# Patient Record
Sex: FEMALE | Race: WHITE | NOT HISPANIC OR LATINO | Employment: FULL TIME | URBAN - METROPOLITAN AREA
[De-identification: names, ages, dates, MRNs, and addresses within clinical notes are randomized per-mention and may not be internally consistent; named-entity substitution may affect disease eponyms.]

---

## 2021-08-18 PROBLEM — I10 HYPERTENSION: Status: ACTIVE | Noted: 2021-08-18

## 2021-09-23 PROBLEM — G90.A POSTURAL ORTHOSTATIC TACHYCARDIA SYNDROME: Status: ACTIVE | Noted: 2021-09-23

## 2021-10-01 ENCOUNTER — TELEPHONE (OUTPATIENT)
Dept: FAMILY MEDICINE CLINIC | Facility: CLINIC | Age: 21
End: 2021-10-01

## 2021-10-01 DIAGNOSIS — Z34.90 PREGNANCY, UNSPECIFIED GESTATIONAL AGE: Primary | ICD-10-CM

## 2021-10-01 NOTE — TELEPHONE ENCOUNTER
Hcg quantitative order placed. Pt is currently taking Metorpolol, Prozac and Fludrocortisone. She has not taken Trazodone, Amitiza or Hydroxyzine. Please advise.

## 2021-10-01 NOTE — TELEPHONE ENCOUNTER
Called pt advised to continue prozac and will discuss at follow up  further. Also advised to contact cardiology for management of metoprolol since was previously prescribed by cardiology and positive pregnancy test.  Pt verbalized understanding.

## 2021-10-01 NOTE — TELEPHONE ENCOUNTER
Pt is ok to continue prozac and we will discuss further at follow up. Metoprolol will need to be changed to lobetalol, I think cardiology prescribes metoprolol and fludrocortisone which is class C. So if pregnancy test is positive pt will need to consult with cardiology for management recommendations.

## 2021-10-01 NOTE — TELEPHONE ENCOUNTER
Caller: Ana Paula Vicente    Relationship to patient: Self    Best call back number: [phone]    Patient is needing: PATIENT CALLED STATING SHE TOOK 2 PREGNANCY TEST AND THEY BOTH CAME BACK POSITIVE. THE PATIENT IS WANTING TO KNOW IF HER PCP CAN ORDER SOME LABS TO GET BLOOD WORK DONE TO KNOW FOR SURE IF SHE IS PREGNANT. THE PATIENT WOULD LIKE A CALL BACK TO LET HER KNOW IF SHE CAN GET THOSE LABS ORDERED. PLEASE ADVISE THANK YOU.         HUB STAFF UNABLE TO WARM TRANSFER

## 2022-04-13 PROBLEM — I95.1 ORTHOSTATIC HYPOTENSION: Status: RESOLVED | Noted: 2021-08-18 | Resolved: 2022-04-13

## 2022-05-28 PROBLEM — O21.9 NAUSEA AND VOMITING IN PREGNANCY: Status: RESOLVED | Noted: 2021-11-11 | Resolved: 2022-05-28

## 2022-05-28 PROBLEM — Z34.90 ENCOUNTER FOR ELECTIVE INDUCTION OF LABOR: Status: ACTIVE | Noted: 2022-05-28

## 2023-07-24 NOTE — PROGRESS NOTES
Chief Complaint    Annual Exam  Annual Exam    Subjective          Ana Paula Amanda is a 22 y.o. female who presents to [facility]    History of Present Illness    Annual Exam    Last dental exam: 1 month ago.   Last eye exam: 1 year ago wears glasses.     Anxiety - pt reports it has been better but she is having some trouble with coping in the last month. Anniversary of her brothers death is upcoming. Pt feels like she lacks motivation. 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 Pt is sleeping well.     POTS - palpitations have been increased. Pt denies increase in caffeine intake. Reports good water intake. Pt has midsternal chest pain intermittently, non radiating. Pt denies n/v. Pt feels like she can't take a good deep breath in.     IBS - pt took linzess and amitiza and had severe diarrhea. She typically will have diarrhea 6 times per day or constipation for 5 days. Pt failed miralax and tried it for 2 weeks. Pt tried fiber without improvement. Last BM was yesterday. Diarrhea worse with anxiety. Magnesium did not help. Pt will have cramping with diarrhea. Pt has bloating.    PHQ-2 Total Score: 13   PHQ-9 Total Score: 13        Review of Systems   Constitutional:  Negative for fatigue and fever.   Respiratory:  Positive for chest tightness and shortness of breath.    Gastrointestinal:  Negative for constipation and diarrhea.   Genitourinary:  Negative for difficulty urinating.   Psychiatric/Behavioral:  Positive for dysphoric mood. The patient is nervous/anxious.         Medical History: has a past medical history of Anxiety, Depression, Lactose intolerance, Nausea and vomiting in pregnancy (11/11/2021), Orthostatic hypotension, POTS (postural orthostatic tachycardia syndrome), and Tachycardia.     Surgical History: has a past surgical history that includes Mandible surgery (2020).     Family History: family history includes Heart disease in her maternal grandfather, maternal  grandmother, mother, and paternal grandmother; Hypertension in her paternal grandmother; Lung cancer in her maternal grandmother; Melanoma in her maternal grandfather; Stroke in her paternal grandfather.     Social History: reports that she has never smoked. She has never been exposed to tobacco smoke. She has never used smokeless tobacco. She reports current alcohol use. She reports that she does not use drugs.    Allergies: Lactose intolerance (gi)      Health Maintenance Due   Topic Date Due    HPV VACCINES (1 - 2-dose series) Never done    ANNUAL PHYSICAL  Never done    COVID-19 Vaccine (4 - Moderna series) 03/03/2022    CHLAMYDIA SCREENING  11/11/2022    PAP SMEAR  07/22/2023            Current Outpatient Medications:     B Complex Vitamins (vitamin b complex) capsule capsule, Take  by mouth Daily., Disp: , Rfl:     Etonogestrel (NEXPLANON) 68 MG implant subdermal implant, Inject 1 each into the appropriate area of the skin as directed by provider 1 (One) Time., Disp: , Rfl:     FLUoxetine (PROzac) 40 MG capsule, Take 1 capsule by mouth Daily., Disp: 90 capsule, Rfl: 1    hydrOXYzine (ATARAX) 25 MG tablet, Take 1 tablet by mouth every night at bedtime. (Patient taking differently: Take 1 tablet by mouth As Needed.), Disp: 90 tablet, Rfl: 1    meclizine (ANTIVERT) 25 MG tablet, Take 1 tablet by mouth 3 (Three) Times a Day As Needed for Dizziness., Disp: 30 tablet, Rfl: 0    metoprolol succinate XL (TOPROL-XL) 25 MG 24 hr tablet, Take 1 tablet by mouth Daily. (Patient taking differently: Take 0.5 tablets by mouth Daily.), Disp: 90 tablet, Rfl: 1    ondansetron (Zofran) 4 MG tablet, Take 1 tablet by mouth Every 8 (Eight) Hours As Needed for Nausea or Vomiting., Disp: 10 tablet, Rfl: 0    Hyoscyamine Sulfate SL (Levsin/SL) 0.125 MG sublingual tablet, Place 1 tablet under the tongue 3 (Three) Times a Day As Needed (diarrhea)., Disp: 120 each, Rfl: 1    LORazepam (Ativan) 0.5 MG tablet, Take 1 tablet by  mouth Every 8 (Eight) Hours As Needed for Anxiety., Disp: 30 tablet, Rfl: 0      Immunization History   Administered Date(s) Administered    COVID-19 (MODERNA) 1st,2nd,3rd Dose Monovalent 12/24/2020, 01/19/2021, 01/06/2022    Hepatitis B Adult/Adolescent IM 07/26/2021    Tdap 07/22/2020, 03/29/2022         Objective       Vitals:    08/17/23 0702   BP: 109/73   Pulse: 88   Temp: 98.7 øF (37.1 øC)   TempSrc: Temporal   SpO2: 100%   Weight: 54.2 kg (119 lb 6.4 oz)      Body mass index is 19.27 kg/mý.   Wt Readings from Last 3 Encounters:   08/17/23 54.2 kg (119 lb 6.4 oz)   07/06/23 54.4 kg (120 lb)   02/19/23 53.5 kg (117 lb 15.1 oz)      BP Readings from Last 3 Encounters:   08/17/23 109/73   07/06/23 101/69   02/19/23 93/60        Physical Exam  Vitals reviewed.   Constitutional:       Appearance: Normal appearance. She is well-developed.   HENT:      Head: Normocephalic and atraumatic.   Eyes:      Conjunctiva/sclera: Conjunctivae normal.      Pupils: Pupils are equal, round, and reactive to light.   Cardiovascular:      Rate and Rhythm: Normal rate and regular rhythm.      Heart sounds: Normal heart sounds. No murmur heard.  Pulmonary:      Effort: Pulmonary effort is normal.      Breath sounds: Normal breath sounds. No wheezing or rhonchi.   Abdominal:      General: Bowel sounds are normal. There is no distension.      Palpations: Abdomen is soft.      Tenderness: There is no abdominal tenderness.   Skin:     General: Skin is warm and dry.   Neurological:      Mental Status: She is alert and oriented to person, place, and time.   Psychiatric:         Mood and Affect: Mood and affect normal.         Behavior: Behavior normal.         Thought Content: Thought content normal.         Judgment: Judgment normal.           Result Review :       Common labs          11/9/2022    14:07 2/19/2023    12:21   Common Labs   Glucose 76  86    BUN 13  17    Creatinine 0.79  0.84    Sodium 140  141    Potassium 4.6  3.6     Chloride 104  105    Calcium 10.0  9.5    Albumin 5.00  4.7    Total Bilirubin 0.4  0.5    Alkaline Phosphatase 87  81    AST (SGOT) 23  17    ALT (SGPT) 14  12    WBC 7.18  6.01    Hemoglobin 13.5  13.2    Hematocrit 40.3  37.6    Platelets 241  209                       Assessment and Plan        Diagnoses and all orders for this visit:    1. Annual physical exam (Primary)  -     CBC (No Diff); Future    2. Screening for lipid disorders  -     Lipid Panel; Future  -     Comprehensive Metabolic Panel; Future    3. Screening for thyroid disorder  -     TSH; Future    4. Anxiety and depression  -     FLUoxetine (PROzac) 40 MG capsule; Take 1 capsule by mouth Daily.  Dispense: 90 capsule; Refill: 1  -     LORazepam (Ativan) 0.5 MG tablet; Take 1 tablet by mouth Every 8 (Eight) Hours As Needed for Anxiety.  Dispense: 30 tablet; Refill: 0    5. Long-term use of high-risk medication  -     POC Urine Drug Screen [facility]-C[facility]    6. Palpitations  -     Adult Transthoracic Echo Complete W/ Cont if Necessary Per Protocol; Future    7. Irritable bowel syndrome with both constipation and diarrhea  Assessment & Plan:  Restart miralax titrate to soft daily stools.     Orders:  -     Hyoscyamine Sulfate SL (Levsin/SL) 0.125 MG sublingual tablet; Place 1 tablet under the tongue 3 (Three) Times a Day As Needed (diarrhea).  Dispense: 120 each; Refill: 1          Follow Up     Return in about 6 months (around 2/17/2024) for Next scheduled follow up.    Obtained a written consent for LIVAN query. Discussed the risks and benefits of the use of controlled substances with the patient, including the risk of tolerance and drug dependence.  The patient has been counseled on the need to have an exit strategy, including potentially discontinuing the use of controlled substances.  LIVAN has or will be reviewed as soon as it becomes available.    Updated annual wellness visit checklist.  Immunizations discussed.  Screening discussed  and/or ordered.  Recommend yearly dental and eye exams. Also discussed monitoring of blood pressure and lipids.      Patient was given instructions and counseling regarding her condition or for health maintenance advice. Please see specific information pulled into the AVS if appropriate.     PARIS Guerin

## 2023-08-03 ENCOUNTER — DOCUMENTATION (OUTPATIENT)
Dept: FAMILY MEDICINE CLINIC | Facility: CLINIC | Age: 23
End: 2023-08-03
Payer: COMMERCIAL

## 2023-08-03 RX ORDER — SULFAMETHOXAZOLE AND TRIMETHOPRIM 800; 160 MG/1; MG/1
1 TABLET ORAL 2 TIMES DAILY
Qty: 20 TABLET | Refills: 0 | Status: SHIPPED | OUTPATIENT
Start: 2023-08-03 | End: 2023-08-13

## 2023-08-04 DIAGNOSIS — L02.91 ABSCESS: Primary | ICD-10-CM

## 2023-08-04 RX ORDER — CLINDAMYCIN PHOSPHATE 10 MG/G
1 GEL TOPICAL 2 TIMES DAILY
Qty: 30 G | Refills: 0 | Status: SHIPPED | OUTPATIENT
Start: 2023-08-04

## 2023-08-17 ENCOUNTER — OFFICE VISIT (OUTPATIENT)
Dept: FAMILY MEDICINE CLINIC | Facility: CLINIC | Age: 23
End: 2023-08-17
Payer: COMMERCIAL

## 2023-08-17 VITALS
SYSTOLIC BLOOD PRESSURE: 109 MMHG | BODY MASS INDEX: 19.27 KG/M2 | WEIGHT: 119.4 LBS | HEART RATE: 88 BPM | DIASTOLIC BLOOD PRESSURE: 73 MMHG | TEMPERATURE: 98.7 F | OXYGEN SATURATION: 100 %

## 2023-08-17 DIAGNOSIS — F32.A ANXIETY AND DEPRESSION: ICD-10-CM

## 2023-08-17 DIAGNOSIS — Z00.00 ANNUAL PHYSICAL EXAM: Primary | ICD-10-CM

## 2023-08-17 DIAGNOSIS — Z79.899 LONG-TERM USE OF HIGH-RISK MEDICATION: ICD-10-CM

## 2023-08-17 DIAGNOSIS — F41.9 ANXIETY AND DEPRESSION: ICD-10-CM

## 2023-08-17 DIAGNOSIS — Z13.220 SCREENING FOR LIPID DISORDERS: ICD-10-CM

## 2023-08-17 DIAGNOSIS — R00.2 PALPITATIONS: ICD-10-CM

## 2023-08-17 DIAGNOSIS — K58.2 IRRITABLE BOWEL SYNDROME WITH BOTH CONSTIPATION AND DIARRHEA: ICD-10-CM

## 2023-08-17 DIAGNOSIS — Z13.29 SCREENING FOR THYROID DISORDER: ICD-10-CM

## 2023-08-17 PROCEDURE — 99395 PREV VISIT EST AGE 18-39: CPT | Performed by: NURSE PRACTITIONER

## 2023-08-17 PROCEDURE — 80305 DRUG TEST PRSMV DIR OPT OBS: CPT | Performed by: NURSE PRACTITIONER

## 2023-08-17 RX ORDER — FLUOXETINE HYDROCHLORIDE 40 MG/1
40 CAPSULE ORAL DAILY
Qty: 90 CAPSULE | Refills: 1 | Status: SHIPPED | OUTPATIENT
Start: 2023-08-17

## 2023-08-17 RX ORDER — HYOSCYAMINE SULFATE 0.12 MG/1
1 TABLET SUBLINGUAL 3 TIMES DAILY PRN
Qty: 120 EACH | Refills: 1 | Status: SHIPPED | OUTPATIENT
Start: 2023-08-17

## 2023-08-17 RX ORDER — LORAZEPAM 0.5 MG/1
0.5 TABLET ORAL EVERY 8 HOURS PRN
Qty: 30 TABLET | Refills: 0 | Status: SHIPPED | OUTPATIENT
Start: 2023-08-17

## 2023-08-24 ENCOUNTER — PROCEDURE VISIT (OUTPATIENT)
Dept: OBSTETRICS AND GYNECOLOGY | Facility: CLINIC | Age: 23
End: 2023-08-24
Payer: COMMERCIAL

## 2023-08-24 VITALS
HEART RATE: 73 BPM | SYSTOLIC BLOOD PRESSURE: 101 MMHG | BODY MASS INDEX: 19.13 KG/M2 | DIASTOLIC BLOOD PRESSURE: 67 MMHG | WEIGHT: 119 LBS | HEIGHT: 66 IN

## 2023-08-24 DIAGNOSIS — Z30.46 ENCOUNTER FOR NEXPLANON REMOVAL: ICD-10-CM

## 2023-08-24 DIAGNOSIS — Z30.09 ENCOUNTER FOR COUNSELING REGARDING CONTRACEPTION: Primary | ICD-10-CM

## 2023-08-24 RX ORDER — ETONOGESTREL AND ETHINYL ESTRADIOL 11.7; 2.7 MG/1; MG/1
1 INSERT, EXTENDED RELEASE VAGINAL
Qty: 1 EACH | Refills: 12 | Status: SHIPPED | OUTPATIENT
Start: 2023-08-24 | End: 2024-08-23

## 2023-08-24 NOTE — PROGRESS NOTES
"Nexplanon Removal      Chief Complaint   Patient presents with    nexplanon removal        This procedure was explained in detail.  She understands removal complications occur rarely and include, but are not limited to, pain, scarring, bleeding, damage to nerves or blood vessels, and infection.  All her questions have been answered.     Subjective:  Ana Paula Amanda is a 22 y.o.  Preen for Nexplanon removal.  Patient with menses changes with Nexplanon in place.  Desires to be placed on NuvaRing.  Had success with NuvaRing in past.      Objective:  /67   Pulse 73   Ht 167.6 cm (66\")   Wt 54 kg (119 lb)   LMP 2023   BMI 19.21 kg/mý     The patient was placed in supine position with the Left arm flexed at the elbow and externally rotated.  The Nexplanon was easily palpable.  The site was cleaned with betadine.  Sterile technique was maintained.  The area was anesthetized with local anesthetic under the darlin.  A ~2 mm incision was made longitudinally at the tip of the implant closest to the elbow.  The darlin was grasped with forceps and gently removed.  The darlin was 4cm long and intact.  A steri-strip and sterile pressure bandage was placed.    Patient tolerated the procedure well.    Assessment and Plan:  Diagnoses and all orders for this visit:    1. Encounter for counseling regarding contraception (Primary)  -     etonogestrel-ethinyl estradiol (NuvaRing) 0.12-0.015 MG/24HR vaginal ring; Insert 1 each into the vagina Every 28 (Twenty-Eight) Days. Insert vaginally and leave in place for 3 consecutive weeks, then remove for 1 week.  Dispense: 1 each; Refill: 12    2. Encounter for Nexplanon removal        Counseling:  She understands that she can become pregnant within days of removal.   PRECAUTIONS--She may remove the bandage in 24 hours and the steri-strip in 3-5 days.  She is to call our office for redness, pain, or drainage from the site.     TRACK MENSES, RTO if <q21d, >7d long, heavy or " painful.    All BIRTH CONTROL options R/B/A/SE/E of each reviewed in detail.        Follow Up:  Return in about 11 months (around 7/15/2024), or if symptoms worsen or fail to improve, for Annual physical.      Zeb Elise MD  08/24/2023    [facility]  [address] [place] [address]  Dept: [phone]  Dept Fax: [phone]  Loc: [phone]  Loc Fax: [phone]

## 2023-09-12 ENCOUNTER — HOSPITAL ENCOUNTER (OUTPATIENT)
Dept: CARDIOLOGY | Facility: HOSPITAL | Age: 23
Discharge: HOME | End: 2023-09-12
Payer: COMMERCIAL

## 2023-09-12 ENCOUNTER — LAB (OUTPATIENT)
Dept: LAB | Facility: HOSPITAL | Age: 23
End: 2023-09-12
Payer: COMMERCIAL

## 2023-09-12 DIAGNOSIS — Z00.00 ANNUAL PHYSICAL EXAM: ICD-10-CM

## 2023-09-12 DIAGNOSIS — Z13.220 SCREENING FOR LIPID DISORDERS: ICD-10-CM

## 2023-09-12 DIAGNOSIS — R00.2 PALPITATIONS: ICD-10-CM

## 2023-09-12 DIAGNOSIS — Z13.29 SCREENING FOR THYROID DISORDER: ICD-10-CM

## 2023-09-12 LAB
ALBUMIN SERPL-MCNC: 5.2 G/DL (ref 3.5–5.2)
ALBUMIN/GLOB SERPL: 2.4 G/DL
ALP SERPL-CCNC: 60 U/L (ref 39–117)
ALT SERPL W P-5'-P-CCNC: 15 U/L (ref 1–33)
ANION GAP SERPL CALCULATED.3IONS-SCNC: 10 MMOL/L (ref 5–15)
AST SERPL-CCNC: 19 U/L (ref 1–32)
BH CV ECHO MEAS - AO MAX PG: 6 MMHG
BH CV ECHO MEAS - AO MEAN PG: 3 MMHG
BH CV ECHO MEAS - AO ROOT DIAM: 2.5 CM
BH CV ECHO MEAS - AO V2 MAX: 117 CM/SEC
BH CV ECHO MEAS - AO V2 VTI: 25.3 CM
BH CV ECHO MEAS - AVA(I,D): 1.49 CM2
BH CV ECHO MEAS - EDV(CUBED): 64 ML
BH CV ECHO MEAS - EDV(MOD-SP2): 71.1 ML
BH CV ECHO MEAS - EDV(MOD-SP4): 70.3 ML
BH CV ECHO MEAS - EF(MOD-BP): 70.1 %
BH CV ECHO MEAS - EF(MOD-SP2): 70.2 %
BH CV ECHO MEAS - EF(MOD-SP4): 72.5 %
BH CV ECHO MEAS - ESV(CUBED): 13.8 ML
BH CV ECHO MEAS - ESV(MOD-SP2): 21.2 ML
BH CV ECHO MEAS - ESV(MOD-SP4): 19.3 ML
BH CV ECHO MEAS - FS: 40 %
BH CV ECHO MEAS - IVS/LVPW: 0.89 CM
BH CV ECHO MEAS - IVSD: 0.8 CM
BH CV ECHO MEAS - LA DIMENSION: 2.4 CM
BH CV ECHO MEAS - LAT PEAK E' VEL: 29.2 CM/SEC
BH CV ECHO MEAS - LV MASS(C)D: 101.4 GRAMS
BH CV ECHO MEAS - LV MAX PG: 3.5 MMHG
BH CV ECHO MEAS - LV MEAN PG: 2 MMHG
BH CV ECHO MEAS - LV V1 MAX: 93.3 CM/SEC
BH CV ECHO MEAS - LV V1 VTI: 18.8 CM
BH CV ECHO MEAS - LVIDD: 4 CM
BH CV ECHO MEAS - LVIDS: 2.4 CM
BH CV ECHO MEAS - LVOT AREA: 2.01 CM2
BH CV ECHO MEAS - LVOT DIAM: 1.6 CM
BH CV ECHO MEAS - LVPWD: 0.9 CM
BH CV ECHO MEAS - MED PEAK E' VEL: 14.5 CM/SEC
BH CV ECHO MEAS - MV A MAX VEL: 51.4 CM/SEC
BH CV ECHO MEAS - MV DEC SLOPE: 625 CM/SEC2
BH CV ECHO MEAS - MV DEC TIME: 0.28 MSEC
BH CV ECHO MEAS - MV E MAX VEL: 89.1 CM/SEC
BH CV ECHO MEAS - MV E/A: 1.73
BH CV ECHO MEAS - MV MAX PG: 5 MMHG
BH CV ECHO MEAS - MV MEAN PG: 1 MMHG
BH CV ECHO MEAS - MV P1/2T: 52 MSEC
BH CV ECHO MEAS - MV V2 VTI: 30.5 CM
BH CV ECHO MEAS - MVA(P1/2T): 4.2 CM2
BH CV ECHO MEAS - MVA(VTI): 1.24 CM2
BH CV ECHO MEAS - RVDD: 2.3 CM
BH CV ECHO MEAS - SV(LVOT): 37.8 ML
BH CV ECHO MEAS - SV(MOD-SP2): 49.9 ML
BH CV ECHO MEAS - SV(MOD-SP4): 51 ML
BH CV ECHO MEASUREMENTS AVERAGE E/E' RATIO: 4.08
BILIRUB SERPL-MCNC: 0.7 MG/DL (ref 0–1.2)
BUN SERPL-MCNC: 11 MG/DL (ref 6–20)
BUN/CREAT SERPL: 12.4 (ref 7–25)
CALCIUM SPEC-SCNC: 9.7 MG/DL (ref 8.6–10.5)
CHLORIDE SERPL-SCNC: 105 MMOL/L (ref 98–107)
CHOLEST SERPL-MCNC: 181 MG/DL (ref 0–200)
CO2 SERPL-SCNC: 26 MMOL/L (ref 22–29)
CREAT SERPL-MCNC: 0.89 MG/DL (ref 0.57–1)
DEPRECATED RDW RBC AUTO: 39 FL (ref 37–54)
EGFRCR SERPLBLD CKD-EPI 2021: 93 ML/MIN/1.73
ERYTHROCYTE [DISTWIDTH] IN BLOOD BY AUTOMATED COUNT: 11.8 % (ref 12.3–15.4)
GLOBULIN UR ELPH-MCNC: 2.2 GM/DL
GLUCOSE SERPL-MCNC: 80 MG/DL (ref 65–99)
HCT VFR BLD AUTO: 39.4 % (ref 34–46.6)
HDLC SERPL-MCNC: 61 MG/DL (ref 40–60)
HGB BLD-MCNC: 13.6 G/DL (ref 12–15.9)
IVRT: 55 MSEC
LDLC SERPL CALC-MCNC: 106 MG/DL (ref 0–100)
LDLC/HDLC SERPL: 1.72 {RATIO}
MCH RBC QN AUTO: 31.3 PG (ref 26.6–33)
MCHC RBC AUTO-ENTMCNC: 34.5 G/DL (ref 31.5–35.7)
MCV RBC AUTO: 90.8 FL (ref 79–97)
PLATELET # BLD AUTO: 224 10*3/MM3 (ref 140–450)
PMV BLD AUTO: 13.1 FL (ref 6–12)
POTASSIUM SERPL-SCNC: 4.3 MMOL/L (ref 3.5–5.2)
PROT SERPL-MCNC: 7.4 G/DL (ref 6–8.5)
RBC # BLD AUTO: 4.34 10*6/MM3 (ref 3.77–5.28)
SODIUM SERPL-SCNC: 141 MMOL/L (ref 136–145)
TRIGL SERPL-MCNC: 76 MG/DL (ref 0–150)
TSH SERPL DL<=0.05 MIU/L-ACNC: 0.84 UIU/ML (ref 0.27–4.2)
VLDLC SERPL-MCNC: 14 MG/DL (ref 5–40)
WBC NRBC COR # BLD: 6.2 10*3/MM3 (ref 3.4–10.8)

## 2023-09-12 PROCEDURE — 80050 GENERAL HEALTH PANEL: CPT

## 2023-09-12 PROCEDURE — 36415 COLL VENOUS BLD VENIPUNCTURE: CPT

## 2023-09-12 PROCEDURE — 93306 TTE W/DOPPLER COMPLETE: CPT

## 2023-09-12 PROCEDURE — 80061 LIPID PANEL: CPT

## 2023-09-13 DIAGNOSIS — G90.A POTS (POSTURAL ORTHOSTATIC TACHYCARDIA SYNDROME): Primary | ICD-10-CM

## 2023-09-28 ENCOUNTER — TELEPHONE (OUTPATIENT)
Dept: OBSTETRICS AND GYNECOLOGY | Facility: CLINIC | Age: 23
End: 2023-09-28
Payer: COMMERCIAL

## 2023-09-28 DIAGNOSIS — O36.80X0 PREGNANCY WITH INCONCLUSIVE FETAL VIABILITY, SINGLE OR UNSPECIFIED FETUS: Primary | ICD-10-CM

## 2023-09-28 RX ORDER — LABETALOL 100 MG/1
50 TABLET, FILM COATED ORAL 2 TIMES DAILY
Qty: 30 TABLET | Refills: 2 | Status: SHIPPED | OUTPATIENT
Start: 2023-09-28

## 2023-09-28 NOTE — TELEPHONE ENCOUNTER
Patient called in with +UPT and states her LMP was 09/01. She is having no current problems/concerns and is scheduled for her first initial appointment with you 11/21. Please sign attached order per protocol for patient to have viability/dating ultrasound around 8weeks GA.

## 2023-10-16 ENCOUNTER — PATIENT MESSAGE (OUTPATIENT)
Dept: OBSTETRICS AND GYNECOLOGY | Facility: CLINIC | Age: 23
End: 2023-10-16
Payer: COMMERCIAL

## 2023-10-16 DIAGNOSIS — R11.2 NAUSEA AND VOMITING, UNSPECIFIED VOMITING TYPE: Primary | ICD-10-CM

## 2023-10-16 RX ORDER — DIPHENHYDRAMINE HYDROCHLORIDE 25 MG/1
25 CAPSULE ORAL EVERY 8 HOURS
Qty: 90 TABLET | Refills: 4 | Status: SHIPPED | OUTPATIENT
Start: 2023-10-16

## 2023-10-16 NOTE — TELEPHONE ENCOUNTER
From: Ana Paula Amanda  To: Zeb Elise  Sent: 10/16/2023 7:35 AM EDT  Subject: N/V     Good morning,   I am 6 weeks pregnant now with nausea and vomiting that is inhibiting me from doing daily tasks. I have tried unison with no luck. I am in school in [place] with clinical days M-F and this is making it very difficult to even make the drive up there. I am nauseous all day and nothing relieves that feeling. I was unsure if anyone had any suggestions to help.

## 2023-10-19 ENCOUNTER — CLINICAL SUPPORT (OUTPATIENT)
Dept: FAMILY MEDICINE CLINIC | Facility: CLINIC | Age: 23
End: 2023-10-19
Payer: COMMERCIAL

## 2023-10-19 DIAGNOSIS — Z23 NEED FOR INFLUENZA VACCINATION: Primary | ICD-10-CM

## 2023-10-19 PROCEDURE — 90686 IIV4 VACC NO PRSV 0.5 ML IM: CPT | Performed by: NURSE PRACTITIONER

## 2023-10-19 PROCEDURE — 90471 IMMUNIZATION ADMIN: CPT | Performed by: NURSE PRACTITIONER

## 2023-10-23 ENCOUNTER — OFFICE VISIT (OUTPATIENT)
Dept: CARDIOLOGY | Facility: CLINIC | Age: 23
End: 2023-10-23
Payer: COMMERCIAL

## 2023-10-23 VITALS
HEART RATE: 111 BPM | BODY MASS INDEX: 20.09 KG/M2 | DIASTOLIC BLOOD PRESSURE: 76 MMHG | SYSTOLIC BLOOD PRESSURE: 130 MMHG | HEIGHT: 66 IN | WEIGHT: 125 LBS

## 2023-10-23 DIAGNOSIS — R07.9 CHEST PAIN, UNSPECIFIED TYPE: ICD-10-CM

## 2023-10-23 DIAGNOSIS — R00.2 PALPITATIONS: ICD-10-CM

## 2023-10-23 DIAGNOSIS — G90.A POTS (POSTURAL ORTHOSTATIC TACHYCARDIA SYNDROME): Primary | ICD-10-CM

## 2023-10-23 PROCEDURE — 99204 OFFICE O/P NEW MOD 45 MIN: CPT | Performed by: INTERNAL MEDICINE

## 2023-10-23 NOTE — PROGRESS NOTES
Chief Complaint  Establish Care and POTS    Subjective        Ana Paula Amanda presents to [facility]  History of present illness:    Patient is a 22-year-old female with a past medical history significant for POTS syndrome.  This was diagnosed by a tilt table back when the patient was 14 years old.  She was on metoprolol and Florinef in the past but then during the pregnancy they stopped everything.  She is currently again 7 weeks pregnant.  She presents with palpitations and chest pain.  Palpitations occur 3-4 nights per week.  They can occur at rest.  They can last for 10 to 15 minutes at a time.  Her chest pain is very random and occur sitting or lying down.  It is in the left chest and she feels it could be consistent with her anxiety.  She states before she got pregnant at this time she was on metoprolol but had it switched to labetalol when she found out she was pregnant.  She states she has had very significant nausea and vomiting and has not been able to really take any pills at all recently.  When she was taking the labetalol even once a day she was noticing significant dizziness. she denies any tobacco use.  No significant alcohol.  Mother has a history of mitral valve prolapse.      Past Medical History:   Diagnosis Date    Anxiety     Depression     Lactose intolerance     Nausea and vomiting in pregnancy 11/11/2021    Zofran ODT 4 mg as needed Phenergan 12.5 mg rectal suppository as needed    Orthostatic hypotension     POTS (postural orthostatic tachycardia syndrome)     Tachycardia          Past Surgical History:   Procedure Laterality Date    MANDIBLE SURGERY  2020    Reconstructive Jaw Surgery          Social History     Socioeconomic History    Marital status:    Tobacco Use    Smoking status: Never     Passive exposure: Never    Smokeless tobacco: Never   Vaping Use    Vaping Use: Never used   Substance and Sexual Activity    Alcohol use: Yes     Comment:  "socially    Drug use: Never    Sexual activity: Yes     Partners: Male     Birth control/protection: None         Family History   Problem Relation Age of Onset    Heart disease Mother     Stroke Paternal Grandfather     Heart disease Paternal Grandmother     Hypertension Paternal Grandmother     Heart disease Maternal Grandmother     Lung cancer Maternal Grandmother     Heart disease Maternal Grandfather     Melanoma Maternal Grandfather     Breast cancer Neg Hx     Ovarian cancer Neg Hx     Uterine cancer Neg Hx     Colon cancer Neg Hx     Deep vein thrombosis Neg Hx     Pulmonary embolism Neg Hx           Allergies   Allergen Reactions    Lactose Intolerance (Gi) GI Intolerance            Current Outpatient Medications:     doxylamine (UNISOM) 25 MG tablet, Take 1 tablet by mouth At Night As Needed for Nausea (And one half every 8 hours for additional nausea)., Disp: 45 tablet, Rfl: 4    vitamin B-6 (PYRIDOXINE) 25 MG tablet, Take 1 tablet by mouth Every 8 (Eight) Hours., Disp: 90 tablet, Rfl: 4      ROS:  Cardiac review of systems positive for atypical chest pain and palpitations.    Objective     /76   Pulse 111   Ht 167.6 cm (66\")   Wt 56.7 kg (125 lb)   BMI 20.18 kg/m²       General Appearance:   well developed  well nourished  HENT:   oropharynx moist  lips not cyanotic  Respiratory:  no respiratory distress  normal breath sounds  no rales  Cardiovascular:  no jugular venous distention  regular rhythm  S1 normal, S2 normal  no S3, no S4   no murmur  no rub, no thrill  No carotid bruit  pedal pulses normal  lower extremity edema: none    Musculoskeletal:  no clubbing of fingers.   normocephalic, head atraumatic  Skin:   warm, dry  Psychiatric:  judgement and insight appropriate  normal mood and affect    ECHO:  Results for orders placed during the hospital encounter of 09/12/23    Adult Transthoracic Echo Complete W/ Cont if Necessary Per Protocol    Interpretation Summary    Left ventricular " systolic function is hyperdynamic (EF > 70%). Calculated left ventricular EF = 70.1%    Left ventricular diastolic function was normal.    STRESS:    CATH:  No results found for this or any previous visit.    BMP:     Glucose   Date Value Ref Range Status   09/12/2023 80 65 - 99 mg/dL Final     BUN   Date Value Ref Range Status   09/12/2023 11 6 - 20 mg/dL Final     Creatinine   Date Value Ref Range Status   09/12/2023 0.89 0.57 - 1.00 mg/dL Final     Sodium   Date Value Ref Range Status   09/12/2023 141 136 - 145 mmol/L Final     Potassium   Date Value Ref Range Status   09/12/2023 4.3 3.5 - 5.2 mmol/L Final     Chloride   Date Value Ref Range Status   09/12/2023 105 98 - 107 mmol/L Final     CO2   Date Value Ref Range Status   09/12/2023 26.0 22.0 - 29.0 mmol/L Final     Calcium   Date Value Ref Range Status   09/12/2023 9.7 8.6 - 10.5 mg/dL Final     BUN/Creatinine Ratio   Date Value Ref Range Status   09/12/2023 12.4 7.0 - 25.0 Final     Anion Gap   Date Value Ref Range Status   09/12/2023 10.0 5.0 - 15.0 mmol/L Final     eGFR   Date Value Ref Range Status   09/12/2023 93.0 >60.0 mL/min/1.73 Final     LIPIDS:  Total Cholesterol   Date Value Ref Range Status   09/12/2023 181 0 - 200 mg/dL Final     Triglycerides   Date Value Ref Range Status   09/12/2023 76 0 - 150 mg/dL Final     HDL Cholesterol   Date Value Ref Range Status   09/12/2023 61 (H) 40 - 60 mg/dL Final     LDL Cholesterol    Date Value Ref Range Status   09/12/2023 106 (H) 0 - 100 mg/dL Final     VLDL Cholesterol   Date Value Ref Range Status   09/12/2023 14 5 - 40 mg/dL Final     LDL/HDL Ratio   Date Value Ref Range Status   09/12/2023 1.72  Final         Procedures             ASSESSMENT:  Diagnoses and all orders for this visit:    1. POTS (postural orthostatic tachycardia syndrome) (Primary)    2. Chest pain, unspecified type    3. Palpitations  -     Cardiac Event Monitor; Future         PLAN:    1.  Patient's chest pain is very atypical and I  do not think it represents a cardiac source.  2.  For patient's palpitations we will place a 1 week event recorder to find out exactly what she is feeling.  3.  Encouraged the patient to drink lots of water and limit her caffeine.  Also talked certainly about liberal salt.  She did have an echocardiogram 9/12/2023 that showed hyperdynamic LV function with no significant valvular disease.  4.  Told patient I certainly do not think blood pressure is going to tolerate the labetalol.  5.  We will continue to follow this patient closely during her current pregnancy.  6.  Patient had cholesterol checked 9/12/2013 with , HDL 61, and triglycerides 76.  These are under good control.      Return in about 2 months (around 12/23/2023) for yasmin patterson.     Patient was given instructions and counseling regarding her condition or for health maintenance advice. Please see specific information pulled into the AVS if appropriate.         Breezy Grullon MD   10/23/2023  19:17 EDT

## 2023-11-21 ENCOUNTER — INITIAL PRENATAL (OUTPATIENT)
Dept: OBSTETRICS AND GYNECOLOGY | Facility: CLINIC | Age: 23
End: 2023-11-21
Payer: COMMERCIAL

## 2023-11-21 VITALS — WEIGHT: 130 LBS | SYSTOLIC BLOOD PRESSURE: 140 MMHG | DIASTOLIC BLOOD PRESSURE: 76 MMHG | BODY MASS INDEX: 20.98 KG/M2

## 2023-11-21 DIAGNOSIS — O10.919 CHRONIC HYPERTENSION AFFECTING PREGNANCY: ICD-10-CM

## 2023-11-21 DIAGNOSIS — F32.A ANXIETY AND DEPRESSION: ICD-10-CM

## 2023-11-21 DIAGNOSIS — Z34.80 SUPERVISION OF OTHER NORMAL PREGNANCY, ANTEPARTUM: Primary | ICD-10-CM

## 2023-11-21 DIAGNOSIS — F41.9 ANXIETY AND DEPRESSION: ICD-10-CM

## 2023-11-21 DIAGNOSIS — O21.9 NAUSEA AND VOMITING IN PREGNANCY: ICD-10-CM

## 2023-11-21 LAB
ABO GROUP BLD: NORMAL
ALBUMIN SERPL-MCNC: 4.7 G/DL (ref 3.5–5.2)
ALBUMIN/GLOB SERPL: 1.7 G/DL
ALP SERPL-CCNC: 59 U/L (ref 39–117)
ALT SERPL W P-5'-P-CCNC: 13 U/L (ref 1–33)
AMPHET+METHAMPHET UR QL: NEGATIVE
ANION GAP SERPL CALCULATED.3IONS-SCNC: 14 MMOL/L (ref 5–15)
AST SERPL-CCNC: 20 U/L (ref 1–32)
BARBITURATES UR QL SCN: NEGATIVE
BASOPHILS # BLD AUTO: 0.04 10*3/MM3 (ref 0–0.2)
BASOPHILS NFR BLD AUTO: 0.5 % (ref 0–1.5)
BENZODIAZ UR QL SCN: NEGATIVE
BILIRUB SERPL-MCNC: 0.3 MG/DL (ref 0–1.2)
BLD GP AB SCN SERPL QL: NEGATIVE
BUN SERPL-MCNC: 10 MG/DL (ref 6–20)
BUN/CREAT SERPL: 16.7 (ref 7–25)
C TRACH RRNA CVX QL NAA+PROBE: NOT DETECTED
CALCIUM SPEC-SCNC: 9.6 MG/DL (ref 8.6–10.5)
CANNABINOIDS SERPL QL: NEGATIVE
CHLORIDE SERPL-SCNC: 99 MMOL/L (ref 98–107)
CO2 SERPL-SCNC: 21 MMOL/L (ref 22–29)
COCAINE UR QL: NEGATIVE
CREAT SERPL-MCNC: 0.6 MG/DL (ref 0.57–1)
DEPRECATED RDW RBC AUTO: 40 FL (ref 37–54)
EGFRCR SERPLBLD CKD-EPI 2021: 128.7 ML/MIN/1.73
EOSINOPHIL # BLD AUTO: 0.07 10*3/MM3 (ref 0–0.4)
EOSINOPHIL NFR BLD AUTO: 0.8 % (ref 0.3–6.2)
ERYTHROCYTE [DISTWIDTH] IN BLOOD BY AUTOMATED COUNT: 11.8 % (ref 12.3–15.4)
FENTANYL UR-MCNC: NEGATIVE NG/ML
GLOBULIN UR ELPH-MCNC: 2.7 GM/DL
GLUCOSE SERPL-MCNC: 72 MG/DL (ref 65–99)
GLUCOSE UR STRIP-MCNC: NEGATIVE MG/DL
HBV SURFACE AG SERPL QL IA: NORMAL
HCT VFR BLD AUTO: 42.4 % (ref 34–46.6)
HCV AB SER DONR QL: NORMAL
HGB BLD-MCNC: 14.4 G/DL (ref 12–15.9)
HIV1+2 AB SER QL: NORMAL
IMM GRANULOCYTES # BLD AUTO: 0.03 10*3/MM3 (ref 0–0.05)
IMM GRANULOCYTES NFR BLD AUTO: 0.4 % (ref 0–0.5)
LYMPHOCYTES # BLD AUTO: 2.11 10*3/MM3 (ref 0.7–3.1)
LYMPHOCYTES NFR BLD AUTO: 24.9 % (ref 19.6–45.3)
MCH RBC QN AUTO: 31.4 PG (ref 26.6–33)
MCHC RBC AUTO-ENTMCNC: 34 G/DL (ref 31.5–35.7)
MCV RBC AUTO: 92.4 FL (ref 79–97)
METHADONE UR QL SCN: NEGATIVE
MONOCYTES # BLD AUTO: 0.57 10*3/MM3 (ref 0.1–0.9)
MONOCYTES NFR BLD AUTO: 6.7 % (ref 5–12)
N GONORRHOEA RRNA SPEC QL NAA+PROBE: NOT DETECTED
NEUTROPHILS NFR BLD AUTO: 5.66 10*3/MM3 (ref 1.7–7)
NEUTROPHILS NFR BLD AUTO: 66.7 % (ref 42.7–76)
NRBC BLD AUTO-RTO: 0 /100 WBC (ref 0–0.2)
OPIATES UR QL: NEGATIVE
OXYCODONE UR QL SCN: NEGATIVE
PLATELET # BLD AUTO: 212 10*3/MM3 (ref 140–450)
PMV BLD AUTO: 12.9 FL (ref 6–12)
POTASSIUM SERPL-SCNC: 3.7 MMOL/L (ref 3.5–5.2)
PROT SERPL-MCNC: 7.4 G/DL (ref 6–8.5)
PROT UR STRIP-MCNC: NEGATIVE MG/DL
RBC # BLD AUTO: 4.59 10*6/MM3 (ref 3.77–5.28)
RH BLD: POSITIVE
SODIUM SERPL-SCNC: 134 MMOL/L (ref 136–145)
T PALLIDUM IGG SER QL: NORMAL
WBC NRBC COR # BLD AUTO: 8.48 10*3/MM3 (ref 3.4–10.8)

## 2023-11-21 PROCEDURE — 80307 DRUG TEST PRSMV CHEM ANLYZR: CPT | Performed by: STUDENT IN AN ORGANIZED HEALTH CARE EDUCATION/TRAINING PROGRAM

## 2023-11-21 PROCEDURE — 86900 BLOOD TYPING SEROLOGIC ABO: CPT | Performed by: STUDENT IN AN ORGANIZED HEALTH CARE EDUCATION/TRAINING PROGRAM

## 2023-11-21 PROCEDURE — 87086 URINE CULTURE/COLONY COUNT: CPT | Performed by: STUDENT IN AN ORGANIZED HEALTH CARE EDUCATION/TRAINING PROGRAM

## 2023-11-21 PROCEDURE — 86780 TREPONEMA PALLIDUM: CPT | Performed by: STUDENT IN AN ORGANIZED HEALTH CARE EDUCATION/TRAINING PROGRAM

## 2023-11-21 PROCEDURE — 87591 N.GONORRHOEAE DNA AMP PROB: CPT | Performed by: STUDENT IN AN ORGANIZED HEALTH CARE EDUCATION/TRAINING PROGRAM

## 2023-11-21 PROCEDURE — G0432 EIA HIV-1/HIV-2 SCREEN: HCPCS | Performed by: STUDENT IN AN ORGANIZED HEALTH CARE EDUCATION/TRAINING PROGRAM

## 2023-11-21 PROCEDURE — 86901 BLOOD TYPING SEROLOGIC RH(D): CPT | Performed by: STUDENT IN AN ORGANIZED HEALTH CARE EDUCATION/TRAINING PROGRAM

## 2023-11-21 PROCEDURE — 86850 RBC ANTIBODY SCREEN: CPT | Performed by: STUDENT IN AN ORGANIZED HEALTH CARE EDUCATION/TRAINING PROGRAM

## 2023-11-21 PROCEDURE — 87491 CHLMYD TRACH DNA AMP PROBE: CPT | Performed by: STUDENT IN AN ORGANIZED HEALTH CARE EDUCATION/TRAINING PROGRAM

## 2023-11-21 PROCEDURE — 86803 HEPATITIS C AB TEST: CPT | Performed by: STUDENT IN AN ORGANIZED HEALTH CARE EDUCATION/TRAINING PROGRAM

## 2023-11-21 PROCEDURE — 87340 HEPATITIS B SURFACE AG IA: CPT | Performed by: STUDENT IN AN ORGANIZED HEALTH CARE EDUCATION/TRAINING PROGRAM

## 2023-11-21 PROCEDURE — 80053 COMPREHEN METABOLIC PANEL: CPT | Performed by: STUDENT IN AN ORGANIZED HEALTH CARE EDUCATION/TRAINING PROGRAM

## 2023-11-21 PROCEDURE — 85025 COMPLETE CBC W/AUTO DIFF WBC: CPT | Performed by: STUDENT IN AN ORGANIZED HEALTH CARE EDUCATION/TRAINING PROGRAM

## 2023-11-21 RX ORDER — PNV NO.95/FERROUS FUM/FOLIC AC 28MG-0.8MG
1 TABLET ORAL DAILY
Qty: 30 TABLET | Refills: 11 | Status: SHIPPED | OUTPATIENT
Start: 2023-11-21

## 2023-11-21 NOTE — PROGRESS NOTES
OB Initial Visit    CC- Here for care of current pregnancy     CAROL Finalized : Due date finalized: L = first trimester scan     Subjective:  22 y.o.  presenting for her first obstetrical visit.    LMP: Patient's last menstrual period was 2023. unsure, irregular    Pt complains of  nausea    Current feelings about pregnancy: she is excited about pregnancy.    Current medications:  taking phenergan to help with nausea and vomiting.   Hospitalization/ED since pregnant: no  Seen previous provider:  no  Previous pregnancy hx: G1; no complications during pregnancy.   Rx'd meds: Was taking metoprolol for POTS previously.  Does have a cardiologist and follow up in December. She has been on Prozac and Ativan in the past. Will have mood swings. Does not have thoughts of hurting self or others. She has taken Trazadone and Atarax in the past. She stopped two weeks after she found out she was pregnant. She does have a therapist and would like to contact them to start CBT.   Abdominal surgeries: none  Tobacco, alcohol, illlicit drugs: no, none, none  Hx of STIs including Herpes:  none   Hx of abnormal PAP smear: NO history of abnormal PAP smear; normal in 2021   Personal Family Hx of Br, uterine, ovarian or colon cancer: none personally, none in family that she is aware of  Congential: none for her ; Amadou - none that he is aware.        Reviewed and updated:  OBHx, GYNHx (STDs), PMHx, Medications, Allergies, PSHx, Social Hx, Preventative Hx (PAP), Hx of abuse/safe environment, Vaccine Hx including hx of chickenpox or vaccine, Genetic Hx (pt, FOB, both families).        Objective:  /76   Wt 59 kg (130 lb)   LMP 2023   BMI 20.98 kg/m²   General- NAD, alert and oriented, appropriate  Psych- Normal mood, good memory  Neck- No masses, no thyroid enlargement  CV- Regular rhythm, no murnurs  Resp- CTA to bases, no wheezes  Abdomen- Soft, non distended, non tender, no masses     Breast left- No mass, non  tender, no nipple discharge  Breast right- No mass, non tender, no nipple discharge    External genitalia- Normal, no lesions  Urethra- Normal, no masses, non tender  Vagina- Normal, no discharge  Bladder- Normal, no masses, non tender  Cvx- Normal, no lesions, no discharge, no CMT  Uterus- Normal shape and consistency, non tender, Size less than dates, Bedside US consistent with dates.  -160.  Adnexa- Normal, no mass, non tender    Lymphatic- No palpable neck, axillary, or groin nodes  Ext- No edema, no cyanosis    Skin- No lesions, no rashes, no acanthosis nigricans      Assessment and Plan:  Diagnoses and all orders for this visit:    1. Supervision of other normal pregnancy, antepartum (Primary)  Overview:  Initial visit:  PNL: 11/21/2023   PAP/GC/CT/Urine culture: NILM 11/2021; 11/21/2023   Blood type: O+  Hx of HSV?:  none    Genetic testing:    NIPS, 11/21/2023 ; CF previously performed, negative     Vaccinations:  COVID:  11/21/2023 s/p vaccination  Influenza: 11/21/2023 s/p vaccination   RSV    24-28 weeks:  1hr GCT:  Hct/Plt:  Tdap Rx      Third Trimester:  GBS  Breast pump:    Ultrasound:  Dating:  L = 7w5d   Anatomy:   Follow up:     PLAN:        Orders:  -     POC Urinalysis Dipstick  -     Urine Culture - Urine, Urine, Clean Catch  -     OB Panel With HIV; Future  -     Chlamydia trachomatis, Neisseria gonorrhoeae, PCR - Swab, Cervix  -     Urine Drug Screen - Urine, Clean Catch; Future  -     [id number] PLUS Core+SCA+ESS - Blood,  -     OB Panel With HIV    2. Nausea and vomiting in pregnancy  Overview:  11/21/2023 improving with phenergan.     Orders:  -     Prenatal Vit-Fe Fumarate-FA (Prenatal Vitamin) 27-0.8 MG tablet; Take 1 tablet by mouth Daily.  Dispense: 30 tablet; Refill: 11    3. Chronic hypertension affecting pregnancy  Overview:  11/21/2023 reports being seen by cardiologist. Previously on metoprolol and and labetalol. No meds currently. Baseline labs. Start baby ASA at 16 weeks  GA.     Orders:  -     Comprehensive Metabolic Panel; Future  -     Protein / Creatinine Ratio, Urine - Urine, Clean Catch  -     Comprehensive Metabolic Panel    4. Anxiety and depression  Overview:  Hx of Prozac 20 mg daily  11/21/2023 EPDS: 14 at NOB. No SI/HI; has counselor; wants to start with CBT over medications.       Other orders  -     Hepatitis C Antibody            11w2d    Genetic Screening: NIPS    Vaccines: s/p FLU vaccine this season  s/p COVID vaccine    Counseling: Nutrition discussed, calories, activity/exercise in pregnancy  Discussed dietary restrictions/safety food preparation in pregnancy  Reviewed what to expect prenatal visits, office providers (female and male) and covering [facility] Hospitalists/Dr. Hernandez  Appropriate trimester precautions provided, N/V, vag bleeding, cramping  VACCINE importance in pregnancy discussed.  Maternal and fetal risk of not being vaccinated reviewed NLT increased risk maternal/fetal severity of illness/death, PTD, CS, hemorrhage, HTN, possible IUFD.  Significant maternal and fetal/infant benefit w vaccination.  FDA approval and ACOG/SMFM/CDC strong recommendation in pregnancy.  Questions answered.   Questions answered  ANXIETY/DEPRESSION- We discussed treatment options R/B/A/SE/E expectant, THERAPY, SSRI, vs SSRI + Therapy.  Non medical options usually recommended first.  OVI reviewed.  Ideally weaning in third tri if possible. Some studies indicate child w increased risk Dep/Anx w SSRI use in preg.  Black box warning.  Recommend avoid abrupt discontinuation.    Labs: Prenatal labs, cultures, and PAP performed (prn), CMP, Upc ratio    Return in about 4 weeks (around 12/19/2023) for Next scheduled follow up.      Zeb Elise MD  11/21/2023

## 2023-11-21 NOTE — PATIENT INSTRUCTIONS
Venipuncture Blood Specimen Collection  Venipuncture performed in right arm by Brianna Schreiber with good hemostasis. Patient tolerated the procedure well without complications.   11/21/23   Brianna Schreiber

## 2023-11-22 LAB
BACTERIA SPEC AEROBE CULT: NO GROWTH
RUBV IGG SERPL IA-ACNC: 16.5 INDEX

## 2023-11-27 ENCOUNTER — PATIENT ROUNDING (BHMG ONLY) (OUTPATIENT)
Dept: OBSTETRICS AND GYNECOLOGY | Facility: CLINIC | Age: 23
End: 2023-11-27
Payer: COMMERCIAL

## 2023-11-27 NOTE — PROGRESS NOTES
A My-Chart message has been sent to the patient for PATIENT [place] with [facility]

## 2023-11-30 ENCOUNTER — TELEPHONE (OUTPATIENT)
Dept: OBSTETRICS AND GYNECOLOGY | Facility: CLINIC | Age: 23
End: 2023-11-30
Payer: COMMERCIAL

## 2023-11-30 NOTE — TELEPHONE ENCOUNTER
----- Message from Zeb Elise MD sent at 11/30/2023  8:45 AM EST -----  Low risk pregnancy.  Karyotype male with patient desires to know.

## 2023-12-20 ENCOUNTER — DOCUMENTATION (OUTPATIENT)
Dept: FAMILY MEDICINE CLINIC | Facility: CLINIC | Age: 23
End: 2023-12-20
Payer: COMMERCIAL

## 2023-12-20 DIAGNOSIS — F41.9 ANXIETY AND DEPRESSION: Primary | ICD-10-CM

## 2023-12-20 DIAGNOSIS — F32.A ANXIETY AND DEPRESSION: Primary | ICD-10-CM

## 2023-12-21 ENCOUNTER — ROUTINE PRENATAL (OUTPATIENT)
Dept: OBSTETRICS AND GYNECOLOGY | Facility: CLINIC | Age: 23
End: 2023-12-21
Payer: COMMERCIAL

## 2023-12-21 ENCOUNTER — TELEPHONE (OUTPATIENT)
Dept: OBSTETRICS AND GYNECOLOGY | Facility: CLINIC | Age: 23
End: 2023-12-21
Payer: COMMERCIAL

## 2023-12-21 VITALS — DIASTOLIC BLOOD PRESSURE: 77 MMHG | BODY MASS INDEX: 21.95 KG/M2 | SYSTOLIC BLOOD PRESSURE: 114 MMHG | WEIGHT: 136 LBS

## 2023-12-21 DIAGNOSIS — G90.A POTS (POSTURAL ORTHOSTATIC TACHYCARDIA SYNDROME): ICD-10-CM

## 2023-12-21 DIAGNOSIS — O10.919 CHRONIC HYPERTENSION AFFECTING PREGNANCY: ICD-10-CM

## 2023-12-21 DIAGNOSIS — Z34.80 SUPERVISION OF OTHER NORMAL PREGNANCY, ANTEPARTUM: Primary | ICD-10-CM

## 2023-12-21 LAB
CREAT UR-MCNC: 231 MG/DL
GLUCOSE UR STRIP-MCNC: NEGATIVE MG/DL
PROT ?TM UR-MCNC: 15.6 MG/DL
PROT UR STRIP-MCNC: NEGATIVE MG/DL
PROT/CREAT UR: 0.07 MG/G{CREAT}

## 2023-12-21 PROCEDURE — 84156 ASSAY OF PROTEIN URINE: CPT | Performed by: STUDENT IN AN ORGANIZED HEALTH CARE EDUCATION/TRAINING PROGRAM

## 2023-12-21 PROCEDURE — 82570 ASSAY OF URINE CREATININE: CPT | Performed by: STUDENT IN AN ORGANIZED HEALTH CARE EDUCATION/TRAINING PROGRAM

## 2023-12-21 RX ORDER — ASPIRIN 81 MG/1
81 TABLET ORAL NIGHTLY
Qty: 30 TABLET | Refills: 11 | Status: SHIPPED | OUTPATIENT
Start: 2023-12-21

## 2023-12-21 NOTE — PROGRESS NOTES
OB FOLLOW UP  Complaint   Chief Complaint   Patient presents with    Routine Prenatal Visit            Ana Paula Amanda is a 22 y.o.  15w6d patient being seen today for her obstetrical follow up visit. Patient denies  fetal movement, contractions, loss of fluid or vaginal bleeding.  Has been monitoring blood pressures at home.  Having couple episodes of lightheaded and dizziness.  Denies any loss of consciousness.  Did have a Holter monitor approximately 2 months ago.  Nausea and vomiting improving only needing to use half a tablet of Phenergan daily.  No other acute complaints.    Her prenatal care is complicated by (and status) :    Patient Active Problem List   Diagnosis    Tachycardia    POTS (postural orthostatic tachycardia syndrome)    Irritable bowel syndrome with both constipation and diarrhea    Anxiety and depression    Chronic hypertension affecting pregnancy    Nausea and vomiting in pregnancy    Supervision of other normal pregnancy, antepartum       All other systems reviewed and are negative.     The additional following portions of the patient's history were reviewed and updated as appropriate: allergies, current medications, past family history, past medical history, past social history, past surgical history, and problem list.      EXAM:     Vital signs: /77   Wt 61.7 kg (136 lb)   LMP 2023   BMI 21.95 kg/m²   Appearance/psychiatric: To be in no distress  Constitutional: The patient is well nourished.  Cardiovascular: She does not have edema.  Respiratory: Respiratory effort is normal.  Gastrointestinal: Abdomen is soft, gravid, nontender, no rashes, heart tones are present, fundal height is size equals dates    Pelvic Exam: No    Urine glucose/protein: See prenatal flowsheet       Assessment and Plan    Problem List Items Addressed This Visit          Gravid and     Supervision of other normal pregnancy, antepartum - Primary    Overview     Initial visit:  PNL:  11/21/2023   PAP/GC/CT/Urine culture: NILM 11/2021; 11/21/2023   Blood type: O+  Hx of HSV?:  none    Genetic testing:    NIPS, 11/21/2023 - low risk XY ; CF previously performed, negative     Vaccinations:  COVID:  11/21/2023 s/p vaccination  Influenza: 11/21/2023 s/p vaccination   RSV    24-28 weeks:  1hr GCT:  Hct/Plt:  Tdap Rx      Third Trimester:  GBS  Breast pump:    Ultrasound:  Dating:  L = 7w5d   Anatomy:   Follow up:     PLAN:             Relevant Orders    POC Urinalysis Dipstick (Completed)    US Ob Detail Fetal Anatomy Single or First Gestation       Impression  Pregnancy at 15w6d  Fetal status reassuring.   Activity and Exercise discussed.    Plan  Anatomy ultrasound ordered for 4 weeks  Begin 81 mg baby aspirin, nightly.  Chronic hypertension in pregnancy -normotensive blood pressure, no protein in urine.  Review of blood pressure logs with normal values.  Begin baby aspirin nightly.  Continue with spot checking blood pressures.  Baseline labs reviewed.  Urine protein creatinine ratio still needed.  This will be performed today.  Lightheaded and dizziness -increase water intake.  Recommendation for electrolyte rich drink once a day.  Holter monitor ordered.  Return to office in 4 weeks      Patient was counseled to the following pregnancy precautions:  Dehydration precautions  Vaginal bleeding can be normal in pregnancy, this usually takes a form of spotting.  If having heavier bleeding like a menstrual period please present for evaluation; especially in light of severe abdominal pain this could represent a placental abruption.  Keep all scheduled appointments as recommended.        Zeb Elise MD  12/21/2023

## 2024-01-17 ENCOUNTER — TELEPHONE (OUTPATIENT)
Dept: OBSTETRICS AND GYNECOLOGY | Facility: CLINIC | Age: 24
End: 2024-01-17
Payer: COMMERCIAL

## 2024-01-17 NOTE — TELEPHONE ENCOUNTER
Caller: clive drummond    Relationship: Emergency Contact    Best call back number: [phone]    What was the call regarding: WOULD LIKE TO KNOW IF THEY CAN BRING THEIR 19MONTH OLD TO HER U/S AND OB FOLLOW UP APPT TOMORROW

## 2024-01-18 ENCOUNTER — ROUTINE PRENATAL (OUTPATIENT)
Dept: OBSTETRICS AND GYNECOLOGY | Facility: CLINIC | Age: 24
End: 2024-01-18
Payer: COMMERCIAL

## 2024-01-18 VITALS — WEIGHT: 143 LBS | BODY MASS INDEX: 23.08 KG/M2 | SYSTOLIC BLOOD PRESSURE: 119 MMHG | DIASTOLIC BLOOD PRESSURE: 80 MMHG

## 2024-01-18 DIAGNOSIS — Z34.80 SUPERVISION OF OTHER NORMAL PREGNANCY, ANTEPARTUM: Primary | ICD-10-CM

## 2024-01-18 LAB
GLUCOSE UR STRIP-MCNC: NEGATIVE MG/DL
PROT UR STRIP-MCNC: NEGATIVE MG/DL

## 2024-01-18 NOTE — PROGRESS NOTES
OB FOLLOW UP  Complaint   Chief Complaint   Patient presents with    Routine Prenatal Visit            Ana Paula Amanda is a 23 y.o.  19w6d patient being seen today for her obstetrical follow up visit. Patient reports increased fetal movement, no contractions, loss of fluid or vaginal bleeding.  Does have an appoint with cardiology tomorrow.  Has been having occasional spells of feeling lightheaded and heart racing this will resolve spontaneously after about 10 to 15 minutes of rest.  Compliant with prenatal vitamin and baby aspirin.  Still having some nausea and vomiting but is getting improvement with Phenergan.  No other acute complaints.    Her prenatal care is complicated by (and status) :    Patient Active Problem List   Diagnosis    Tachycardia    POTS (postural orthostatic tachycardia syndrome)    Irritable bowel syndrome with both constipation and diarrhea    Anxiety and depression    Chronic hypertension affecting pregnancy    Nausea and vomiting in pregnancy    Supervision of other normal pregnancy, antepartum       All other systems reviewed and are negative.     The additional following portions of the patient's history were reviewed and updated as appropriate: allergies, current medications, past family history, past medical history, past social history, past surgical history, and problem list.      EXAM:     Vital signs: /80   Wt 64.9 kg (143 lb)   LMP 2023   BMI 23.08 kg/m²   Appearance/psychiatric: To be in no distress  Constitutional: The patient is well nourished.  Cardiovascular: She does not have edema.  Respiratory: Respiratory effort is normal.  Gastrointestinal: Abdomen is soft, gravid, nontender, no rashes, heart tones are present, fundal height is size equals dates    Pelvic Exam: No    Urine glucose/protein: See prenatal flowsheet       Assessment and Plan    Problem List Items Addressed This Visit          Gravid and     Supervision of other normal  pregnancy, antepartum - Primary    Overview     Initial visit:  PNL: 11/21/2023   PAP/GC/CT/Urine culture: NILM 11/2021; 11/21/2023   Blood type: O+  Hx of HSV?:  none    Genetic testing:    NIPS, 11/21/2023 - low risk XY ; CF previously performed, negative     Vaccinations:  COVID:  11/21/2023 s/p vaccination  Influenza: 11/21/2023 s/p vaccination   RSV    24-28 weeks:  1hr GCT:  Hct/Plt:  Tdap Rx      Third Trimester:  GBS  Breast pump:    Ultrasound:  Dating:  L = 7w5d   Anatomy:  1/18/2024 EFW (21) AC (31) normal anatomical study.  Vertex presentation anterior placenta grade 1.  Genitalia male.  Follow-up as clinically indicated  Follow up:     PLAN:             Relevant Orders    POC Urinalysis Dipstick (Completed)       Impression  Pregnancy at 19w6d  Fetal status reassuring.   Activity and Exercise discussed.    Plan  Anatomy ultrasound reviewed with patient and spouse.  Limitations of ultrasound discussed with patient.  Appropriate fetal growth observed on ultrasound.  No anatomical abnormalities observed.  Follow-up as clinically indicated  History of POTS and tachycardia.  Does have follow-up appointment with cardiology tomorrow.  It appears that a Holter monitor has been ordered for patient previously.  Appreciate their input  Return to office in 5 weeks for glucose tolerance testing and CBC for second trimester labs      Patient was counseled to the following pregnancy precautions:  Decreased fetal movement, if concern for decreased fetal movement please perform fetal kick counts you are looking for 10 movements in 2 hours.  If concern for fetal movement and not meeting that criteria, please present to triage for evaluation.  Contractions occurring every 5 minutes for over an hour, lasting 30 to 60 seconds and progressively causing more discomfort, please seek medical attention to rule out labor  If you believe that your water is broken, place a sanitary pad.  If pad fills in short period of time i.e.  less than 5 minutes, take off pad placed another pad.  If this is saturated please present for rule out rupture of membranes  Vaginal bleeding can be normal in pregnancy, this usually takes a form of spotting.  If having heavier bleeding like a menstrual period please present for evaluation; especially in light of severe abdominal pain this could represent a placental abruption.  Keep all scheduled appointments as recommended.        Zeb Elise MD  01/18/2024

## 2024-02-06 ENCOUNTER — OFFICE VISIT (OUTPATIENT)
Dept: CARDIOLOGY | Facility: CLINIC | Age: 24
End: 2024-02-06
Payer: COMMERCIAL

## 2024-02-06 VITALS
HEART RATE: 100 BPM | WEIGHT: 150 LBS | OXYGEN SATURATION: 98 % | HEIGHT: 66 IN | SYSTOLIC BLOOD PRESSURE: 110 MMHG | DIASTOLIC BLOOD PRESSURE: 73 MMHG | BODY MASS INDEX: 24.11 KG/M2

## 2024-02-06 DIAGNOSIS — R00.2 PALPITATIONS: Primary | ICD-10-CM

## 2024-02-06 DIAGNOSIS — G90.A POTS (POSTURAL ORTHOSTATIC TACHYCARDIA SYNDROME): ICD-10-CM

## 2024-02-06 PROCEDURE — 99213 OFFICE O/P EST LOW 20 MIN: CPT

## 2024-02-06 NOTE — PROGRESS NOTES
Chief Complaint  Follow-up (Been getting light headed and dizzy feels like her heart beating out of her chest more)    Subjective        History of Present Illness  Ana Paula Amanda presents to [facility] for follow up.  Patient is a 23-year-old female with past medical history outlined below, significant for POTS who is currently 22 weeks pregnant and presents for follow-up.  She has occasional episodes of chest discomfort which generally occur during her palpitations.  Her palpitations are sporadic and feel like a pounding in her chest.  She sometimes gets dizzy just sitting still and feels like the room is spinning.  She denies any syncopal episodes.  She has shortness of breath with heavy exertion.      Past Medical History:   Diagnosis Date    Anxiety     Chronic hypertension affecting pregnancy 11/21/2023    Depression     Heart valve disease     Noted on previous echo    Lactose intolerance     Nausea and vomiting in pregnancy 11/11/2021    Zofran ODT 4 mg as needed Phenergan 12.5 mg rectal suppository as needed    Orthostatic hypotension     POTS (postural orthostatic tachycardia syndrome)     Tachycardia        ALLERGY  Allergies   Allergen Reactions    Lactose Intolerance (Gi) GI Intolerance        Past Surgical History:   Procedure Laterality Date    MANDIBLE SURGERY  2020    Reconstructive Jaw Surgery        Social History     Socioeconomic History    Marital status:    Tobacco Use    Smoking status: Never     Passive exposure: Never    Smokeless tobacco: Never   Vaping Use    Vaping Use: Never used   Substance and Sexual Activity    Alcohol use: Not Currently     Comment: socially    Drug use: Never    Sexual activity: Yes     Partners: Male     Birth control/protection: None       Family History   Problem Relation Age of Onset    Heart disease Mother     Hyperlipidemia Mother     Stroke Paternal Grandfather     Heart disease Paternal Grandmother     Hypertension  "Paternal Grandmother     Clotting disorder Paternal Grandmother     Heart attack Paternal Grandmother     Heart disease Maternal Grandmother     Lung cancer Maternal Grandmother     Heart disease Maternal Grandfather     Melanoma Maternal Grandfather     Hyperlipidemia Father     Hypertension Father     Clotting disorder Paternal Aunt     Breast cancer Neg Hx     Ovarian cancer Neg Hx     Uterine cancer Neg Hx     Colon cancer Neg Hx     Deep vein thrombosis Neg Hx     Pulmonary embolism Neg Hx         Current Outpatient Medications on File Prior to Visit   Medication Sig    aspirin 81 MG EC tablet Take 1 tablet by mouth Every Night.    Prenatal Vit-Fe Fumarate-FA (Prenatal Vitamin) 27-0.8 MG tablet Take 1 tablet by mouth Daily.    promethazine (PHENERGAN) 25 MG tablet Take 1 tablet by mouth Every 6 (Six) Hours As Needed for Nausea or Vomiting.     No current facility-administered medications on file prior to visit.       Objective   Vitals:    02/06/24 1350   BP: 110/73   BP Location: Left arm   Patient Position: Sitting   Cuff Size: Adult   Pulse: 100   SpO2: 98%   Weight: 68 kg (150 lb)   Height: 167.6 cm (66\")       Physical Exam  Constitutional:       General: She is awake. She is not in acute distress.     Appearance: Normal appearance.      Comments: Pregnant abdomen   HENT:      Head: Normocephalic.      Nose: Nose normal. No congestion.   Eyes:      Extraocular Movements: Extraocular movements intact.      Conjunctiva/sclera: Conjunctivae normal.      Pupils: Pupils are equal, round, and reactive to light.   Neck:      Thyroid: No thyromegaly.      Vascular: No JVD.   Cardiovascular:      Rate and Rhythm: Normal rate and regular rhythm.      Chest Wall: PMI is not displaced.      Pulses: Normal pulses.      Heart sounds: Normal heart sounds, S1 normal and S2 normal. No murmur heard.     No friction rub. No gallop. No S3 or S4 sounds.   Pulmonary:      Effort: Pulmonary effort is normal.      Breath sounds: " Normal breath sounds. No wheezing, rhonchi or rales.   Abdominal:      General: Bowel sounds are normal.      Palpations: Abdomen is soft.      Tenderness: There is no abdominal tenderness.   Musculoskeletal:      Cervical back: No tenderness.      Right lower leg: No edema.      Left lower leg: No edema.   Lymphadenopathy:      Cervical: No cervical adenopathy.   Skin:     General: Skin is warm and dry.      Capillary Refill: Capillary refill takes less than 2 seconds.      Coloration: Skin is not cyanotic.      Findings: No petechiae or rash.      Nails: There is no clubbing.   Neurological:      Mental Status: She is alert.   Psychiatric:         Mood and Affect: Mood normal.         Behavior: Behavior is cooperative.           Result Review     The following data was reviewed by PARIS Gunn on 02/06/24.      CMP          2/19/2023    12:21 9/12/2023    15:03 11/21/2023    11:09   CMP   Glucose 86  80  72    BUN 17  11  10    Creatinine 0.84  0.89  0.60    EGFR 99.7  93.0  128.7    Sodium 141  141  134    Potassium 3.6  4.3  3.7    Chloride 105  105  99    Calcium 9.5  9.7  9.6    Total Protein 7.1  7.4  7.4    Albumin 4.7  5.2  4.7    Globulin 2.4  2.2  2.7    Total Bilirubin 0.5  0.7  0.3    Alkaline Phosphatase 81  60  59    AST (SGOT) 17  19  20    ALT (SGPT) 12  15  13    Albumin/Globulin Ratio 2.0  2.4  1.7    BUN/Creatinine Ratio 20.2  12.4  16.7    Anion Gap 14.5  10.0  14.0      CBC w/diff          2/19/2023    12:21 9/12/2023    15:03 11/21/2023    11:09   CBC w/Diff   WBC 6.01  6.20  8.48    RBC 4.29  4.34  4.59    Hemoglobin 13.2  13.6  14.4    Hematocrit 37.6  39.4  42.4    MCV 87.6  90.8  92.4    MCH 30.8  31.3  31.4    MCHC 35.1  34.5  34.0    RDW 12.3  11.8  11.8    Platelets 209  224  212    Neutrophil Rel % 48.7   66.7    Immature Granulocyte Rel % 0.2   0.4    Lymphocyte Rel % 40.6   24.9    Monocyte Rel % 8.5   6.7    Eosinophil Rel % 1.2   0.8    Basophil Rel % 0.8   0.5        Lipid Panel          9/12/2023    15:03   Lipid Panel   Total Cholesterol 181    Triglycerides 76    HDL Cholesterol 61    VLDL Cholesterol 14    LDL Cholesterol  106    LDL/HDL Ratio 1.72        Results for orders placed during the hospital encounter of 09/12/23    Adult Transthoracic Echo Complete W/ Cont if Necessary Per Protocol    Interpretation Summary    Left ventricular systolic function is hyperdynamic (EF > 70%). Calculated left ventricular EF = 70.1%    Left ventricular diastolic function was normal.                  Procedures    Assessment & Plan  Diagnoses and all orders for this visit:    1. Palpitations (Primary)    2. POTS (postural orthostatic tachycardia syndrome)      1.  Patient continues to have episodes of palpitations.  Recent Holter monitor was benign.  Patient was reassured.    2.  She was advised to continue to increase her water intake, increase her salt intake, wear compression stockings if needed.  Unfortunately most of the medications we would use to treat her POTS are contraindicated in pregnancy and nursing.  Can consider these in the future when she is no longer pregnant and/or nursing.  In the meantime lifestyle modifications are strongly advised.                Follow Up   Return in about 6 months (around 8/6/2024) for With Dr. Grullon.    Patient was given instructions and counseling regarding her condition or for health maintenance advice. Please see specific information pulled into the AVS if appropriate.     Elizabeth Hernandez, APRN  02/06/24  14:11 EST    Dictated Utilizing Dragon Dictation

## 2024-02-08 ENCOUNTER — TELEPHONE (OUTPATIENT)
Dept: OBSTETRICS AND GYNECOLOGY | Facility: CLINIC | Age: 24
End: 2024-02-08
Payer: COMMERCIAL

## 2024-02-13 NOTE — PROGRESS NOTES
OB FOLLOW UP      Chief Complaint   Patient presents with    Routine Prenatal Visit     Subjective:   LBP    Objective:  /81   Wt 68 kg (150 lb)   LMP 09/01/2023   BMI 24.21 kg/m²  9.072 kg (20 lb) Facility age limit for growth %rosetta is 20 years.  See OB flow sheet for fundal height (not performed if US day of OV), FHT, edema, cvx exam if performed, and Upro/Uglu  Chaperone present during pelvic exam if performed.      Assessment and Plan:  24w3d     Diagnoses and all orders for this visit:    1. Supervision of other normal pregnancy, antepartum (Primary)  Overview:  Initial visit:  PNL: 11/21/2023   PAP/GC/CT/Urine culture: NILM 11/2021; 11/21/2023   Blood type: O+  Hx of HSV?:  none    Genetic testing:    NIPS low risk XY;  CF previously performed, negative     Vaccinations:  COVID: Recommended  Influenza: Vaccinated    24-28 weeks:  1hr GCT: 2/19/2024   Hct/Plt: 2/19/2024   Tdap Rx: 2/19/2024  ? Desires sterilization:  Declines 2/19/2024     Third Trimester:  GBS  Breast pump:    Ultrasound:  Dating:  L = 7w5d   Anatomy:  1/18/2024 EFW (21) AC (31) normal anatomical study.  Vertex presentation anterior placenta grade 1.  Genitalia male.      Problem list/PLAN:  CHTN  Anxiety Depression    Orders:  -     POC Urinalysis Dipstick  -     CBC (No Diff)  -     Gestational Diabetes Screen 1 Hour    2. Chronic hypertension affecting pregnancy  Overview:  11/21/2023 reports being seen by cardiologist. Previously on metoprolol and and labetalol.   No meds currently.   Baseline labs wnl, UPC 0.07    Check BP at home, goal 130/80  Continue ASA  APT  Growth US      3. Anxiety and depression  Overview:  Hx of Prozac 20 mg daily  No meds currently  11/21/2023 EPDS: 14 at NOB. No SI/HI; has counselor; wants to start with CBT over medications.   2/19/2024 Has selene Esquivel 12March.  Anxiety severe. 2/19/2024 discussed SSRI or doxylamine.  Rx doxylamine    Orders:  -     doxylamine (UNISOM) 25 MG tablet; Take 1 tablet  by mouth At Night As Needed for Sleep or Nausea (or anxiety). May take and extra 1/2 tablet PRN persistent nausea or anxiety  Dispense: 30 tablet; Refill: 1      Counseling:    OB precautions, leaking, VB, isidro wilkins vs PTL/Labor  HTN precautions reviewed: HA, vision change, RUQ/epigastric pain, edema  ANXIETY/DEPRESSION- We discussed treatment options R/B/A/SE/E expectant, THERAPY, SSRI, vs SSRI + Therapy.  Non medical options usually recommended first.  OVI-  adaptation syndrome reviewed.  Weaning in third trimester if possible, newer evidence doesn't suggest the need. Some studies indicate child w increased risk Dep/Anx w SSRI use in preg.  Black box warning.  Recommend avoid abrupt discontinuation.  BACK PAIN discussed.  Recommend conservative measures heat, warm bath, pregnancy support belt, tylenol prn.  VACCINE importance in pregnancy discussed.  Maternal and fetal risk of not being vaccinated reviewed NLT increased risk maternal/fetal severity of illness/death, PTD, CS, hemorrhage, HTN, possible IUFD.  Significant maternal and fetal/infant benefit w vaccination.  FDA approval and ACOG/SMFM/CDC strong recommendation in pregnancy.  Questions answered.       Reassuring pregnancy progress. Questions answered  Continue PNV.  Importance of healthy eating, obtaining sufficient sleep, and staying active unless hypertensive- activity modified as directed.    Return in about 4 weeks (around 3/18/2024) for FU OB then q 2weeks x2.            Brittany Rossi, DO  2024    [facility]  [facility] [place] [address]  Dept: [phone]  Dept Fax: [phone]  Loc: [phone]  Loc Fax: [phone]

## 2024-02-18 PROBLEM — R00.0 TACHYCARDIA: Status: RESOLVED | Noted: 2021-08-18 | Resolved: 2024-02-18

## 2024-02-18 PROBLEM — G90.A POTS (POSTURAL ORTHOSTATIC TACHYCARDIA SYNDROME): Status: RESOLVED | Noted: 2021-09-23 | Resolved: 2024-02-18

## 2024-02-19 ENCOUNTER — ROUTINE PRENATAL (OUTPATIENT)
Dept: OBSTETRICS AND GYNECOLOGY | Facility: CLINIC | Age: 24
End: 2024-02-19
Payer: COMMERCIAL

## 2024-02-19 VITALS — DIASTOLIC BLOOD PRESSURE: 81 MMHG | WEIGHT: 150 LBS | BODY MASS INDEX: 24.21 KG/M2 | SYSTOLIC BLOOD PRESSURE: 113 MMHG

## 2024-02-19 DIAGNOSIS — Z34.80 SUPERVISION OF OTHER NORMAL PREGNANCY, ANTEPARTUM: Primary | ICD-10-CM

## 2024-02-19 DIAGNOSIS — F32.A ANXIETY AND DEPRESSION: ICD-10-CM

## 2024-02-19 DIAGNOSIS — F41.9 ANXIETY AND DEPRESSION: ICD-10-CM

## 2024-02-19 DIAGNOSIS — O10.919 CHRONIC HYPERTENSION AFFECTING PREGNANCY: ICD-10-CM

## 2024-02-19 LAB
DEPRECATED RDW RBC AUTO: 39.1 FL (ref 37–54)
ERYTHROCYTE [DISTWIDTH] IN BLOOD BY AUTOMATED COUNT: 11.8 % (ref 12.3–15.4)
GLUCOSE 1H P GLC SERPL-MCNC: 117 MG/DL (ref 65–139)
GLUCOSE UR STRIP-MCNC: NEGATIVE MG/DL
HCT VFR BLD AUTO: 35 % (ref 34–46.6)
HGB BLD-MCNC: 11.8 G/DL (ref 12–15.9)
MCH RBC QN AUTO: 30.6 PG (ref 26.6–33)
MCHC RBC AUTO-ENTMCNC: 33.7 G/DL (ref 31.5–35.7)
MCV RBC AUTO: 90.7 FL (ref 79–97)
PLATELET # BLD AUTO: 202 10*3/MM3 (ref 140–450)
PMV BLD AUTO: 12.4 FL (ref 6–12)
PROT UR STRIP-MCNC: NEGATIVE MG/DL
RBC # BLD AUTO: 3.86 10*6/MM3 (ref 3.77–5.28)
WBC NRBC COR # BLD AUTO: 8.94 10*3/MM3 (ref 3.4–10.8)

## 2024-02-19 PROCEDURE — 36415 COLL VENOUS BLD VENIPUNCTURE: CPT | Performed by: OBSTETRICS & GYNECOLOGY

## 2024-02-19 PROCEDURE — 0502F SUBSEQUENT PRENATAL CARE: CPT | Performed by: OBSTETRICS & GYNECOLOGY

## 2024-02-19 PROCEDURE — 85027 COMPLETE CBC AUTOMATED: CPT | Performed by: OBSTETRICS & GYNECOLOGY

## 2024-02-19 PROCEDURE — 82950 GLUCOSE TEST: CPT | Performed by: OBSTETRICS & GYNECOLOGY

## 2024-03-11 NOTE — PROGRESS NOTES
OB FOLLOW UP      Chief Complaint   Patient presents with    Routine Prenatal Visit     Subjective:   Urinary freq, small amts, no dysuria.  No hematuria.     Objective:  /77   Wt 71.6 kg (157 lb 12.8 oz)   LMP 09/01/2023   BMI 25.47 kg/m²  12.6 kg (27 lb 12.8 oz) Facility age limit for growth %rosetta is 20 years.  See OB flow sheet for fundal height (not performed if US day of OV), FHT, edema, cvx exam if performed, and Upro/Uglu  Chaperone present during pelvic exam if performed.      Assessment and Plan:  28w3d     Diagnoses and all orders for this visit:    1. Supervision of other normal pregnancy, antepartum (Primary)  Overview:  Initial visit:  PNL: 11/21/2023   PAP/GC/CT/Urine culture: NILM 11/2021; 11/21/2023   Blood type: O+  Hx of HSV?:  none    Genetic testing:    NIPS low risk XY;  CF previously performed, negative     Vaccinations:  COVID: Recommended  Influenza: Vaccinated    24-28 weeks:  1hr GCT: 2/19/2024 wnl  Hct/Plt: 2/19/2024 35/202  Tdap Rx: Recommended s/p Rx  ? Desires sterilization:  Declined    Third Trimester:  GBS  Breast pump:    Ultrasound:  Dating:  L = 7w5d   Anatomy:  1/18/2024 EFW (21) AC (31) normal anatomical study.  Vertex presentation anterior placenta grade 1.  Genitalia male.      Problem list/PLAN:  CHTN  Anxiety Depression    Orders:  -     POC Urinalysis Dipstick  -     POC Urinalysis Dipstick    2. Chronic hypertension affecting pregnancy  Overview:  11/21/2023 reports being seen by cardiologist. Previously on metoprolol and and labetalol.   No meds currently.   Baseline labs wnl, UPC 0.07    Check BP at home, goal 130/80   3/18/2024 Bps at home wnl  Continue ASA  APT  Growth US      3. Anxiety and depression  Overview:  Hx of Prozac 20 mg daily  No meds currently  11/21/2023 EPDS: 14 at NOB. No SI/HI; has counselor; wants to start with CBT over medications.   2/19/2024 Has selene Esquivel 12March.  Anxiety severe. Rx doxylamine  3/18/2024 Doing ok, doing better w  doxylamine.  Has FU marquise Esquivel soon.       4. Urinary frequency  -     Urine Culture - Urine, Urine, Clean Catch      Counseling:    OB precautions, leaking, VB, isidro wilkins vs PTL/Labor  [facility]  Discussed exercise in pregnancy    Reassuring pregnancy progress. Questions answered  Continue PNV.  Importance of healthy eating, obtaining sufficient sleep, and staying active unless hypertensive- activity modified as directed.    Return in about 2 weeks (around 4/1/2024) for FU OB q2wks to 36weeks.            Brittany Rossi,   03/18/2024    [facility]  [address] [place] [address]  Dept: [phone]  Dept Fax: [phone]  Loc: [phone]  Loc Fax: [phone]

## 2024-03-18 ENCOUNTER — ROUTINE PRENATAL (OUTPATIENT)
Dept: OBSTETRICS AND GYNECOLOGY | Facility: CLINIC | Age: 24
End: 2024-03-18

## 2024-03-18 VITALS — BODY MASS INDEX: 25.47 KG/M2 | DIASTOLIC BLOOD PRESSURE: 77 MMHG | SYSTOLIC BLOOD PRESSURE: 122 MMHG | WEIGHT: 157.8 LBS

## 2024-03-18 DIAGNOSIS — R35.0 URINARY FREQUENCY: ICD-10-CM

## 2024-03-18 DIAGNOSIS — F41.9 ANXIETY AND DEPRESSION: ICD-10-CM

## 2024-03-18 DIAGNOSIS — F32.A ANXIETY AND DEPRESSION: ICD-10-CM

## 2024-03-18 DIAGNOSIS — O10.919 CHRONIC HYPERTENSION AFFECTING PREGNANCY: ICD-10-CM

## 2024-03-18 DIAGNOSIS — Z34.80 SUPERVISION OF OTHER NORMAL PREGNANCY, ANTEPARTUM: Primary | ICD-10-CM

## 2024-03-18 LAB
BILIRUB BLD-MCNC: NEGATIVE MG/DL
GLUCOSE UR STRIP-MCNC: NEGATIVE MG/DL
GLUCOSE UR STRIP-MCNC: NEGATIVE MG/DL
KETONES UR QL: NEGATIVE
LEUKOCYTE EST, POC: ABNORMAL
NITRITE UR-MCNC: NEGATIVE MG/ML
PH UR: 5 [PH] (ref 5–8)
PROT UR STRIP-MCNC: NEGATIVE MG/DL
PROT UR STRIP-MCNC: NEGATIVE MG/DL
RBC # UR STRIP: NEGATIVE /UL
SP GR UR: 1.02 (ref 1–1.03)
UROBILINOGEN UR QL: NORMAL

## 2024-03-18 PROCEDURE — 87086 URINE CULTURE/COLONY COUNT: CPT | Performed by: OBSTETRICS & GYNECOLOGY

## 2024-03-20 LAB — BACTERIA SPEC AEROBE CULT: NORMAL

## 2024-03-28 NOTE — PROGRESS NOTES
Routine Prenatal Visit     Subjective  Ana Paula Amanda is a 23 y.o.  at 30w5d here for her routine OB visit.   She is taking her prenatal vitamins.Reports no loss of fluid or vaginal bleeding. Patient doing well. Admits to contractions and back pain. Pregnancy complicated by:     Patient Active Problem List   Diagnosis    Irritable bowel syndrome with both constipation and diarrhea    Anxiety and depression    Chronic hypertension affecting pregnancy    Supervision of other normal pregnancy, antepartum         OB History    Para Term  AB Living   2 1 1 0 0 1   SAB IAB Ectopic Molar Multiple Live Births   0 0 0 0 0 1      # Outcome Date GA Lbr King/2nd Weight Sex Type Anes PTL Lv   2 Current            1 Term 20 39w6d 09:59 / 01:21 3825 g (8 lb 6.9 oz) F Vag-Spont EPI N ALIX           ROS:   General ROS: negative for - chills or fatigue  Genito-Urinary ROS: negative for  change in urinary stream, vaginal discharge     Objective  Physical Exam:   Vitals:    24 1006   BP: 121/75       Uterine Size: size equals dates  FHT: 110-160 BPM    General appearance - alert, well appearing, and in no distress  Abdomen- Soft, Gravid uterus, non-tender to palpation  Extremeties: negative swelling   SVE with chaperone present: closed    Assessment/Plan:   Diagnoses and all orders for this visit:    1. Supervision of other normal pregnancy, antepartum (Primary)  Assessment & Plan:  Initial visit:  PNL: 2023   PAP/GC/CT/Urine culture: NILM 2021; 2023   Blood type: O+  Hx of HSV?:  none     Genetic testing:    NIPS low risk XY;  CF previously performed, negative      Vaccinations:  COVID: Recommended  Influenza: Vaccinated     24-28 weeks:  1hr GCT: 2024 wnl  Hct/Plt: 2024  Tdap Rx: Recommended s/p Rx  ? Desires sterilization:  Declined     Third Trimester:  GBS  Breast pump:     Ultrasound:  Dating:  L = 7w5d   Anatomy:  2024 EFW (21) AC (31) normal anatomical  study.  Vertex presentation anterior placenta grade 1.  Genitalia male.       Problem list/PLAN:  CHTN  Anxiety Depression       Orders:  -     POC Urinalysis Dipstick  -     US Ob 14 + Weeks Single or First Gestation; Future    2. Chronic hypertension affecting pregnancy            Counseling:   OB precautions, leaking, VB, isidro wilkins vs PTL/Labor  [facility]  Round Ligament Pain:  The uterus has several ligaments which provide support and keep the uterus in place. As the  uterus grows these ligaments are pulled and stretched which often causes sharp stabbing like pain in the inguinal area.   You may find a pregnancy support band helpful. Changing positions may also help. Yoga is a great way to cope with round ligament and low back pain in pregnancy.    Massage may also help with low back pain   Things to Consider at this Point in your Pregnancy:  Some women experience swelling in their feet during pregnancy. Compression stockings may help  Drink plenty of water and stay active   Make sure you are eating frequent small meals, nuts are a wonderful snack to keep with you            Return in about 2 weeks (around 4/17/2024) for Routine OB visit.      We have gone over prenatal care to include the timing and content of visits. I informed her how to contact the office and/or on call person in the event of any problems and encouraged her to do so when she feels it is necessary.  We then spent time answering her questions which she indicated were answered to her satisfaction.    Anais Verdugo DO  4/3/2024 10:44 EDT

## 2024-04-03 ENCOUNTER — ROUTINE PRENATAL (OUTPATIENT)
Dept: OBSTETRICS AND GYNECOLOGY | Facility: CLINIC | Age: 24
End: 2024-04-03
Payer: COMMERCIAL

## 2024-04-03 VITALS — SYSTOLIC BLOOD PRESSURE: 121 MMHG | WEIGHT: 161.2 LBS | DIASTOLIC BLOOD PRESSURE: 75 MMHG | BODY MASS INDEX: 26.02 KG/M2

## 2024-04-03 DIAGNOSIS — Z34.80 SUPERVISION OF OTHER NORMAL PREGNANCY, ANTEPARTUM: Primary | ICD-10-CM

## 2024-04-03 DIAGNOSIS — O10.919 CHRONIC HYPERTENSION AFFECTING PREGNANCY: ICD-10-CM

## 2024-04-03 LAB
GLUCOSE UR STRIP-MCNC: NEGATIVE MG/DL
PROT UR STRIP-MCNC: NEGATIVE MG/DL

## 2024-04-03 NOTE — ASSESSMENT & PLAN NOTE
Initial visit:  PNL: 11/21/2023   PAP/GC/CT/Urine culture: NILM 11/2021; 11/21/2023   Blood type: O+  Hx of HSV?:  none     Genetic testing:    NIPS low risk XY;  CF previously performed, negative      Vaccinations:  COVID: Recommended  Influenza: Vaccinated     24-28 weeks:  1hr GCT: 2/19/2024 wnl  Hct/Plt: 2/19/2024 35/202  Tdap Rx: Recommended s/p Rx  ? Desires sterilization:  Declined     Third Trimester:  GBS  Breast pump:     Ultrasound:  Dating:  L = 7w5d   Anatomy:  1/18/2024 EFW (21) AC (31) normal anatomical study.  Vertex presentation anterior placenta grade 1.  Genitalia male.       Problem list/PLAN:  CHTN  Anxiety Depression

## 2024-04-17 ENCOUNTER — ROUTINE PRENATAL (OUTPATIENT)
Dept: OBSTETRICS AND GYNECOLOGY | Facility: CLINIC | Age: 24
End: 2024-04-17
Payer: COMMERCIAL

## 2024-04-17 VITALS — SYSTOLIC BLOOD PRESSURE: 117 MMHG | WEIGHT: 161 LBS | DIASTOLIC BLOOD PRESSURE: 83 MMHG | BODY MASS INDEX: 25.99 KG/M2

## 2024-04-17 DIAGNOSIS — O10.919 CHRONIC HYPERTENSION AFFECTING PREGNANCY: ICD-10-CM

## 2024-04-17 DIAGNOSIS — F41.9 ANXIETY AND DEPRESSION: ICD-10-CM

## 2024-04-17 DIAGNOSIS — F32.A ANXIETY AND DEPRESSION: ICD-10-CM

## 2024-04-17 DIAGNOSIS — Z34.80 SUPERVISION OF OTHER NORMAL PREGNANCY, ANTEPARTUM: Primary | ICD-10-CM

## 2024-04-17 DIAGNOSIS — N94.9 ROUND LIGAMENT PAIN: ICD-10-CM

## 2024-04-17 PROBLEM — O26.03 EXCESSIVE WEIGHT GAIN DURING PREGNANCY IN THIRD TRIMESTER: Status: ACTIVE | Noted: 2024-04-17

## 2024-04-17 LAB
GLUCOSE UR STRIP-MCNC: NEGATIVE MG/DL
PROT UR STRIP-MCNC: NEGATIVE MG/DL

## 2024-04-17 NOTE — PROGRESS NOTES
OB FOLLOW UP  Complaint   Chief Complaint   Patient presents with    Routine Prenatal Visit            Ana Paula Amanda is a 23 y.o.  32w5d patient being seen today for her obstetrical follow up visit. Patient denies decreased fetal movement, contractions, loss of fluid or vaginal bleeding. Having lower pelvic discomfort radiates to back.  No pain with urination, no fevers or chills.  Compliant with medications.    Her prenatal care is complicated by (and status) :    Patient Active Problem List   Diagnosis    Irritable bowel syndrome with both constipation and diarrhea    Anxiety and depression    Chronic hypertension affecting pregnancy    Supervision of other normal pregnancy, antepartum       All other systems reviewed and are negative.     The additional following portions of the patient's history were reviewed and updated as appropriate: allergies, current medications, past family history, past medical history, past social history, past surgical history, and problem list.      EXAM:     Vital signs: /83   Wt 73 kg (161 lb)   LMP 2023   BMI 25.99 kg/m²   Appearance/psychiatric: To be in no distress  Constitutional: The patient is well nourished.  Cardiovascular: She does not have edema.  Respiratory: Respiratory effort is normal.  Gastrointestinal: Abdomen is soft, gravid, nontender, no rashes, heart tones are present, fundal height is size equals dates    Pelvic Exam: No    Urine glucose/protein: See prenatal flowsheet       Assessment and Plan    Problem List Items Addressed This Visit          Gravid and     Supervision of other normal pregnancy, antepartum - Primary    Overview     Initial visit:  PNL: 2023   PAP/GC/CT/Urine culture: NILM 2021; 2023   Blood type: O+  Hx of HSV?:  none    Genetic testing:    NIPS low risk XY;  CF previously performed, negative     Vaccinations:  COVID: Recommended  Influenza: Vaccinated    24-28 weeks:  1hr GCT: 2024  wnl  Hct/Plt: 2/19/2024 35/202  Tdap Rx: Recommended s/p Rx  ? Desires sterilization:  Declined    Third Trimester:  GBS  Breast pump:    Ultrasound:  Dating:  L = 7w5d   Anatomy:  1/18/2024 EFW (21) AC (31) normal anatomical study.  Vertex presentation anterior placenta grade 1.  Genitalia male.      Problem list/PLAN:  CHTN  Anxiety Depression         Relevant Orders    POC Urinalysis Dipstick (Completed)       Impression  Pregnancy at 32w5d  Fetal status reassuring.   Activity and Exercise discussed.    Plan  Round ligament pain -discussion of conservative interventions, Tylenol as needed  Excessive maternal weight gain in pregnancy -discussion regards to diet and exercise.  Chronic hypertension in pregnancy -continue with baby aspirin daily. Preeclampsia precautions.   Return to office in 2 weeks      Patient was counseled to the following pregnancy precautions:  Decreased fetal movement, if concern for decreased fetal movement please perform fetal kick counts you are looking for 10 movements in 2 hours.  If concern for fetal movement and not meeting that criteria, please present to triage for evaluation.  Contractions occurring every 5 minutes for over an hour, lasting 30 to 60 seconds and progressively causing more discomfort, please seek medical attention to rule out labor  If you believe that your water is broken, place a sanitary pad.  If pad fills in short period of time i.e. less than 5 minutes, take off pad placed another pad.  If this is saturated please present for rule out rupture of membranes  Vaginal bleeding can be normal in pregnancy, this usually takes a form of spotting.  If having heavier bleeding like a menstrual period please present for evaluation; especially in light of severe abdominal pain this could represent a placental abruption.  Keep all scheduled appointments as recommended.        Zeb Elise MD  04/17/2024

## 2024-05-01 ENCOUNTER — ROUTINE PRENATAL (OUTPATIENT)
Dept: OBSTETRICS AND GYNECOLOGY | Facility: CLINIC | Age: 24
End: 2024-05-01
Payer: COMMERCIAL

## 2024-05-01 VITALS — BODY MASS INDEX: 26.15 KG/M2 | SYSTOLIC BLOOD PRESSURE: 121 MMHG | DIASTOLIC BLOOD PRESSURE: 87 MMHG | WEIGHT: 162 LBS

## 2024-05-01 DIAGNOSIS — G25.81 RESTLESS LEG SYNDROME: ICD-10-CM

## 2024-05-01 DIAGNOSIS — Z34.80 SUPERVISION OF OTHER NORMAL PREGNANCY, ANTEPARTUM: Primary | ICD-10-CM

## 2024-05-01 LAB
GLUCOSE UR STRIP-MCNC: NEGATIVE MG/DL
PROT UR STRIP-MCNC: NEGATIVE MG/DL

## 2024-05-01 RX ORDER — MAGNESIUM OXIDE 400 MG/1
400 TABLET ORAL DAILY
Qty: 30 TABLET | Refills: 2 | Status: SHIPPED | OUTPATIENT
Start: 2024-05-01

## 2024-05-01 NOTE — PROGRESS NOTES
OB FOLLOW UP  Complaint   Chief Complaint   Patient presents with    Routine Prenatal Visit            Ana Paula Amanda is a 23 y.o.  34w5d patient being seen today for her obstetrical follow up visit. Patient denies decreased fetal movement, contractions, loss of fluid or vaginal bleeding. Restless legs at night, improve with elevation. Pregnancy support belt helping with round ligament pain.  Tdap completed.     Her prenatal care is complicated by (and status) :    Patient Active Problem List   Diagnosis    Irritable bowel syndrome with both constipation and diarrhea    Anxiety and depression    Chronic hypertension affecting pregnancy    Supervision of other normal pregnancy, antepartum    Round ligament pain    Excessive weight gain during pregnancy in third trimester       All other systems reviewed and are negative.     The additional following portions of the patient's history were reviewed and updated as appropriate: allergies, current medications, past family history, past medical history, past social history, past surgical history, and problem list.      EXAM:     Vital signs: /87   Wt 73.5 kg (162 lb)   LMP 2023   BMI 26.15 kg/m²   Appearance/psychiatric: To be in no distress  Constitutional: The patient is well nourished.  Cardiovascular: She does not have edema.  Respiratory: Respiratory effort is normal.  Gastrointestinal: Abdomen is soft, gravid, nontender, no rashes, heart tones are present, fundal height is size equals dates    Pelvic Exam: No    Urine glucose/protein: See prenatal flowsheet       Assessment and Plan    Problem List Items Addressed This Visit          Gravid and     Supervision of other normal pregnancy, antepartum - Primary    Overview     Initial visit:  PNL: 2023   PAP/GC/CT/Urine culture: NILM 2021; 2023   Blood type: O+  Hx of HSV?:  none    Genetic testing:    NIPS low risk XY;  CF previously performed, negative      Vaccinations:  COVID: Recommended  Influenza: Vaccinated    24-28 weeks:  1hr GCT: 2/19/2024 wnl  Hct/Plt: 2/19/2024 35/202  Tdap Rx: Recommended s/p Rx  ? Desires sterilization:  Declined    Third Trimester:  GBS  Breast pump:    Ultrasound:  Dating:  L = 7w5d   Anatomy:  1/18/2024 EFW (21) AC (31) normal anatomical study.  Vertex presentation anterior placenta grade 1.  Genitalia male.      Problem list/PLAN:  CHTN  Anxiety Depression         Relevant Orders    POC Urinalysis Dipstick (Completed)       Impression  Pregnancy at 34w5d  Fetal status reassuring.   Activity and Exercise discussed.    Plan  Growth U/S with appropriate growth  Mg Ox to pharmacy for restless legs  RTO in 2 weeks       Patient was counseled to the following pregnancy precautions:  Decreased fetal movement, if concern for decreased fetal movement please perform fetal kick counts you are looking for 10 movements in 2 hours.  If concern for fetal movement and not meeting that criteria, please present to triage for evaluation.  Contractions occurring every 5 minutes for over an hour, lasting 30 to 60 seconds and progressively causing more discomfort, please seek medical attention to rule out labor  If you believe that your water is broken, place a sanitary pad.  If pad fills in short period of time i.e. less than 5 minutes, take off pad placed another pad.  If this is saturated please present for rule out rupture of membranes  Vaginal bleeding can be normal in pregnancy, this usually takes a form of spotting.  If having heavier bleeding like a menstrual period please present for evaluation; especially in light of severe abdominal pain this could represent a placental abruption.  Keep all scheduled appointments as recommended.        Zeb Elise MD  05/01/2024

## 2024-05-07 ENCOUNTER — TELEPHONE (OUTPATIENT)
Dept: URGENT CARE | Facility: CLINIC | Age: 24
End: 2024-05-07

## 2024-05-07 PROCEDURE — 87635 SARS-COV-2 COVID-19 AMP PRB: CPT | Performed by: NURSE PRACTITIONER

## 2024-05-07 PROCEDURE — 87081 CULTURE SCREEN ONLY: CPT | Performed by: NURSE PRACTITIONER

## 2024-05-15 ENCOUNTER — ROUTINE PRENATAL (OUTPATIENT)
Dept: OBSTETRICS AND GYNECOLOGY | Facility: CLINIC | Age: 24
End: 2024-05-15
Payer: COMMERCIAL

## 2024-05-15 VITALS — SYSTOLIC BLOOD PRESSURE: 114 MMHG | WEIGHT: 165 LBS | BODY MASS INDEX: 26.63 KG/M2 | DIASTOLIC BLOOD PRESSURE: 78 MMHG

## 2024-05-15 DIAGNOSIS — Z34.80 SUPERVISION OF OTHER NORMAL PREGNANCY, ANTEPARTUM: Primary | ICD-10-CM

## 2024-05-15 DIAGNOSIS — U07.1 COVID-19 VIRUS DETECTED: ICD-10-CM

## 2024-05-15 DIAGNOSIS — K59.01 SLOW TRANSIT CONSTIPATION: ICD-10-CM

## 2024-05-15 LAB
GLUCOSE UR STRIP-MCNC: NEGATIVE MG/DL
PROT UR STRIP-MCNC: NEGATIVE MG/DL

## 2024-05-15 PROCEDURE — 87653 STREP B DNA AMP PROBE: CPT | Performed by: STUDENT IN AN ORGANIZED HEALTH CARE EDUCATION/TRAINING PROGRAM

## 2024-05-15 PROCEDURE — 87081 CULTURE SCREEN ONLY: CPT | Performed by: STUDENT IN AN ORGANIZED HEALTH CARE EDUCATION/TRAINING PROGRAM

## 2024-05-15 RX ORDER — POLYETHYLENE GLYCOL 3350 17 G/17G
17 POWDER, FOR SOLUTION ORAL DAILY
Qty: 7 PACKET | Refills: 0 | Status: SHIPPED | OUTPATIENT
Start: 2024-05-15 | End: 2024-05-22

## 2024-05-15 NOTE — PROGRESS NOTES
OB FOLLOW UP  Complaint   Chief Complaint   Patient presents with    Routine Prenatal Visit            Ana Paula Amanda is a 23 y.o.  36w5d patient being seen today for her obstetrical follow up visit. Patient denies decreased fetal movement, contractions, loss of fluid or vaginal bleeding.  Was diagnosed with COVID last week.  Has been doing supportive care.  Was not treated with Paxlovid.  Has been noting irregular contractions nothing consistent.  Having difficulties with bowel movements.    Her prenatal care is complicated by (and status) :    Patient Active Problem List   Diagnosis    Irritable bowel syndrome with both constipation and diarrhea    Anxiety and depression    Chronic hypertension affecting pregnancy    Supervision of other normal pregnancy, antepartum    Round ligament pain    Excessive weight gain during pregnancy in third trimester       All other systems reviewed and are negative.     The additional following portions of the patient's history were reviewed and updated as appropriate: allergies, current medications, past family history, past medical history, past social history, past surgical history, and problem list.      EXAM:     Vital signs: /78   Wt 74.8 kg (165 lb)   LMP 2023   BMI 26.63 kg/m²   Appearance/psychiatric: To be in no distress  Constitutional: The patient is well nourished.  Cardiovascular: She does not have edema.  Respiratory: Respiratory effort is normal.  Gastrointestinal: Abdomen is soft, gravid, nontender, no rashes, heart tones are present, fundal height is size equals dates    Pelvic Exam: Yes.  Presentation: cephalic. Dilation: 1cm. Effacement: 50%. Station: -3.    Urine glucose/protein: See prenatal flowsheet       Assessment and Plan    Problem List Items Addressed This Visit          Gravid and     Supervision of other normal pregnancy, antepartum - Primary    Overview     Initial visit:  PNL: 2023   PAP/GC/CT/Urine culture:  NILM 11/2021; 11/21/2023   Blood type: O+  Hx of HSV?:  none    Genetic testing:    NIPS low risk XY;  CF previously performed, negative     Vaccinations:  COVID: Recommended  Influenza: Vaccinated    24-28 weeks:  1hr GCT: 2/19/2024 wnl  Hct/Plt: 2/19/2024 35/202  Tdap Rx: Recommended s/p Rx; done   ? Desires sterilization:  Declined    Third Trimester:  GBS  Breast pump:    Ultrasound:  Dating:  L = 7w5d   Anatomy:  1/18/2024 EFW (21) AC (31) normal anatomical study.  Vertex presentation anterior placenta grade 1.  Genitalia male.      Problem list/PLAN:  CHTN  Anxiety Depression          Other Visit Diagnoses       COVID-19 virus detected                Impression  Pregnancy at 36w5d  Fetal status reassuring.   Activity and Exercise discussed.    Plan  GBS swab today  Continue supportive care for history of COVID.  Begin weekly nonstress test on Fridays.  Preeclampsia precautions discussed  Patient desires elective induction of labor.  This will be scheduled for June 5 at 39 weeks and 5 days.  Return to office in 1 week      Patient was counseled to the following pregnancy precautions:  Decreased fetal movement, if concern for decreased fetal movement please perform fetal kick counts you are looking for 10 movements in 2 hours.  If concern for fetal movement and not meeting that criteria, please present to triage for evaluation.  Contractions occurring every 5 minutes for over an hour, lasting 30 to 60 seconds and progressively causing more discomfort, please seek medical attention to rule out labor  If you believe that your water is broken, place a sanitary pad.  If pad fills in short period of time i.e. less than 5 minutes, take off pad placed another pad.  If this is saturated please present for rule out rupture of membranes  Vaginal bleeding can be normal in pregnancy, this usually takes a form of spotting.  If having heavier bleeding like a menstrual period please present for evaluation; especially in light  of severe abdominal pain this could represent a placental abruption.  Keep all scheduled appointments as recommended.        Zeb Elise MD  05/15/2024

## 2024-05-16 ENCOUNTER — TELEPHONE (OUTPATIENT)
Dept: OBSTETRICS AND GYNECOLOGY | Facility: CLINIC | Age: 24
End: 2024-05-16
Payer: COMMERCIAL

## 2024-05-17 ENCOUNTER — HOSPITAL ENCOUNTER (OUTPATIENT)
Facility: HOSPITAL | Age: 24
Discharge: HOME | End: 2024-05-17
Attending: OBSTETRICS & GYNECOLOGY | Admitting: OBSTETRICS & GYNECOLOGY
Payer: COMMERCIAL

## 2024-05-17 ENCOUNTER — HOSPITAL ENCOUNTER (OUTPATIENT)
Dept: LABOR AND DELIVERY | Facility: HOSPITAL | Age: 24
Discharge: HOME | End: 2024-05-17
Payer: COMMERCIAL

## 2024-05-17 VITALS — DIASTOLIC BLOOD PRESSURE: 58 MMHG | OXYGEN SATURATION: 100 % | HEART RATE: 102 BPM | SYSTOLIC BLOOD PRESSURE: 90 MMHG

## 2024-05-17 PROCEDURE — G0463 HOSPITAL OUTPT CLINIC VISIT: HCPCS

## 2024-05-17 PROCEDURE — 59025 FETAL NON-STRESS TEST: CPT

## 2024-05-17 NOTE — NON STRESS TEST
Obstetrical Non-stress Test Interpretation     Name:  Ana Paula Amanda  MRN: [id number]    23 y.o. female  at 37w0d    Indication: Hx Covid      Fetal Assessment  Fetal Movement: active  Fetal HR Assessment Method: external  Fetal HR (beats/min): 140  Fetal HR Baseline: normal range  Fetal HR Variability: moderate (amplitude range 6 to 25 bpm)  Fetal HR Accelerations: greater than/equal to 15 bpm, lasting at least 15 seconds  Fetal HR Decelerations: absent  Sinusoidal Pattern Present: absent  Fetal HR Tracing Category: Category I    BP 90/58   Pulse 102   LMP 2023   SpO2 100%     Reason for test: Other (Comment) (Hx Covid)  Date of Test: 2024  Time frame of test: 4159-3416  RN NST Interpretation:  Cat 1      Jillian Crisostomo RN  2024  12:09 EDT

## 2024-05-18 LAB — BACTERIA SPEC AEROBE CULT: NORMAL

## 2024-05-18 NOTE — NON STRESS TEST
Obstetrical Non-stress Test Interpretation     Name:  Ana Paula Amanda  MRN: [id number]    23 y.o. female  at 37w0d    Indication: EGA 37W; HX OF Covid       Baseline: Normal 110-160 bpm  Variability:   Moderate/Normal (amplitude 6-25 bpm)  Accelerations: Present (32 weeks+) 15 x 15 bpm  Decelerations: Absent   Contractions:  rare      Impression:  Category I       Danica Hernandez MD  2024  21:21 EDT

## 2024-05-23 ENCOUNTER — ROUTINE PRENATAL (OUTPATIENT)
Dept: OBSTETRICS AND GYNECOLOGY | Facility: CLINIC | Age: 24
End: 2024-05-23
Payer: COMMERCIAL

## 2024-05-23 VITALS — WEIGHT: 168.4 LBS | SYSTOLIC BLOOD PRESSURE: 112 MMHG | BODY MASS INDEX: 27.18 KG/M2 | DIASTOLIC BLOOD PRESSURE: 73 MMHG

## 2024-05-23 DIAGNOSIS — Z34.80 SUPERVISION OF OTHER NORMAL PREGNANCY, ANTEPARTUM: Primary | ICD-10-CM

## 2024-05-23 LAB
GLUCOSE UR STRIP-MCNC: NEGATIVE MG/DL
PROT UR STRIP-MCNC: NEGATIVE MG/DL

## 2024-05-23 NOTE — PROGRESS NOTES
OB FOLLOW UP  Complaint   Chief Complaint   Patient presents with    Routine Prenatal Visit            Ana Paula Amanda is a 23 y.o.  37w6d patient being seen today for her obstetrical follow up visit. Patient denies decreased fetal movement, contractions, loss of fluid or vaginal bleeding.  No acute complaints    Her prenatal care is complicated by (and status) :    Patient Active Problem List   Diagnosis    Irritable bowel syndrome with both constipation and diarrhea    Anxiety and depression    Chronic hypertension affecting pregnancy    Supervision of other normal pregnancy, antepartum    Round ligament pain    Excessive weight gain during pregnancy in third trimester    Slow transit constipation       All other systems reviewed and are negative.     The additional following portions of the patient's history were reviewed and updated as appropriate: allergies, current medications, past family history, past medical history, past social history, past surgical history, and problem list.      EXAM:     Vital signs: /73   Wt 76.4 kg (168 lb 6.4 oz)   LMP 2023   BMI 27.18 kg/m²   Appearance/psychiatric: To be in no distress  Constitutional: The patient is well nourished.  Cardiovascular: She does not have edema.  Respiratory: Respiratory effort is normal.  Gastrointestinal: Abdomen is soft, gravid, nontender, no rashes, heart tones are present, fundal height is size equals dates    Pelvic Exam: Yes.  Presentation: cephalic. Dilation: 2cm. Effacement: 60. Station: -3.    Urine glucose/protein: See prenatal flowsheet       Assessment and Plan    Problem List Items Addressed This Visit          Gravid and     Supervision of other normal pregnancy, antepartum - Primary    Overview     Initial visit:  PNL: 2023   PAP/GC/CT/Urine culture: NILM 2021; 2023   Blood type: O+  Hx of HSV?:  none    Genetic testing:    NIPS low risk XY;  CF previously performed, negative      Vaccinations:  COVID: Recommended  Influenza: Vaccinated    24-28 weeks:  1hr GCT: 2/19/2024 wnl  Hct/Plt: 2/19/2024 35/202  Tdap Rx: Recommended s/p Rx; done   ? Desires sterilization:  Declined    Third Trimester:  GBS  Breast pump:    Ultrasound:  Dating:  L = 7w5d   Anatomy:  1/18/2024 EFW (21) AC (31) normal anatomical study.  Vertex presentation anterior placenta grade 1.  Genitalia male.      Problem list/PLAN:  CHTN  Anxiety Depression         Relevant Orders    POC Urinalysis Dipstick (Completed)       Impression  Pregnancy at 37w6d  Fetal status reassuring.   Activity and Exercise discussed.    Plan  GBS negative  Induction previously scheduled for 6/5/2024  Return to office in 1 week  Continue with weekly nonstress test      Patient was counseled to the following pregnancy precautions:  Decreased fetal movement, if concern for decreased fetal movement please perform fetal kick counts you are looking for 10 movements in 2 hours.  If concern for fetal movement and not meeting that criteria, please present to triage for evaluation.  Contractions occurring every 5 minutes for over an hour, lasting 30 to 60 seconds and progressively causing more discomfort, please seek medical attention to rule out labor  If you believe that your water is broken, place a sanitary pad.  If pad fills in short period of time i.e. less than 5 minutes, take off pad placed another pad.  If this is saturated please present for rule out rupture of membranes  Vaginal bleeding can be normal in pregnancy, this usually takes a form of spotting.  If having heavier bleeding like a menstrual period please present for evaluation; especially in light of severe abdominal pain this could represent a placental abruption.  Keep all scheduled appointments as recommended.        Zeb Elise MD  05/23/2024

## 2024-05-24 ENCOUNTER — HOSPITAL ENCOUNTER (OUTPATIENT)
Facility: HOSPITAL | Age: 24
Discharge: HOME | End: 2024-05-24
Attending: STUDENT IN AN ORGANIZED HEALTH CARE EDUCATION/TRAINING PROGRAM | Admitting: STUDENT IN AN ORGANIZED HEALTH CARE EDUCATION/TRAINING PROGRAM
Payer: COMMERCIAL

## 2024-05-24 ENCOUNTER — HOSPITAL ENCOUNTER (OUTPATIENT)
Dept: LABOR AND DELIVERY | Facility: HOSPITAL | Age: 24
Discharge: HOME | End: 2024-05-24
Payer: COMMERCIAL

## 2024-05-24 VITALS
SYSTOLIC BLOOD PRESSURE: 112 MMHG | RESPIRATION RATE: 18 BRPM | DIASTOLIC BLOOD PRESSURE: 69 MMHG | HEART RATE: 101 BPM | OXYGEN SATURATION: 98 %

## 2024-05-24 PROCEDURE — 59025 FETAL NON-STRESS TEST: CPT | Performed by: STUDENT IN AN ORGANIZED HEALTH CARE EDUCATION/TRAINING PROGRAM

## 2024-05-24 PROCEDURE — 59025 FETAL NON-STRESS TEST: CPT

## 2024-05-24 NOTE — NON STRESS TEST
Obstetrical Non-stress Test Interpretation     Name:  Ana Paula Amanda  MRN: [id number]    23 y.o. female  at 38w0d    Indication: hx covid       Fetal Assessment  Fetal Movement: active  Fetal HR Assessment Method: external  Fetal HR (beats/min): 135  Fetal HR Baseline: normal range  Fetal HR Variability: moderate (amplitude range 6 to 25 bpm)  Fetal HR Accelerations: greater than/equal to 15 bpm, lasting at least 15 seconds  Fetal HR Decelerations: absent  Sinusoidal Pattern Present: absent    /69 (BP Location: Right arm)   Pulse 101   Resp 18   LMP 2023   SpO2 98%     Reason for test:  (hx covid)  Date of Test: 2024  Time frame of test: 5341-4134  RN NST Interpretation: Reactive      Meggan Geiger RN  2024  16:44 EDT

## 2024-05-28 NOTE — PROGRESS NOTES
"[facility]  Initial Evaluation    Referring Provider:  Mallory Willis, APRN  [address] [facility] [place] [address]    Chief Complaint: \"I've struggled with anxiety and depression for basically as long as I can remember... 2 years ago my brother  of an overdose... basically since then everything has gotten worse.\"    History of Present Illness: Ana Paula Amanda is a 23 y.o. female who presents today for initial evaluation regarding depressive and anxious symptoms. She presents unaccompanied in no acute distress and engages with me appropriately.     History is positive for signs/symptoms suggestive of MDD, GEOVANNI, panic disorder: low mood, low energy, anhedonia, changes in sleep, changes in appetite, guilt, poor concentration, psychomotor changes, consistent and excessive worry across several domains of life that contributes to tension and irritability throughout the day, recurrent episodes of intense, unprovoked anxiety with chest pain, feelings of doom, dizziness, paresthesias, SOB, and associated avoidance 2/2 fear of recurrence of panic. No hx SI. Brother was 19 YO when he passed from OD (); since this happened she's felt a bit of irrational fear regarding contamination of everyday objects with harmful chemicals (brother had unknowingly used fentanyl); she has good insight about this, but still feels very emotionally compelled by these thoughts/feelings. 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     I have discussed with patient that there are risks taking any medication during pregnancy and that these risks are poorly elaborated due to ethical concerns with studying medications in this cohort; I've advised risk may be beyond what " I'm able to advise today and that medication use should only be considered if potential benefits outweigh the possibility of risks by the patient's measure - discussed worst case scenarios of fetal termination and congenital defects that can lead to lifelong debility. Patient demonstrates capacity to consider risks versus benefits.    I have counseled the patient with regard to diagnoses and the recommended treatment regimen as documented below: I will assume prescriptive responsibility for I will be prescribing sertraline for MDD, GEOVANNI. Patient has been advised that SRIs may take up to 6-8 weeks for full effect and have a possibility of exacerbating mood/anxiety issues for which they are prescribed to the degree that, in some cases, their use may lead to acute suicidality. Patient contracts for safety appropriately. More common SE - sexual dysfunction, GI upset, tremors - were also reviewed. Patient was advised of potential for development of serotonin syndrome (as well as warning signs for onset) with concomitant use of multiple serotonergic agents and to be sure their health providers are aware this medication is being taken to mitigate this risk. Patient was advised that there is evidence to suggest use of SRIs in pregnancy have been associated with persistent pulmonary hypertension in newborns. Patient acknowledges the diagnoses per my rendered interpretation. Patient demonstrates awareness/understanding of viable alternatives for treatment as well as potential risks, benefits, and side effects associated with this regimen and is amenable to proceed in this fashion.     Recommended lifestyle changes: 30 minutes of activity to increase HR 2-3 days weekly.    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  Education: RN + NP  Pertinent developmental history: denies; denies abuse hx  Pertinent legal history: denies  Hobbies/interests: chayo, reading  Mandaen: \"in a gray space\"; raised [employer] (non-Congregation)  Exercise: denies  Dietary habits: defer  Sleep hygiene: defer  Social habits: no pertinent issues  Sunlight: no concern for under-exposure  Caffeine intake: no pertinent issues; 1 cup of coffee in the morning, no tea, occasional soda, rare energy drink  Hydration habits: no pertinent issues   history: denies    Social History     Socioeconomic History    Marital status:    Tobacco Use    Smoking status: Never     Passive exposure: Never    Smokeless tobacco: Never   Vaping Use    Vaping status: Never Used   Substance and Sexual Activity    Alcohol use: Not Currently     Comment: socially    Drug use: Never    Sexual activity: Yes     Partners: Male     Birth control/protection: None     Access to Firearms: yes; these are locked away.    Tobacco use counseling/intervention: N/A, patient does not use tobacco; patient was counseled with regard to risks of tobacco use.    PHQ-9 Depression Screening  PHQ-9 Total Score: 13    Little interest or pleasure in doing things? 2-->more than half the days   Feeling down, depressed, or hopeless? 2-->more than half the days   Trouble falling or staying asleep, or sleeping too much? 3-->nearly every day   Feeling tired or having little energy? 3-->nearly every day   Poor appetite or overeating? 0-->not at all   Feeling bad about yourself - or that you are a failure or have let yourself or your family down? 2-->more than half the days   Trouble " concentrating on things, such as reading the newspaper or watching television? 1-->several days   Moving or speaking so slowly that other people could have noticed? Or the opposite - being so fidgety or restless that you have been moving around a lot more than usual? 0-->not at all   Thoughts that you would be better off dead, or of hurting yourself in some way? 0-->not at all   PHQ-9 Total Score 13     GEOVANNI-7  Feeling nervous, anxious or on edge: Nearly every day  Not being able to stop or control worrying: Nearly every day  Worrying too much about different things: Nearly every day  Trouble Relaxing: Nearly every day  Being so restless that it is hard to sit still: Several days  Feeling afraid as if something awful might happen: Nearly every day  Becoming easily annoyed or irritable: Nearly every day  GEOVANNI 7 Total Score: 19  If you checked any problems, how difficult have these problems made it for you to do your work, take care of things at home, or get along with other people: Very difficult    Problem List:  Patient Active Problem List   Diagnosis    Irritable bowel syndrome with both constipation and diarrhea    Anxiety and depression    Chronic hypertension affecting pregnancy    Supervision of other normal pregnancy, antepartum    Round ligament pain    Excessive weight gain during pregnancy in third trimester    Slow transit constipation    GEOVANNI (generalized anxiety disorder)    Major depressive disorder, recurrent episode, moderate    Panic disorder     Allergy:   Allergies   Allergen Reactions    Lactose Intolerance (Gi) GI Intolerance      Discontinued Medications:  There are no discontinued medications.    Current Medications:   Current Outpatient Medications   Medication Sig Dispense Refill    aspirin 81 MG EC tablet Take 1 tablet by mouth Every Night. 30 tablet 11    doxylamine (UNISOM) 25 MG tablet Take 1 tablet by mouth At Night As Needed for Sleep or Nausea (or anxiety). May take and extra 1/2 tablet  PRN persistent nausea or anxiety 30 tablet 1    magnesium oxide (MAG-OX) 400 MG tablet Take 1 tablet by mouth Daily. 30 tablet 2    Prenatal Vit-Fe Fumarate-FA (Prenatal Vitamin) 27-0.8 MG tablet Take 1 tablet by mouth Daily. 30 tablet 11    sertraline (Zoloft) 50 MG tablet Take 0.5 tablets by mouth Every Night for 7 days, THEN 1 tablet Every Night for 23 days. 27 tablet 0     No current facility-administered medications for this visit.     Past Medical History:  Past Medical History:   Diagnosis Date    Anxiety     Chronic hypertension affecting pregnancy 11/21/2023    Depression     Heart valve disease     Noted on previous echo    Lactose intolerance     Orthostatic hypotension     POTS (postural orthostatic tachycardia syndrome)     Tachycardia      Past Surgical History:  Past Surgical History:   Procedure Laterality Date    MANDIBLE SURGERY  2020    Reconstructive Jaw Surgery     Family History:   Family History   Problem Relation Age of Onset    OCD Mother     Depression Mother     Anxiety disorder Mother     Heart disease Mother     Hyperlipidemia Mother     Depression Father     Hyperlipidemia Father     Hypertension Father     No Known Problems Sister     Drug abuse Brother     ADD / ADHD Brother     No Known Problems Maternal Aunt     Clotting disorder Paternal Aunt     No Known Problems Maternal Uncle     No Known Problems Paternal Uncle     Heart disease Maternal Grandfather     Melanoma Maternal Grandfather     Heart disease Maternal Grandmother     Lung cancer Maternal Grandmother     Stroke Paternal Grandfather     Heart disease Paternal Grandmother     Hypertension Paternal Grandmother     Clotting disorder Paternal Grandmother     Heart attack Paternal Grandmother     No Known Problems Cousin     No Known Problems Other     Breast cancer Neg Hx     Ovarian cancer Neg Hx     Uterine cancer Neg Hx     Colon cancer Neg Hx     Deep vein thrombosis Neg Hx     Pulmonary embolism Neg Hx     Alcohol abuse  Neg Hx     Bipolar disorder Neg Hx     Dementia Neg Hx     Paranoid behavior Neg Hx     Schizophrenia Neg Hx     Seizures Neg Hx     Self-Injurious Behavior  Neg Hx     Suicide Attempts Neg Hx      Mental Status Exam:   Appearance: well-groomed, sits upright, age-appropriate, gestational habitus  Behavior: calm, cooperative, appropriate in demeanor, appropriate eye-contact  Mood/affect: dysphoric / mood-congruent, but appropriate in both range and amplitude  Speech: within expected variance; appropriate rate, appropriate rhythm, appropriate tone; non-pressured  Thought Process: linear, goal-directed; no FOI or JOAN; abstraction intact  Thought Content: coherent, devoid of overt delusions/perceptual disturbances  SI/HI: denies both SI and HI; exhibits future-orientation, self-advocates appropriately, no regular self-harm, no appreciable intent  Memory: no overt deficits  Orientation: oriented to person/place/time/situation  Concentration: appropriate during interview  Intellectual capacity: presumptively average  Insight: fair by given history/exam  Judgment: appropriate by given history/exam  Psychomotor: no appreciable latency/retardation/agitation/tremor  Gait: WNL    Review of Systems:  Review of Systems   Constitutional:  Negative for activity change, appetite change and unexpected weight change.   HENT:  Negative for drooling.    Eyes:  Negative for visual disturbance.   Respiratory:  Negative for chest tightness and shortness of breath.    Cardiovascular:  Negative for chest pain and palpitations.   Gastrointestinal:  Negative for abdominal pain, diarrhea and nausea.   Endocrine: Negative for cold intolerance and heat intolerance.   Genitourinary:  Negative for difficulty urinating and frequency.   Musculoskeletal:  Negative for myalgias and neck stiffness.   Skin:  Negative for rash.   Neurological:  Negative for dizziness, tremors, seizures and light-headedness.      Vital Signs:   /81   Pulse 95   Ht  "167.6 cm (66\")   Wt 77.9 kg (171 lb 12.8 oz)   BMI 27.73 kg/m²      Lab Results:   Routine Prenatal on 05/23/2024   Component Date Value Ref Range Status    Glucose, UA 05/23/2024 Negative  Negative mg/dL Final    Protein, POC 05/23/2024 Negative  Negative mg/dL Final   Routine Prenatal on 05/15/2024   Component Date Value Ref Range Status    Glucose, UA 05/15/2024 Negative  Negative mg/dL Final    Protein, POC 05/15/2024 Negative  Negative mg/dL Final    Group B Strep Culture 05/15/2024 No Group B Streptococcus isolated   Final   Admission on 05/07/2024, Discharged on 05/07/2024   Component Date Value Ref Range Status    SARS Antigen 05/07/2024 Not Detected  Not Detected, Presumptive Negative Final    Influenza A Antigen DANNY 05/07/2024 Not Detected  Not Detected Final    Influenza B Antigen DANNY 05/07/2024 Not Detected  Not Detected Final    Internal Control 05/07/2024 Passed  Passed Final    Lot Number 05/07/2024 [id number]   Final    Expiration Date 05/07/2024 1/16/25   Final    Rapid Strep A Screen 05/07/2024 Negative   Final    Internal Control 05/07/2024 Passed   Final    Lot Number 05/07/2024 [id number]   Final    Expiration Date 05/07/2024 2/27/25   Final    COVID19 05/07/2024 Detected (C)  Not Detected - Ref. Range Final    Throat Culture, Beta Strep 05/07/2024 No Beta Hemolytic Streptococcus Isolated   Final   Routine Prenatal on 05/01/2024   Component Date Value Ref Range Status    Glucose, UA 05/01/2024 Negative  Negative mg/dL Final    Protein, POC 05/01/2024 Negative  Negative mg/dL Final   Routine Prenatal on 04/17/2024   Component Date Value Ref Range Status    Glucose, UA 04/17/2024 Negative  Negative mg/dL Final    Protein, POC 04/17/2024 Negative  Negative mg/dL Final   Routine Prenatal on 04/03/2024   Component Date Value Ref Range Status    Glucose, UA 04/03/2024 Negative  Negative mg/dL Final    Protein, POC 04/03/2024 Negative  Negative mg/dL Final   Routine Prenatal on 03/18/2024   Component " Date Value Ref Range Status    Glucose, UA 03/18/2024 Negative  Negative mg/dL Final    Protein, POC 03/18/2024 Negative  Negative mg/dL Final    Glucose, UA 03/18/2024 Negative  Negative mg/dL Final    Bilirubin 03/18/2024 Negative  Negative Final    Ketones, UA 03/18/2024 Negative  Negative Final    Specific Gravity  03/18/2024 1.020  1.005 - 1.030 Final    Blood, UA 03/18/2024 Negative  Negative Final    pH, Urine 03/18/2024 5.0  5.0 - 8.0 Final    Protein, POC 03/18/2024 Negative  Negative mg/dL Final    Urobilinogen, UA 03/18/2024 Normal  Normal, 0.2 E.U./dL Final    Leukocytes 03/18/2024 75 Ana/ul (A)  Negative Final    Nitrite, UA 03/18/2024 Negative  Negative Final    Urine Culture 03/18/2024 25,000 CFU/mL Normal Urogenital Velvet   Final   Lab Requisition on 03/15/2024   Component Date Value Ref Range Status    Hep B S Ab 03/15/2024 Reactive   Final    Mumps IgG 03/15/2024 28.2  Immune >10.9 AU/mL Final                                    Negative         <9.0                                  Equivocal  9.0 - 10.9                                  Positive        >10.9  A positive result generally indicates past exposure to  Mumps virus or previous vaccination.    Rubella Antibodies, IgG 03/15/2024 10.10  Immune >0.99 index Final                                    Non-immune       <0.90                                  Equivocal  0.90 - 0.99                                  Immune           >0.99    Rubeola IgG 03/15/2024 255.0  Immune >16.4 AU/mL Final                                     Negative        <13.5                                   Equivocal 13.5 - 16.4                                   Positive        >16.4  Presence of antibodies to Rubeola is presumptive evidence  of immunity except when acute infection is suspected.    Varicella IgG 03/15/2024 1118  Immune >165 index Final                                   Negative          <135                                 Equivocal    135 - 165                                  Positive          >165  A positive result generally indicates exposure to the  pathogen or administration of specific immunoglobulins,  but it is not indication of active infection or stage  of disease.    QuantiFERON Incubation 03/15/2024 Incubation performed.   Final    QUANTIFERON-TB GOLD PLUS 03/15/2024 Negative  Negative Final    No response to M tuberculosis antigens detected.  Infection with M tuberculosis is unlikely, but high risk  individuals should be considered for additional testing  (ATS/IDSA/CDC Clinical Practice Guidelines, 2017). The  reference range is an Antigen minus Nil result of <0.35 IU/mL.  Chemiluminescence immunoassay methodology    QuantiFERON Criteria 03/15/2024 Comment   Final    QuantiFERON-TB Gold Plus is a qualitative indirect test for  M tuberculosis infection (including disease) and is intended for use  in conjunction with risk assessment, radiography, and other medical  and diagnostic evaluations. The QuantiFERON-TB Gold Plus result is  determined by subtracting the Nil value from either TB antigen (Ag)  value. The Mitogen tube serves as a control for the test.    QUANTIFERON TB1 AG VALUE 03/15/2024 0.00  IU/mL Final    QUANTIFERON TB2 AG VALUE 03/15/2024 0.00  IU/mL Final    QuantiFERON Nil Value 03/15/2024 0.01  IU/mL Final    QuantiFERON Mitogen Value 03/15/2024 >10.00  IU/mL Final   Routine Prenatal on 02/19/2024   Component Date Value Ref Range Status    Glucose, UA 02/19/2024 Negative  Negative mg/dL Final    Protein, POC 02/19/2024 Negative  Negative mg/dL Final    WBC 02/19/2024 8.94  3.40 - 10.80 10*3/mm3 Final    RBC 02/19/2024 3.86  3.77 - 5.28 10*6/mm3 Final    Hemoglobin 02/19/2024 11.8 (L)  12.0 - 15.9 g/dL Final    Hematocrit 02/19/2024 35.0  34.0 - 46.6 % Final    MCV 02/19/2024 90.7  79.0 - 97.0 fL Final    MCH 02/19/2024 30.6  26.6 - 33.0 pg Final    MCHC 02/19/2024 33.7  31.5 - 35.7 g/dL Final    RDW 02/19/2024 11.8 (L)  12.3 - 15.4 % Final     RDW-SD 02/19/2024 39.1  37.0 - 54.0 fl Final    MPV 02/19/2024 12.4 (H)  6.0 - 12.0 fL Final    Platelets 02/19/2024 202  140 - 450 10*3/mm3 Final    Glucose Gestational Screen 02/19/2024 117  65 - 139 mg/dL Final   Routine Prenatal on 01/18/2024   Component Date Value Ref Range Status    Glucose, UA 01/18/2024 Negative  Negative mg/dL Final    Protein, POC 01/18/2024 Negative  Negative mg/dL Final   There may be more visits with results that are not included.     EKG Results:  No orders to display     Imaging Results:  No Images in the past 120 days found.    ASSESSMENT AND PLAN:    ICD-10-CM ICD-9-CM   1. GEOVANNI (generalized anxiety disorder)  F41.1 300.02   2. Major depressive disorder, recurrent episode, moderate  F33.1 296.32   3. Panic disorder  F41.0 300.01       23 y.o. female who presents today for initial evaluation regarding depressive and anxious symptoms. We have discussed the history and interpreted diagnoses as above as well as the treatment plan below, including potential R/B/SE of the recommended regimen of which the patient demonstrates understanding. Patient is agreeable to call 911 or go to the nearest ER should she become concerned for her own safety and/or the safety of those around her. There are no overt indices of acute jaye/psychosis on evaluation today.     Medication regimen: begin sertraline 25 mg HS with plan to titrate to 50 mg HS; patient is advised not to misuse prescribed medications or to use any exogenous substances that aren't disclosed to this provider as they may interact with the regimen to her detriment.   Risk Assessment: protracted risk is low, imminent risk is low.  Risk factors include: anxiety disorder, mood disorder, and recent/ongoing psychosocial stressors. Protective factors include: no known family history of suicidality, intact reality testing, no substance use disorder, no present SI, no stated history of suicide attempts or self-harm, patient's exhibited  future-orientation, strong social support, and patient's cooperation with care. Do note that this is subject to change with the Protestant of new stressors, treatment non-adherence, use of substances, and/or new medical ails.  Monitoring: reviewed labs as populated above; PHQ-9 today is 13/27, GEOVANNI-7 today is 19/21  Therapy: refer to Gianna Soler  Follow-up: 6 weeks  Communications: N/A    TREATMENT PLAN/GOALS: challenge patterns of living conducive to symptom burden, implement recommended regimen as above with augmentative, intermittent supportive psychotherapy to reduce symptom burden. Patient acknowledged and verbally consented to begin treatment as above. The importance of adherence to the recommended treatment and interval follow-up appointments was emphasized today. Patient was today advised to limit daily caffeine intake, hydrate appropriately, eat healthy and nutritious foods, engage sleep hygiene measures, engage appropriate exposure to sunlight, engage with hobbies in balance with life necessities, and exercise appropriate to their capacity to do so.     Billing: I have seen the patient today and considered her psychiatric complaints, rendered a diagnosis, and discussed treatment with the patient as above with which she consents.    Parts of this note are electronic transcriptions/translations of spoken language to printed text using the Dragon Dictation system.    Electronically signed by Silvano Odom MD, 05/28/24, [id number]

## 2024-05-28 NOTE — PROGRESS NOTES
Routine Prenatal Visit     Subjective  Ana Paula Amanda is a 23 y.o.  at 38w5d here for her routine OB visit.   She is taking her prenatal vitamins.Reports no loss of fluid or vaginal bleeding. Patient doing well without any complaints. Pregnancy complicated by:     Patient Active Problem List   Diagnosis    Irritable bowel syndrome with both constipation and diarrhea    Anxiety and depression    Chronic hypertension affecting pregnancy    Supervision of other normal pregnancy, antepartum    Round ligament pain    Excessive weight gain during pregnancy in third trimester    Slow transit constipation    GEOVANNI (generalized anxiety disorder)    Major depressive disorder, recurrent episode, moderate    Panic disorder         OB History    Para Term  AB Living   2 1 1 0 0 1   SAB IAB Ectopic Molar Multiple Live Births   0 0 0 0 0 1      # Outcome Date GA Lbr King/2nd Weight Sex Type Anes PTL Lv   2 Current            1 Term 22 39w6d 09:59 / 01:21 3825 g (8 lb 6.9 oz) F Vag-Spont EPI N ALIX           ROS:   General ROS: negative for - chills or fatigue  Genito-Urinary ROS: negative for  change in urinary stream, vaginal discharge     Objective  Physical Exam:   Vitals:    24 1000   BP: 110/75       Uterine Size: size equals dates  FHT: 110-160 BPM    General appearance - alert, well appearing, and in no distress  Abdomen- Soft, Gravid uterus, non-tender to palpation  Extremeties: negative swelling   SVE declined    Assessment/Plan:   Diagnoses and all orders for this visit:    1. Supervision of other normal pregnancy, antepartum (Primary)  Assessment & Plan:  Initial visit:  PNL: 2023   PAP/GC/CT/Urine culture: NILM 2021; 2023   Blood type: O+  Hx of HSV?:  none     Genetic testing:    NIPS low risk XY;  CF previously performed, negative      Vaccinations:  COVID: Recommended  Influenza: Vaccinated     24-28 weeks:  1hr GCT: 2024 wnl  Hct/Plt: 2024  Tdap Rx:  Recommended s/p Rx; done   ? Desires sterilization:  Declined     Third Trimester:  GBS  Breast pump:     Ultrasound:  Dating:  L = 7w5d   Anatomy:  1/18/2024 EFW (21) AC (31) normal anatomical study.  Vertex presentation anterior placenta grade 1.  Genitalia male.       Problem list/PLAN:  CHTN  Anxiety Depression- psych started on zoloft 05/29/24    Orders:  -     POC Urinalysis Dipstick    2. Anxiety and depression    3. Chronic hypertension affecting pregnancy            Counseling:   OB precautions, leaking, VB, isidro wilkins vs PTL/Labor  FKC  IOL scheduled  Round Ligament Pain:  The uterus has several ligaments which provide support and keep the uterus in place. As the  uterus grows these ligaments are pulled and stretched which often causes sharp stabbing like pain in the inguinal area.   You may find a pregnancy support band helpful. Changing positions may also help. Yoga is a great way to cope with round ligament and low back pain in pregnancy.    Massage may also help with low back pain   Things to Consider at this Point in your Pregnancy:  Some women experience swelling in their feet during pregnancy. Compression stockings may help  Drink plenty of water and stay active   Make sure you are eating frequent small meals, nuts are a wonderful snack to keep with you            Return in about 6 weeks (around 7/10/2024) for Postpartum.      We have gone over prenatal care to include the timing and content of visits. I informed her how to contact the office and/or on call person in the event of any problems and encouraged her to do so when she feels it is necessary.  We then spent time answering her questions which she indicated were answered to her satisfaction.    Anais Verdugo DO  5/29/2024 10:10 EDT

## 2024-05-29 ENCOUNTER — ROUTINE PRENATAL (OUTPATIENT)
Dept: OBSTETRICS AND GYNECOLOGY | Facility: CLINIC | Age: 24
End: 2024-05-29
Payer: COMMERCIAL

## 2024-05-29 ENCOUNTER — OFFICE VISIT (OUTPATIENT)
Dept: PSYCHIATRY | Facility: CLINIC | Age: 24
End: 2024-05-29
Payer: COMMERCIAL

## 2024-05-29 VITALS — BODY MASS INDEX: 27.6 KG/M2 | DIASTOLIC BLOOD PRESSURE: 75 MMHG | SYSTOLIC BLOOD PRESSURE: 110 MMHG | WEIGHT: 171 LBS

## 2024-05-29 VITALS
HEART RATE: 95 BPM | HEIGHT: 66 IN | BODY MASS INDEX: 27.61 KG/M2 | WEIGHT: 171.8 LBS | SYSTOLIC BLOOD PRESSURE: 120 MMHG | DIASTOLIC BLOOD PRESSURE: 81 MMHG

## 2024-05-29 DIAGNOSIS — F41.0 PANIC DISORDER: ICD-10-CM

## 2024-05-29 DIAGNOSIS — F32.A ANXIETY AND DEPRESSION: ICD-10-CM

## 2024-05-29 DIAGNOSIS — F41.9 ANXIETY AND DEPRESSION: ICD-10-CM

## 2024-05-29 DIAGNOSIS — F33.1 MAJOR DEPRESSIVE DISORDER, RECURRENT EPISODE, MODERATE: ICD-10-CM

## 2024-05-29 DIAGNOSIS — F41.1 GAD (GENERALIZED ANXIETY DISORDER): Primary | ICD-10-CM

## 2024-05-29 DIAGNOSIS — O10.919 CHRONIC HYPERTENSION AFFECTING PREGNANCY: ICD-10-CM

## 2024-05-29 DIAGNOSIS — Z34.80 SUPERVISION OF OTHER NORMAL PREGNANCY, ANTEPARTUM: Primary | ICD-10-CM

## 2024-05-29 LAB
GLUCOSE UR STRIP-MCNC: NEGATIVE MG/DL
PROT UR STRIP-MCNC: ABNORMAL MG/DL

## 2024-05-29 NOTE — TREATMENT PLAN
Multi-Disciplinary Problems (from Behavioral Health Treatment Plan)      Active Problems       Problem: Anxiety  Start Date: 05/29/24      Problem Details: The patient self-scales this problem as a 9 with 10 being the worst.          Goal Priority Start Date Expected End Date End Date    Patient will develop and implement behavioral and cognitive strategies to reduce anxiety and irrational fears. -- 05/29/24 -- --    Goal Details: Progress toward goal:  Not appropriate to rate progress toward goal since this is the initial treatment plan.          Goal Intervention Frequency Start Date End Date    Help patient explore past emotional issues in relation to present anxiety. PRN 05/29/24 --    Intervention Details: Duration of treatment until until remission of symptoms.          Goal Intervention Frequency Start Date End Date    Help patient develop an awareness of their cognitive and physical responses to anxiety. PRN 05/29/24 --    Intervention Details: Duration of treatment until until remission of symptoms.                  Problem: Depression  Start Date: 05/29/24      Problem Details: The patient self-scales this problem as a 5 with 10 being the worst.          Goal Priority Start Date Expected End Date End Date    Patient will demonstrate the ability to initiate new constructive life skills outside of sessions on a consistent basis. -- 05/29/24 -- --    Goal Details: Progress toward goal:  Not appropriate to rate progress toward goal since this is the initial treatment plan.          Goal Intervention Frequency Start Date End Date    Assist patient in setting attainable activities of daily living goals. PRN 05/29/24 --      Goal Intervention Frequency Start Date End Date    Provide education about depression PRN 05/29/24 --    Intervention Details: Duration of treatment until until remission of symptoms.          Goal Intervention Frequency Start Date End Date    Assist patient in developing healthy coping  strategies. PRN 05/29/24 --    Intervention Details: Duration of treatment until until remission of symptoms.                  Problem: Panic Disorder  Start Date: 05/29/24      Problem Details: The patient self-scales this problem as a 9 with 10 being the worst.            Goal Priority Start Date Expected End Date End Date    Reduce fear of the specific stimulus object or situation that previously provoked immediate anxiety. -- 05/29/24 -- --    Goal Details: Progress toward goal:  Not appropriate to rate progress toward goal since this is the initial treatment plan.        Goal Intervention Frequency Start Date End Date    Develop positive, healthy, and rational self talk that reduces fear and allows the behavioral encounter with avoided stimuli. PRN 05/29/24 --    Intervention Details: Duration of treatment until until remission of symptoms.                             I have discussed and reviewed this treatment plan with the patient.

## 2024-05-29 NOTE — ASSESSMENT & PLAN NOTE
Initial visit:  PNL: 11/21/2023   PAP/GC/CT/Urine culture: NILM 11/2021; 11/21/2023   Blood type: O+  Hx of HSV?:  none     Genetic testing:    NIPS low risk XY;  CF previously performed, negative      Vaccinations:  COVID: Recommended  Influenza: Vaccinated     24-28 weeks:  1hr GCT: 2/19/2024 wnl  Hct/Plt: 2/19/2024 35/202  Tdap Rx: Recommended s/p Rx; done   ? Desires sterilization:  Declined     Third Trimester:  GBS  Breast pump:     Ultrasound:  Dating:  L = 7w5d   Anatomy:  1/18/2024 EFW (21) AC (31) normal anatomical study.  Vertex presentation anterior placenta grade 1.  Genitalia male.       Problem list/PLAN:  CHTN  Anxiety Depression- psych started on zoloft 05/29/24

## 2024-05-31 ENCOUNTER — PATIENT ROUNDING (BHMG ONLY) (OUTPATIENT)
Dept: PSYCHIATRY | Facility: CLINIC | Age: 24
End: 2024-05-31
Payer: COMMERCIAL

## 2024-05-31 ENCOUNTER — HOSPITAL ENCOUNTER (OUTPATIENT)
Dept: LABOR AND DELIVERY | Facility: HOSPITAL | Age: 24
Discharge: HOME | End: 2024-05-31
Payer: COMMERCIAL

## 2024-05-31 ENCOUNTER — PATIENT ROUNDING (BHMG ONLY) (OUTPATIENT)
Dept: PSYCHIATRY | Facility: CLINIC | Age: 24
End: 2024-05-31

## 2024-05-31 ENCOUNTER — HOSPITAL ENCOUNTER (OUTPATIENT)
Facility: HOSPITAL | Age: 24
Discharge: HOME | End: 2024-05-31
Attending: OBSTETRICS & GYNECOLOGY | Admitting: OBSTETRICS & GYNECOLOGY
Payer: COMMERCIAL

## 2024-05-31 VITALS — DIASTOLIC BLOOD PRESSURE: 73 MMHG | RESPIRATION RATE: 16 BRPM | SYSTOLIC BLOOD PRESSURE: 110 MMHG | HEART RATE: 96 BPM

## 2024-05-31 PROCEDURE — 59025 FETAL NON-STRESS TEST: CPT

## 2024-05-31 NOTE — PROGRESS NOTES
ATTEMPTED TO CONTACT PT(PATIENT)      NO ANSWER      LEFT VOICEMAIL WITH INSTRUCTIONS TO RETURN CALL TO OFFICE AT [phone]

## 2024-05-31 NOTE — NON STRESS TEST
Obstetrical Non-stress Test Interpretation     Name:  Ana Paula Amanda  MRN: [id number]    23 y.o. female  at 39w0d    Indication: Covid in pregnancy       Fetal Movement: active  Fetal HR Assessment Method: external  Fetal HR (beats/min): 125  Fetal HR Baseline: normal range  Fetal HR Variability: moderate (amplitude range 6 to 25 bpm)  Fetal HR Accelerations: greater than/equal to 15 bpm, lasting at least 15 seconds  Fetal HR Decelerations: absent  Sinusoidal Pattern Present: absent    /73 (BP Location: Right arm, Patient Position: Sitting)   Pulse 96   Resp 16   LMP 2023     Reason for test: Other (Comment) (covid in pregnancy)  Date of Test: 2024  Time frame of test: 8280-5845  RN NST Interpretation: Reactive      Lilly Bauman RN  2024  15:29 EDT

## 2024-06-02 ENCOUNTER — ANESTHESIA (OUTPATIENT)
Dept: LABOR AND DELIVERY | Facility: HOSPITAL | Age: 24
End: 2024-06-02
Payer: COMMERCIAL

## 2024-06-02 ENCOUNTER — ANESTHESIA EVENT (OUTPATIENT)
Dept: LABOR AND DELIVERY | Facility: HOSPITAL | Age: 24
End: 2024-06-02
Payer: COMMERCIAL

## 2024-06-02 ENCOUNTER — HOSPITAL ENCOUNTER (INPATIENT)
Facility: HOSPITAL | Age: 24
LOS: 1 days | Discharge: HOME | End: 2024-06-03
Attending: OBSTETRICS & GYNECOLOGY | Admitting: OBSTETRICS & GYNECOLOGY
Payer: COMMERCIAL

## 2024-06-02 PROBLEM — N94.9 ROUND LIGAMENT PAIN: Status: RESOLVED | Noted: 2024-04-17 | Resolved: 2024-06-02

## 2024-06-02 PROBLEM — O26.86 PUPPP (PRURITIC URTICARIAL PAPULES AND PLAQUES OF PREGNANCY): Status: ACTIVE | Noted: 2024-06-02

## 2024-06-02 PROBLEM — Z34.80 SUPERVISION OF OTHER NORMAL PREGNANCY, ANTEPARTUM: Status: RESOLVED | Noted: 2023-11-21 | Resolved: 2024-06-02

## 2024-06-02 PROBLEM — O26.03 EXCESSIVE WEIGHT GAIN DURING PREGNANCY IN THIRD TRIMESTER: Status: RESOLVED | Noted: 2024-04-17 | Resolved: 2024-06-02

## 2024-06-02 LAB
ABO GROUP BLD: NORMAL
ALBUMIN SERPL-MCNC: 3.7 G/DL (ref 3.5–5.2)
ALBUMIN/GLOB SERPL: 1.3 G/DL
ALP SERPL-CCNC: 194 U/L (ref 39–117)
ALT SERPL W P-5'-P-CCNC: 8 U/L (ref 1–33)
AMPHET+METHAMPHET UR QL: NEGATIVE
ANION GAP SERPL CALCULATED.3IONS-SCNC: 12.7 MMOL/L (ref 5–15)
AST SERPL-CCNC: 14 U/L (ref 1–32)
BARBITURATES UR QL SCN: NEGATIVE
BASE EXCESS BLDCOA CALC-SCNC: -3.9 MMOL/L (ref -2–2)
BASE EXCESS BLDCOV CALC-SCNC: -2.5 MMOL/L (ref -2–2)
BENZODIAZ UR QL SCN: NEGATIVE
BILE AC SERPL-SCNC: 6 UMOL/L (ref 0–10)
BILIRUB BLD-MCNC: NEGATIVE MG/DL
BILIRUB SERPL-MCNC: 0.3 MG/DL (ref 0–1.2)
BLD GP AB SCN SERPL QL: NEGATIVE
BUN SERPL-MCNC: 9 MG/DL (ref 6–20)
BUN/CREAT SERPL: 15.5 (ref 7–25)
CALCIUM SPEC-SCNC: 8.7 MG/DL (ref 8.6–10.5)
CANNABINOIDS SERPL QL: NEGATIVE
CHLORIDE SERPL-SCNC: 105 MMOL/L (ref 98–107)
CLARITY, POC: CLEAR
CO2 SERPL-SCNC: 20.3 MMOL/L (ref 22–29)
COCAINE UR QL: NEGATIVE
COLOR UR: YELLOW
CREAT SERPL-MCNC: 0.58 MG/DL (ref 0.57–1)
CREAT UR-MCNC: 67.6 MG/DL
DEPRECATED RDW RBC AUTO: 40.8 FL (ref 37–54)
EGFRCR SERPLBLD CKD-EPI 2021: 129 ML/MIN/1.73
ERYTHROCYTE [DISTWIDTH] IN BLOOD BY AUTOMATED COUNT: 14.4 % (ref 12.3–15.4)
FENTANYL UR-MCNC: NEGATIVE NG/ML
GLOBULIN UR ELPH-MCNC: 2.8 GM/DL
GLUCOSE SERPL-MCNC: 80 MG/DL (ref 65–99)
GLUCOSE UR STRIP-MCNC: NEGATIVE MG/DL
HCO3 BLDCOA-SCNC: 21.2 MMOL/L
HCO3 BLDCOV-SCNC: 20.9 MMOL/L
HCT VFR BLD AUTO: 34.2 % (ref 34–46.6)
HGB BLD-MCNC: 10.7 G/DL (ref 12–15.9)
KETONES UR QL: NEGATIVE
LEUKOCYTE EST, POC: ABNORMAL
MCH RBC QN AUTO: 24.7 PG (ref 26.6–33)
MCHC RBC AUTO-ENTMCNC: 31.3 G/DL (ref 31.5–35.7)
MCV RBC AUTO: 79 FL (ref 79–97)
METHADONE UR QL SCN: NEGATIVE
NITRITE UR-MCNC: NEGATIVE MG/ML
OPIATES UR QL: NEGATIVE
OXYCODONE UR QL SCN: NEGATIVE
PCO2 BLDCOA: 39 MMHG (ref 33–49)
PCO2 BLDCOV: 32.6 MM HG (ref 28–40)
PH BLDCOA: 7.35 PH UNITS (ref 7.21–7.31)
PH BLDCOV: 7.42 PH UNITS (ref 7.31–7.37)
PH UR: 6.5 [PH] (ref 5–8)
PLATELET # BLD AUTO: 210 10*3/MM3 (ref 140–450)
PMV BLD AUTO: ABNORMAL FL
PO2 BLDCOA: <40.5 MMHG
PO2 BLDCOV: <40.5 MM HG (ref 21–31)
POTASSIUM SERPL-SCNC: 3.9 MMOL/L (ref 3.5–5.2)
PROT ?TM UR-MCNC: 17 MG/DL
PROT SERPL-MCNC: 6.5 G/DL (ref 6–8.5)
PROT UR STRIP-MCNC: NEGATIVE MG/DL
PROT/CREAT UR: 0.25 MG/G{CREAT}
RBC # BLD AUTO: 4.33 10*6/MM3 (ref 3.77–5.28)
RBC # UR STRIP: NEGATIVE /UL
RH BLD: POSITIVE
SODIUM SERPL-SCNC: 138 MMOL/L (ref 136–145)
SP GR UR: 1.01 (ref 1–1.03)
T PALLIDUM IGG SER QL: NORMAL
T&S EXPIRATION DATE: NORMAL
UROBILINOGEN UR QL: NORMAL
WBC NRBC COR # BLD AUTO: 9.21 10*3/MM3 (ref 3.4–10.8)

## 2024-06-02 PROCEDURE — 80307 DRUG TEST PRSMV CHEM ANLYZR: CPT | Performed by: OBSTETRICS & GYNECOLOGY

## 2024-06-02 PROCEDURE — 86901 BLOOD TYPING SEROLOGIC RH(D): CPT | Performed by: OBSTETRICS & GYNECOLOGY

## 2024-06-02 PROCEDURE — 85027 COMPLETE CBC AUTOMATED: CPT | Performed by: OBSTETRICS & GYNECOLOGY

## 2024-06-02 PROCEDURE — 25010000002 FENTANYL CITRATE (PF) 50 MCG/ML SOLUTION: Performed by: REGISTERED NURSE

## 2024-06-02 PROCEDURE — 86850 RBC ANTIBODY SCREEN: CPT | Performed by: OBSTETRICS & GYNECOLOGY

## 2024-06-02 PROCEDURE — 86780 TREPONEMA PALLIDUM: CPT | Performed by: OBSTETRICS & GYNECOLOGY

## 2024-06-02 PROCEDURE — 10907ZC DRAINAGE OF AMNIOTIC FLUID, THERAPEUTIC FROM PRODUCTS OF CONCEPTION, VIA NATURAL OR ARTIFICIAL OPENING: ICD-10-PCS | Performed by: OBSTETRICS & GYNECOLOGY

## 2024-06-02 PROCEDURE — 51702 INSERT TEMP BLADDER CATH: CPT

## 2024-06-02 PROCEDURE — C1755 CATHETER, INTRASPINAL: HCPCS | Performed by: REGISTERED NURSE

## 2024-06-02 PROCEDURE — 82239 BILE ACIDS TOTAL: CPT | Performed by: OBSTETRICS & GYNECOLOGY

## 2024-06-02 PROCEDURE — 82803 BLOOD GASES ANY COMBINATION: CPT | Performed by: OBSTETRICS & GYNECOLOGY

## 2024-06-02 PROCEDURE — 25810000003 LACTATED RINGERS PER 1000 ML: Performed by: OBSTETRICS & GYNECOLOGY

## 2024-06-02 PROCEDURE — 82570 ASSAY OF URINE CREATININE: CPT | Performed by: OBSTETRICS & GYNECOLOGY

## 2024-06-02 PROCEDURE — 81002 URINALYSIS NONAUTO W/O SCOPE: CPT | Performed by: OBSTETRICS & GYNECOLOGY

## 2024-06-02 PROCEDURE — 86900 BLOOD TYPING SEROLOGIC ABO: CPT | Performed by: OBSTETRICS & GYNECOLOGY

## 2024-06-02 PROCEDURE — 80053 COMPREHEN METABOLIC PANEL: CPT | Performed by: OBSTETRICS & GYNECOLOGY

## 2024-06-02 PROCEDURE — 84156 ASSAY OF PROTEIN URINE: CPT | Performed by: OBSTETRICS & GYNECOLOGY

## 2024-06-02 PROCEDURE — 59025 FETAL NON-STRESS TEST: CPT

## 2024-06-02 RX ORDER — ACETAMINOPHEN 325 MG/1
650 TABLET ORAL EVERY 6 HOURS PRN
Status: DISCONTINUED | OUTPATIENT
Start: 2024-06-02 | End: 2024-06-03 | Stop reason: HOSPADM

## 2024-06-02 RX ORDER — SODIUM CHLORIDE, SODIUM LACTATE, POTASSIUM CHLORIDE, CALCIUM CHLORIDE 600; 310; 30; 20 MG/100ML; MG/100ML; MG/100ML; MG/100ML
125 INJECTION, SOLUTION INTRAVENOUS CONTINUOUS
Status: DISCONTINUED | OUTPATIENT
Start: 2024-06-02 | End: 2024-06-02

## 2024-06-02 RX ORDER — MAGNESIUM CARB/ALUMINUM HYDROX 105-160MG
30 TABLET,CHEWABLE ORAL ONCE
Status: DISCONTINUED | OUTPATIENT
Start: 2024-06-02 | End: 2024-06-02 | Stop reason: HOSPADM

## 2024-06-02 RX ORDER — ONDANSETRON 4 MG/1
4 TABLET, ORALLY DISINTEGRATING ORAL EVERY 6 HOURS PRN
Status: DISCONTINUED | OUTPATIENT
Start: 2024-06-02 | End: 2024-06-02 | Stop reason: HOSPADM

## 2024-06-02 RX ORDER — CALCIUM CARBONATE 500 MG/1
1 TABLET, CHEWABLE ORAL 3 TIMES DAILY PRN
Status: DISCONTINUED | OUTPATIENT
Start: 2024-06-02 | End: 2024-06-03 | Stop reason: HOSPADM

## 2024-06-02 RX ORDER — SODIUM CHLORIDE 0.9 % (FLUSH) 0.9 %
10 SYRINGE (ML) INJECTION AS NEEDED
Status: DISCONTINUED | OUTPATIENT
Start: 2024-06-02 | End: 2024-06-02 | Stop reason: HOSPADM

## 2024-06-02 RX ORDER — IBUPROFEN 600 MG/1
600 TABLET ORAL EVERY 6 HOURS PRN
Status: DISCONTINUED | OUTPATIENT
Start: 2024-06-02 | End: 2024-06-03 | Stop reason: HOSPADM

## 2024-06-02 RX ORDER — ACETAMINOPHEN 325 MG/1
650 TABLET ORAL EVERY 4 HOURS PRN
Status: DISCONTINUED | OUTPATIENT
Start: 2024-06-02 | End: 2024-06-02 | Stop reason: HOSPADM

## 2024-06-02 RX ORDER — OXYTOCIN/0.9 % SODIUM CHLORIDE 30/500 ML
125 PLASTIC BAG, INJECTION (ML) INTRAVENOUS ONCE AS NEEDED
Status: DISCONTINUED | OUTPATIENT
Start: 2024-06-02 | End: 2024-06-02

## 2024-06-02 RX ORDER — LIDOCAINE HYDROCHLORIDE 10 MG/ML
0.5 INJECTION, SOLUTION EPIDURAL; INFILTRATION; INTRACAUDAL; PERINEURAL ONCE AS NEEDED
Status: DISCONTINUED | OUTPATIENT
Start: 2024-06-02 | End: 2024-06-02 | Stop reason: HOSPADM

## 2024-06-02 RX ORDER — OXYTOCIN/0.9 % SODIUM CHLORIDE 30/500 ML
250 PLASTIC BAG, INJECTION (ML) INTRAVENOUS CONTINUOUS
Status: DISCONTINUED | OUTPATIENT
Start: 2024-06-02 | End: 2024-06-02

## 2024-06-02 RX ORDER — LIDOCAINE HYDROCHLORIDE 20 MG/ML
INJECTION, SOLUTION EPIDURAL; INFILTRATION; INTRACAUDAL; PERINEURAL
Status: COMPLETED | OUTPATIENT
Start: 2024-06-02 | End: 2024-06-02

## 2024-06-02 RX ORDER — OXYTOCIN/0.9 % SODIUM CHLORIDE 30/500 ML
1 PLASTIC BAG, INJECTION (ML) INTRAVENOUS
Status: DISCONTINUED | OUTPATIENT
Start: 2024-06-02 | End: 2024-06-02

## 2024-06-02 RX ORDER — LIDOCAINE HYDROCHLORIDE AND EPINEPHRINE 15; 5 MG/ML; UG/ML
INJECTION, SOLUTION EPIDURAL
Status: COMPLETED | OUTPATIENT
Start: 2024-06-02 | End: 2024-06-02

## 2024-06-02 RX ORDER — MORPHINE SULFATE 2 MG/ML
1 INJECTION, SOLUTION INTRAMUSCULAR; INTRAVENOUS EVERY 4 HOURS PRN
Status: DISCONTINUED | OUTPATIENT
Start: 2024-06-02 | End: 2024-06-02 | Stop reason: HOSPADM

## 2024-06-02 RX ORDER — ONDANSETRON 2 MG/ML
4 INJECTION INTRAMUSCULAR; INTRAVENOUS EVERY 6 HOURS PRN
Status: DISCONTINUED | OUTPATIENT
Start: 2024-06-02 | End: 2024-06-02 | Stop reason: SDUPTHER

## 2024-06-02 RX ORDER — DOCUSATE SODIUM 100 MG/1
100 CAPSULE, LIQUID FILLED ORAL DAILY
Status: DISCONTINUED | OUTPATIENT
Start: 2024-06-02 | End: 2024-06-03 | Stop reason: HOSPADM

## 2024-06-02 RX ORDER — HYDROXYZINE PAMOATE 50 MG/1
50 CAPSULE ORAL ONCE
Status: COMPLETED | OUTPATIENT
Start: 2024-06-02 | End: 2024-06-02

## 2024-06-02 RX ORDER — PRENATAL VIT/IRON FUM/FOLIC AC 27MG-0.8MG
1 TABLET ORAL DAILY
Status: DISCONTINUED | OUTPATIENT
Start: 2024-06-03 | End: 2024-06-03 | Stop reason: HOSPADM

## 2024-06-02 RX ORDER — FAMOTIDINE 20 MG/1
20 TABLET, FILM COATED ORAL ONCE AS NEEDED
Status: DISCONTINUED | OUTPATIENT
Start: 2024-06-02 | End: 2024-06-02 | Stop reason: HOSPADM

## 2024-06-02 RX ORDER — FERROUS SULFATE 325(65) MG
325 TABLET ORAL 2 TIMES DAILY WITH MEALS
Status: DISCONTINUED | OUTPATIENT
Start: 2024-06-02 | End: 2024-06-03 | Stop reason: HOSPADM

## 2024-06-02 RX ORDER — ONDANSETRON 2 MG/ML
4 INJECTION INTRAMUSCULAR; INTRAVENOUS EVERY 6 HOURS PRN
Status: DISCONTINUED | OUTPATIENT
Start: 2024-06-02 | End: 2024-06-02 | Stop reason: HOSPADM

## 2024-06-02 RX ORDER — LIDOCAINE HYDROCHLORIDE 20 MG/ML
INJECTION, SOLUTION EPIDURAL; INFILTRATION; INTRACAUDAL; PERINEURAL
Status: COMPLETED
Start: 2024-06-02 | End: 2024-06-02

## 2024-06-02 RX ORDER — TERBUTALINE SULFATE 1 MG/ML
0.25 INJECTION, SOLUTION SUBCUTANEOUS AS NEEDED
Status: DISCONTINUED | OUTPATIENT
Start: 2024-06-02 | End: 2024-06-02 | Stop reason: HOSPADM

## 2024-06-02 RX ORDER — NALOXONE HCL 0.4 MG/ML
0.4 VIAL (ML) INJECTION
Status: DISCONTINUED | OUTPATIENT
Start: 2024-06-02 | End: 2024-06-02 | Stop reason: HOSPADM

## 2024-06-02 RX ORDER — ACETAMINOPHEN 325 MG/1
650 TABLET ORAL EVERY 6 HOURS
Status: DISCONTINUED | OUTPATIENT
Start: 2024-06-02 | End: 2024-06-02 | Stop reason: HOSPADM

## 2024-06-02 RX ORDER — OXYTOCIN/0.9 % SODIUM CHLORIDE 30/500 ML
999 PLASTIC BAG, INJECTION (ML) INTRAVENOUS ONCE
Status: COMPLETED | OUTPATIENT
Start: 2024-06-02 | End: 2024-06-02

## 2024-06-02 RX ORDER — FENTANYL CITRATE 50 UG/ML
INJECTION, SOLUTION INTRAMUSCULAR; INTRAVENOUS
Status: COMPLETED
Start: 2024-06-02 | End: 2024-06-02

## 2024-06-02 RX ORDER — SODIUM CHLORIDE 9 MG/ML
40 INJECTION, SOLUTION INTRAVENOUS AS NEEDED
Status: DISCONTINUED | OUTPATIENT
Start: 2024-06-02 | End: 2024-06-02 | Stop reason: HOSPADM

## 2024-06-02 RX ORDER — EPHEDRINE SULFATE 50 MG/ML
5 INJECTION, SOLUTION INTRAVENOUS
Status: DISCONTINUED | OUTPATIENT
Start: 2024-06-02 | End: 2024-06-02 | Stop reason: HOSPADM

## 2024-06-02 RX ORDER — ONDANSETRON 4 MG/1
4 TABLET, ORALLY DISINTEGRATING ORAL EVERY 8 HOURS PRN
Status: DISCONTINUED | OUTPATIENT
Start: 2024-06-02 | End: 2024-06-03 | Stop reason: HOSPADM

## 2024-06-02 RX ORDER — HYDROCODONE BITARTRATE AND ACETAMINOPHEN 5; 325 MG/1; MG/1
1 TABLET ORAL EVERY 4 HOURS PRN
Status: DISCONTINUED | OUTPATIENT
Start: 2024-06-02 | End: 2024-06-03 | Stop reason: HOSPADM

## 2024-06-02 RX ORDER — FENTANYL/ROPIVACAINE/NS/PF 2MCG/ML-.2
PLASTIC BAG, INJECTION (ML) INJECTION
Status: COMPLETED
Start: 2024-06-02 | End: 2024-06-02

## 2024-06-02 RX ORDER — FAMOTIDINE 10 MG/ML
20 INJECTION, SOLUTION INTRAVENOUS ONCE AS NEEDED
Status: DISCONTINUED | OUTPATIENT
Start: 2024-06-02 | End: 2024-06-02 | Stop reason: HOSPADM

## 2024-06-02 RX ORDER — MISOPROSTOL 200 UG/1
600 TABLET ORAL ONCE
Status: DISCONTINUED | OUTPATIENT
Start: 2024-06-02 | End: 2024-06-02 | Stop reason: HOSPADM

## 2024-06-02 RX ORDER — FENTANYL CITRATE 50 UG/ML
INJECTION, SOLUTION INTRAMUSCULAR; INTRAVENOUS
Status: COMPLETED | OUTPATIENT
Start: 2024-06-02 | End: 2024-06-02

## 2024-06-02 RX ORDER — IBUPROFEN 600 MG/1
600 TABLET ORAL EVERY 6 HOURS
Status: DISCONTINUED | OUTPATIENT
Start: 2024-06-02 | End: 2024-06-02 | Stop reason: HOSPADM

## 2024-06-02 RX ORDER — HYDROCODONE BITARTRATE AND ACETAMINOPHEN 10; 325 MG/1; MG/1
1 TABLET ORAL EVERY 4 HOURS PRN
Status: DISCONTINUED | OUTPATIENT
Start: 2024-06-02 | End: 2024-06-03 | Stop reason: HOSPADM

## 2024-06-02 RX ORDER — ONDANSETRON 4 MG/1
4 TABLET, ORALLY DISINTEGRATING ORAL EVERY 6 HOURS PRN
Status: DISCONTINUED | OUTPATIENT
Start: 2024-06-02 | End: 2024-06-02 | Stop reason: SDUPTHER

## 2024-06-02 RX ORDER — SODIUM CHLORIDE 0.9 % (FLUSH) 0.9 %
10 SYRINGE (ML) INJECTION EVERY 12 HOURS SCHEDULED
Status: DISCONTINUED | OUTPATIENT
Start: 2024-06-02 | End: 2024-06-02 | Stop reason: HOSPADM

## 2024-06-02 RX ADMIN — LIDOCAINE HYDROCHLORIDE 5 ML: 20 INJECTION, SOLUTION EPIDURAL; INFILTRATION; INTRACAUDAL; PERINEURAL at 10:44

## 2024-06-02 RX ADMIN — FENTANYL CITRATE 100 MCG: 50 INJECTION, SOLUTION INTRAMUSCULAR; INTRAVENOUS at 10:44

## 2024-06-02 RX ADMIN — Medication 999 ML/HR: at 13:05

## 2024-06-02 RX ADMIN — Medication 1 MILLI-UNITS/MIN: at 04:00

## 2024-06-02 RX ADMIN — FERROUS SULFATE TAB 325 MG (65 MG ELEMENTAL FE) 325 MG: 325 (65 FE) TAB at 17:40

## 2024-06-02 RX ADMIN — LIDOCAINE HYDROCHLORIDE AND EPINEPHRINE 3 ML: 15; 5 INJECTION, SOLUTION EPIDURAL at 10:39

## 2024-06-02 RX ADMIN — ACETAMINOPHEN 650 MG: 325 TABLET ORAL at 17:40

## 2024-06-02 RX ADMIN — Medication 10 ML/HR: at 10:45

## 2024-06-02 RX ADMIN — HYDROXYZINE PAMOATE 50 MG: 50 CAPSULE ORAL at 01:58

## 2024-06-02 RX ADMIN — BENZOCAINE AND LEVOMENTHOL: 200; 5 SPRAY TOPICAL at 15:37

## 2024-06-02 RX ADMIN — IBUPROFEN 600 MG: 600 TABLET, FILM COATED ORAL at 21:20

## 2024-06-02 RX ADMIN — WITCH HAZEL: 500 SOLUTION RECTAL; TOPICAL at 15:37

## 2024-06-02 RX ADMIN — DOCUSATE SODIUM 100 MG: 100 CAPSULE, LIQUID FILLED ORAL at 17:40

## 2024-06-02 RX ADMIN — LIDOCAINE HYDROCHLORIDE AND EPINEPHRINE 2 ML: 15; 5 INJECTION, SOLUTION EPIDURAL at 10:44

## 2024-06-02 RX ADMIN — IBUPROFEN 600 MG: 600 TABLET, FILM COATED ORAL at 15:37

## 2024-06-02 RX ADMIN — SODIUM CHLORIDE, POTASSIUM CHLORIDE, SODIUM LACTATE AND CALCIUM CHLORIDE 125 ML/HR: 600; 310; 30; 20 INJECTION, SOLUTION INTRAVENOUS at 04:00

## 2024-06-02 NOTE — PROGRESS NOTES
"[facility]  Obstetric Progress Note    Subjective     Patient:    The patient feels well.  Itching has improved since receiving medications.    Objective   Report received.  Prenatal records reviewed.  Patient seen and evaluated  Vital Signs Range for the last 24 hours  Temp:  [97.5 °F (36.4 °C)-98.2 °F (36.8 °C)] 98.2 °F (36.8 °C)   Temp src: Oral   BP: (111-142)/(67-81) 115/68   Heart Rate:  [] 94   Resp:  [16-18] 16   SpO2:  [99 %] 99 %           Weight:  [77.6 kg (171 lb)] 77.6 kg (171 lb)       Flowsheet Rows      Flowsheet Row First Filed Value   Admission Height 167.6 cm (66\") Documented at 06/02/2024 0044   Admission Weight 77.6 kg (171 lb) Documented at 06/02/2024 0044            Intake/Output last 24 hours:      Intake/Output Summary (Last 24 hours) at 6/2/2024 0732  Last data filed at 6/2/2024 0522  Gross per 24 hour   Intake --   Output 150 ml   Net -150 ml       Intake/Output this shift:    No intake/output data recorded.    Physical Exam:  General: Patient is in no acute distress   Heart CVS exam: normal rate, regular rhythm, normal S1, S2, no murmurs, rubs, clicks or gallops.   Lungs Chest: clear to auscultation, no wheezes, rales or rhonchi, symmetric air entry.     Abdomen Abdominal exam: Gravid, nontender, irregular contractions   Extremities Exam of extremities:  trace pedal edema, no clubbing or cyanosis     Presentation: Vertex   Cervix: Exam by: Method: sterile exam per RN   Dilation: Cervical Dilation (cm): 4   Effacement: Cervical Effacement: 80%   Station: Fetal Station: 1-->-2         Fetal Heart Rate Assessment   Method: Fetal HR Assessment Method: external   Beats/min: Fetal HR (beats/min): 135   Baseline: Fetal HR Baseline: normal range   Variability: Fetal HR Variability: moderate (amplitude range 6 to 25 bpm)   Accels: Fetal HR Accelerations: episodic, greater than/equal to 15 bpm, lasting at least 15 seconds   Decels: Fetal HR Decelerations: absent   Tracing Category: Fetal HR " Tracing Category: Category I     Uterine Assessment   Method: Method: palpation, external tocotransducer   Frequency (min): Contraction Frequency (Minutes): 2-4   Ctx Count in 10 min:     Duration:     Intensity: Contraction Intensity: mild by palpation   Intensity by IUPC:     Resting Tone: Uterine Resting Tone: soft by palpation   Resting Tone by IUPC:     Piedad Units:         Assessment & Plan       PUPPP (pruritic urticarial papules and plaques of pregnancy)        Assessment:  1.  Intrauterine pregnancy at 39w2d gestation with reactive fetal status.    2.  induction (augmentation) of labor  for term pregnancy, PUPPS  with advanced cervical dilatation  3.  Obstetrical history significant for  previous  .  4.  GBS status:   Group B Strep Culture   Date Value Ref Range Status   05/15/2024 No Group B Streptococcus isolated  Final       Plan:  1. Vaginal anticipated  2. Plan of care has been reviewed with patient and support person at bedside  3.  Risks, benefits of treatment plan have been discussed.  4.  All questions have been answered.  5.  Continue Pitocin augmentation.  Epidural when desired      Danica Hernandez MD  2024  07:32 EDT

## 2024-06-02 NOTE — H&P
[facility]  Obstetric History and Physical    Chief Complaint   Patient presents with    Itching       Subjective     Patient is a 23 y.o. female  currently at 39w2d, who presents with complaint of itching. She reports that for the past two days she has itching at night only when she is in bed.  She reports that most of the itching is in the belly and extremities areas.  She denies any recent travel and  has no itching complaints.  She reports irregular contractions.  No loss of fluid or vaginal bleeding. +FM.  She had Covid one month ago.    Her prenatal care is complicated by Covid infection and CHTN but no meds..  Her previous obstetric/gynecological history is noted for is non-contributory.    The following portions of the patients history were reviewed and updated as appropriate: current medications, allergies, past medical history, past surgical history, past family history, past social history, and problem list .       Prenatal Information:  Prenatal Results       Initial Prenatal Labs       Test Value Reference Range Date Time    Hemoglobin  14.4 g/dL 12.0 - 15.9 23 1109       13.6 g/dL 12.0 - 15.9 23 1503    Hematocrit  42.4 % 34.0 - 46.6 23 1109       39.4 % 34.0 - 46.6 23 1503    Platelets  212 10*3/mm3 140 - 450 23 1109       224 10*3/mm3 140 - 450 23 1503    Rubella IgG  10.10 index Immune >0.99 03/15/24 1018       16.50 index Immune >0.99 23 1109    Hepatitis B SAg  Non-Reactive  Non-Reactive 23 1109    Hepatitis C Ab  Non-Reactive  Non-Reactive 23 1109    RPR  Non-Reactive  Non-Reactive 21 0947    T. Pallidum Ab   Non-Reactive  Non-Reactive 23 1109    ABO  O   23 1109    Rh  Positive   23 1109    Antibody Screen  Negative   23 1109    HIV  Non-Reactive  Non-Reactive 23 1109    Urine Culture  25,000 CFU/mL Normal Urogenital Velvet   24 0936       No growth   23 1111    Gonorrhea  Not  Detected  Not Detected  11/21/23 1111    Chlamydia  Not Detected  Not Detected  11/21/23 1111    TSH  0.836 uIU/mL 0.270 - 4.200 09/12/23 1503    HgB A1c         Varicella IgG  1118 index Immune >165 03/15/24 1018    HgB Electrophoresis         Cystic fibrosis                   Fetal testing        Test Value Reference Range Date Time    NIPT        MSAFP        AFP-4                  2nd and 3rd Trimester       Test Value Reference Range Date Time    Hemoglobin (repeated)  10.7 g/dL 12.0 - 15.9 06/02/24 0131       11.8 g/dL 12.0 - 15.9 02/19/24 1405    Hematocrit (repeated)  34.2 % 34.0 - 46.6 06/02/24 0131       35.0 % 34.0 - 46.6 02/19/24 1405    Platelets   210 10*3/mm3 140 - 450 06/02/24 0131       202 10*3/mm3 140 - 450 02/19/24 1405       212 10*3/mm3 140 - 450 11/21/23 1109       224 10*3/mm3 140 - 450 09/12/23 1503    GCT  117 mg/dL 65 - 139 02/19/24 1405    Antibody Screen (repeated)        3rd TM syphilis scrn (repeated)  RPR         3rd TM syphilis scrn (repeated) FTA        GTT Fasting        GTT 1 Hr        GTT 2 Hr        GTT 3 Hr        Group B Strep  No Group B Streptococcus isolated   05/15/24 1235              Other testing        Test Value Reference Range Date Time    Parvo IgG         CMV IgG                   Drug Screening       Test Value Reference Range Date Time    Amphetamine Screen        Barbiturate Screen  Negative  Negative 11/21/23 1111    Benzodiazepine Screen  Negative  Negative 11/21/23 1111    Methadone Screen  Negative  Negative 11/21/23 1111    Phencyclidine Screen        Opiates Screen  Negative  Negative 11/21/23 1111    THC Screen  Negative  Negative 11/21/23 1111    Cocaine Screen  Negative  Negative 11/21/23 1111    Propoxyphene Screen        Buprenorphine Screen        Methamphetamine Screen        Oxycodone Screen  Negative  Negative 11/21/23 1111    Tricyclic Antidepressants Screen                  Legend    ^: Historical                          External Prenatal  Results       Pregnancy Outside Results - Transcribed From Office Records - See Scanned Records For Details       Test Value Date Time    ABO  O  23 1109    Rh  Positive  23 1109    Antibody Screen  Negative  23 1109    Varicella IgG  1118 index 03/15/24 1018    Rubella  10.10 index 03/15/24 1018       16.50 index 23 1109    Hgb  10.7 g/dL 24 0131       11.8 g/dL 24 1405       14.4 g/dL 23 1109       13.6 g/dL 23 1503    Hct  34.2 % 24 0131       35.0 % 24 1405       42.4 % 23 1109       39.4 % 23 1503    Glucose Fasting GTT       Glucose Tolerance Test 1 hour       Glucose Tolerance Test 3 hour       Gonorrhea (discrete)  Not Detected  23 1111    Chlamydia (discrete)  Not Detected  23 1111    RPR       VDRL       Syphilis Antibody       HBsAg  Non-Reactive  23 1109    Herpes Simplex Virus PCR       Herpes Simplex VIrus Culture       HIV  Non-Reactive  23 1109    Hep C RNA Quant PCR       Hep C Antibody  Non-Reactive  23 1109    AFP       Group B Strep  No Group B Streptococcus isolated  05/15/24 1235    GBS Susceptibility to Clindamycin       GBS Susceptibility to Erythromycin       Fetal Fibronectin       Genetic Testing, Maternal Blood                 Drug Screening       Test Value Date Time    NIPT        Urine Drug Screen       Amphetamine Screen       Barbiturate Screen  Negative  23 1111    Benzodiazepine Screen  Negative  23 1111    Methadone Screen  Negative  23 1111    Phencyclidine Screen       Opiates Screen  Negative  23 1111    THC Screen  Negative  23 1111    Cocaine Screen       Propoxyphene Screen       Buprenorphine Screen       Methamphetamine Screen       Oxycodone Screen  Negative  23 1111              Legend    ^: Historical                             Past OB History:     OB History    Para Term  AB Living   2 1 1 0 0 1   SAB IAB Ectopic Molar  Multiple Live Births   0 0 0 0 0 1      # Outcome Date GA Lbr King/2nd Weight Sex Type Anes PTL Lv   2 Current            1 Term 05/28/22 39w6d 09:59 / 01:21 3825 g (8 lb 6.9 oz) F Vag-Spont EPI N ALIX      Name: CAN BERG      Apgar1: 8  Apgar5: 9       Past Medical History: Past Medical History:   Diagnosis Date    Anxiety     Chronic hypertension affecting pregnancy 11/21/2023    Depression     Heart valve disease     Noted on previous echo    Lactose intolerance     Orthostatic hypotension     POTS (postural orthostatic tachycardia syndrome)     Tachycardia       Past Surgical History Past Surgical History:   Procedure Laterality Date    MANDIBLE SURGERY  2020    Reconstructive Jaw Surgery      Family History: Family History   Problem Relation Age of Onset    OCD Mother     Depression Mother     Anxiety disorder Mother     Heart disease Mother     Hyperlipidemia Mother     Depression Father     Hyperlipidemia Father     Hypertension Father     No Known Problems Sister     Drug abuse Brother     ADD / ADHD Brother     No Known Problems Maternal Aunt     Clotting disorder Paternal Aunt     No Known Problems Maternal Uncle     No Known Problems Paternal Uncle     Heart disease Maternal Grandfather     Melanoma Maternal Grandfather     Heart disease Maternal Grandmother     Lung cancer Maternal Grandmother     Stroke Paternal Grandfather     Heart disease Paternal Grandmother     Hypertension Paternal Grandmother     Clotting disorder Paternal Grandmother     Heart attack Paternal Grandmother     No Known Problems Cousin     No Known Problems Other     Breast cancer Neg Hx     Ovarian cancer Neg Hx     Uterine cancer Neg Hx     Colon cancer Neg Hx     Deep vein thrombosis Neg Hx     Pulmonary embolism Neg Hx     Alcohol abuse Neg Hx     Bipolar disorder Neg Hx     Dementia Neg Hx     Paranoid behavior Neg Hx     Schizophrenia Neg Hx     Seizures Neg Hx     Self-Injurious Behavior  Neg Hx     Suicide  Attempts Neg Hx       Social History:  reports that she has never smoked. She has never been exposed to tobacco smoke. She has never used smokeless tobacco.   reports that she does not currently use alcohol.   reports no history of drug use.        General ROS: Pertinent items are noted in HPI    Objective       Vital Signs Range for the last 24 hours  Temperature: Temp:  [97.5 °F (36.4 °C)] 97.5 °F (36.4 °C)   Temp Source: Temp src: Oral   BP: BP: (114-142)/(67-81) 142/81   Pulse: Heart Rate:  [] 115   Respirations: Resp:  [18] 18   SPO2: SpO2:  [99 %] 99 %   O2 Amount (l/min):     O2 Devices     Weight: Weight:  [77.6 kg (171 lb)] 77.6 kg (171 lb)     Physical Examination: General appearance - alert, well appearing, and in no distress  Mental status - alert, oriented to person, place, and time  Chest - clear to auscultation, no wheezes, rales or rhonchi, symmetric air entry  Heart - normal rate, regular rhythm, normal S1, S2, no murmurs, rubs, clicks or gallops  Abdomen - soft, nontender, gravis.  Fine uritcarial rash noted.  Neurological - alert, oriented, normal speech, no focal findings or movement disorder noted  Extremities - peripheral pulses normal, no pedal edema, no clubbing or cyanosis  Skin - normal coloration and turgor, no rashes, no suspicious skin lesions noted    Presentation: vtx   Cervix: Exam by: Method: sterile exam per RN   Dilation: Cervical Dilation (cm): 3   Effacement: Cervical Effacement: 80%   Station:         Fetal Heart Rate Assessment   Method: Fetal HR Assessment Method: external   Beats/min: Fetal HR (beats/min): 125   Baseline: Fetal HR Baseline: normal range   Variability: Fetal HR Variability: moderate (amplitude range 6 to 25 bpm)   Accels: Fetal HR Accelerations: episodic, greater than/equal to 15 bpm, lasting at least 15 seconds   Decels: Fetal HR Decelerations: absent         Uterine Assessment   Method: Method: palpation, external tocotransducer   Frequency (min):      Ctx Count in 10 min:     Duration:     Intensity: Contraction Intensity: no contractions       [place] Units:       GBS is negative      Assessment & Plan       PUPPP (pruritic urticarial papules and plaques of pregnancy)        Assessment:  1.  Intrauterine pregnancy at 39w2d gestation with reactive fetal status.    2.  IUP at 39 weeks EGA with likely Atypical PUPP.  I discussed induction due to her favorable cervix and gestational age vs. Waiting for her scheduled induction on 6/4.  She desires to proceed with induction.  She had mildly elevated BP but reports possible CHTN.  Will evaluated for PIH.  3.  Obstetrical history significant for is non-contributory.  4.  GBS status:   Group B Strep Culture   Date Value Ref Range Status   05/15/2024 No Group B Streptococcus isolated  Final       Plan:  1. Admit for labor augmentation with pitocin.  2. Plan of care has been reviewed with patient and patient agrees.   3.  Risks, benefits of treatment plan have been discussed.  4.  All questions have been answered.  5.  P/C ratio.      Hoang Hassan MD  6/2/2024  03:13 EDT

## 2024-06-02 NOTE — ANESTHESIA PROCEDURE NOTES
Labor Epidural    Pre-sedation assessment completed: 6/2/2024 10:30 AM    Patient reassessed immediately prior to procedure    Patient location during procedure: OB  Start Time: 6/2/2024 10:35 AM  Stop Time: 6/2/2024 10:45 AM  Performed By  CRNA/CAA: Diana Stanford CRNA  Preanesthetic Checklist  Completed: patient identified, IV checked, risks and benefits discussed, surgical consent, monitors and equipment checked, pre-op evaluation and timeout performed  Additional Notes  Epidural placed with ease on first attempt, no redirecting  Prep:  Pt Position:sitting  Sterile Tech:cap, gloves, sterile barrier, mask and gown  Prep:povidone-iodine 7.5% surgical scrub  Monitoring:blood pressure monitoring, continuous pulse oximetry and EKG  Epidural Block Procedure:  Approach:midline  Guidance:landmark technique and palpation technique  Location:L3-L4  Needle Type:Tuohy  Needle Gauge:17 G  Loss of Resistance Medium: air  Loss of Resistance: 7cm  Cath Depth at skin:12 cm  Paresthesia: none  Aspiration:negative  Test Dose:negative  Medication: lidocaine 1.5%-EPINEPHrine 1:200,000 (XYLOCAINE W/EPI) injection - Epidural   3 mL - 6/2/2024 10:39:00 AM   2 mL - 6/2/2024 10:44:00 AM  fentaNYL citrate (PF) (SUBLIMAZE) injection - Epidural   100 mcg - 6/2/2024 10:44:00 AM  lidocaine PF 2% (XYLOCAINE) injection - Epidural   5 mL - 6/2/2024 10:44:00 AM  Number of Attempts: 1  Post Assessment:  Dressing:occlusive dressing applied and secured with tape  Pt Tolerance:patient tolerated the procedure well with no apparent complications  Complications:no

## 2024-06-02 NOTE — PLAN OF CARE
Goal Outcome Evaluation:     Problem: Adult Inpatient Plan of Care  Goal: Patient-Specific Goal (Individualized)  Outcome: Ongoing, Not Progressing  Flowsheets (Taken 6/2/2024 9988)  Patient-Specific Goals (Include Timeframe): Cessation or decrease in itching     Problem: Adult Inpatient Plan of Care  Goal: Plan of Care Review  Outcome: Ongoing, Progressing  Goal: Absence of Hospital-Acquired Illness or Injury  Outcome: Ongoing, Progressing  Goal: Optimal Comfort and Wellbeing  Outcome: Ongoing, Progressing  Goal: Readiness for Transition of Care  Outcome: Ongoing, Progressing  Intervention: Mutually Develop Transition Plan  Recent Flowsheet Documentation  Taken 6/2/2024 0352 by Samantha Low, RN  Equipment Currently Used at Home: none  Taken 6/2/2024 0345 by Samantha Low, RN  Transportation Anticipated: car, drives self  Patient/Family Anticipated Services at Transition: none  Patient/Family Anticipates Transition to: home with family     Problem: Bleeding (Labor)  Goal: Hemostasis  Outcome: Ongoing, Progressing     Problem: Change in Fetal Wellbeing (Labor)  Goal: Stable Fetal Wellbeing  Outcome: Ongoing, Progressing     Problem: Delayed Labor Progression (Labor)  Goal: Effective Progression to Delivery  Outcome: Ongoing, Progressing     Problem: Infection (Labor)  Goal: Absence of Infection Signs and Symptoms  Outcome: Ongoing, Progressing     Problem: Labor Pain (Labor)  Goal: Acceptable Pain Control  Outcome: Ongoing, Progressing     Problem: Uterine Tachysystole (Labor)  Goal: Normal Uterine Contraction Pattern  Outcome: Ongoing, Progressing

## 2024-06-02 NOTE — NURSING NOTE
39.2 weeks presents with c/o for the last two nights her entire body has been itching until she can't sleep, pt. Denies ctx:, leakage of fluid or bleeding, states +fm noted, pt. Denies any new foods, medicine, or change in soap or detergents, denies any medical problems or problems with pregnancy so far,  irreg ctx: noted on monitor, abd soft, non-tender with palpation, no hives or any rash noted on pts. Body, [id number]  called with the above, orders received.

## 2024-06-02 NOTE — ANESTHESIA PREPROCEDURE EVALUATION
Anesthesia Evaluation     Patient summary reviewed and Nursing notes reviewed   no history of anesthetic complications:   NPO Solid Status: > 8 hours  NPO Liquid Status: > 2 hours           Airway   Mallampati: II  TM distance: >3 FB  Neck ROM: full  Dental - normal exam     Pulmonary - negative pulmonary ROS and normal exam   Cardiovascular   Exercise tolerance: good (4-7 METS)    Rhythm: regular  Rate: normal    (+) valvular problems/murmurs    ROS comment: POTS  PE comment: POTS    Neuro/Psych- negative ROS  (+) psychiatric history Anxiety and Depression  GI/Hepatic/Renal/Endo    (+) GERD well controlled    Musculoskeletal (-) negative ROS    Abdominal    Substance History - negative use     OB/GYN    (+) Pregnant    Comment: PUPPP      Other - negative ROS       ROS/Med Hx Other:                      Anesthesia Plan    ASA 2     epidural       Anesthetic plan, risks, benefits, and alternatives have been provided, discussed and informed consent has been obtained with: patient.  Pre-procedure education provided  Plan discussed with CRNA.        CODE STATUS:    Level Of Support Discussed With: Patient  Code Status (Patient has no pulse and is not breathing): CPR (Attempt to Resuscitate)  Medical Interventions (Patient has pulse or is breathing): Full Support

## 2024-06-02 NOTE — L&D DELIVERY NOTE
[facility]   Vaginal Delivery Note    Patient Name: Ana Paula Amanda  : 2000  MRN: [id number]    Date of Delivery: 2024     Diagnosis     Pre & Post-Delivery:  Intrauterine pregnancy at 39w2d  Labor status: Induced Onset of Labor     PUPPP (pruritic urticarial papules and plaques of pregnancy)             Problem List    Transfer to Postpartum     Review the Delivery Report for details.     Delivery     Delivery: Vaginal, Spontaneous     YOB: 2026    Time of Birth:  Gestational Age 12:22 PM   39w2d     Anesthesia: Epidural     Delivering clinician:     Forceps?   No   Vacuum? No    Shoulder dystocia present: No        Delivery narrative: Presented for evaluation of generalized pruritus.  She is 39 weeks and 2 days.  Initial cervical exam 4 cm of dilatation.  She was kept for induction/augmentation of her labor given the above scenario.  She received IV Pitocin. she had artificial rupture of membranes for clear fluids.  She had epidural analgesia.  She progressed to have a spontaneous vaginal delivery of a liveborn male infant over an intact perineum.  He was bulb suction at the perineum and then laid on the mother's abdomen where he was attended to by the nursery personnel.  After approximately 1 minute the umbilical cord was clamped in 2 places.  It was then cut in between by the father.  Samples of blood were collected from the umbilical cord and sent for arterial and venous blood gas analysis and Adry testing.  The placenta delivered intact with three-vessel cord and trailing membranes.  The uterus was massaged and the cavity explored with a sponge on a stick x 2.  The vaginal canal was inspected.  An abrasion was noted in the perineal area.  No repair required.   cc.  Placenta will be discarded.  Mom and dad are in the LDR in satisfactory condition.  Dad is at the bedside.      Infant     Findings: male  infant     Infant observations: Weight: No birth weight on file.    Length:   in  Observations/Comments:        Apgars: 9  @ 1 minute /    9  @ 5 minutes   Infant Name: [place]     Placenta & Cord         Placenta delivered  Spontaneous  at   6/2/2024 12:28 PM     Cord: 3 vessels  present.   Nuchal Cord?  no   Cord blood obtained: Yes    Cord gases obtained:  Yes    Cord gas results: Venous:    pH, Cord Venous   Date Value Ref Range Status   06/02/2024 7.425 (H) 7.310 - 7.370 pH Units Final     Base Excess, Cord Venous   Date Value Ref Range Status   06/02/2024 -2.5 (L) -2.0 - 2.0 mmol/L Final       Arterial:    pH, Cord Arterial   Date Value Ref Range Status   06/02/2024 7.35 (H) 7.21 - 7.31 pH Units Final     Base Exc, Cord Arterial   Date Value Ref Range Status   06/02/2024 -3.9 (L) -2.0 - 2.0 mmol/L Final        Repair     Episiotomy: None     No    Lacerations: Yes  Laceration Information  Laceration Repaired?   Perineal:  (Abrasion)  No   Periurethral:       Labial:       Sulcus:       Vaginal:       Cervical:            Estimated Blood Loss: Est. Blood Loss (mL): 300 mL (Filed from Delivery Summary) (06/02/24 1222)     Quantitative Blood Loss:          Complications     none    Disposition     Mother to Remain in LD  in stable condition currently.  Baby to remains with mom  in stable condition currently.    Danica Hernandez MD  06/02/24  12:55 EDT

## 2024-06-02 NOTE — NURSING NOTE
called give pt. Update, and lab results, pt. States itching has improved some with the administration of Vistaril but that she is still itching, states he will come and assess pt.

## 2024-06-02 NOTE — PLAN OF CARE
Problem: Adult Inpatient Plan of Care  Goal: Plan of Care Review  Outcome: Ongoing, Progressing  Goal: Patient-Specific Goal (Individualized)  Outcome: Ongoing, Progressing  Goal: Absence of Hospital-Acquired Illness or Injury  Outcome: Ongoing, Progressing  Goal: Optimal Comfort and Wellbeing  Outcome: Ongoing, Progressing  Intervention: Monitor Pain and Promote Comfort  Recent Flowsheet Documentation  Taken 6/2/2024 5200 by Ginny Mix RN  Pain Management Interventions: see MAR  Goal: Readiness for Transition of Care  Outcome: Ongoing, Progressing     Problem: Bleeding (Labor)  Goal: Hemostasis  Outcome: Ongoing, Progressing     Problem: Change in Fetal Wellbeing (Labor)  Goal: Stable Fetal Wellbeing  Outcome: Ongoing, Progressing     Problem: Delayed Labor Progression (Labor)  Goal: Effective Progression to Delivery  Outcome: Ongoing, Progressing     Problem: Infection (Labor)  Goal: Absence of Infection Signs and Symptoms  Outcome: Ongoing, Progressing     Problem: Labor Pain (Labor)  Goal: Acceptable Pain Control  Outcome: Ongoing, Progressing     Problem: Uterine Tachysystole (Labor)  Goal: Normal Uterine Contraction Pattern  Outcome: Ongoing, Progressing   Goal Outcome Evaluation:

## 2024-06-03 VITALS
RESPIRATION RATE: 16 BRPM | HEART RATE: 99 BPM | DIASTOLIC BLOOD PRESSURE: 67 MMHG | OXYGEN SATURATION: 100 % | SYSTOLIC BLOOD PRESSURE: 118 MMHG | TEMPERATURE: 98.2 F | WEIGHT: 171 LBS | HEIGHT: 66 IN | BODY MASS INDEX: 27.48 KG/M2

## 2024-06-03 PROBLEM — O26.86 PUPPP (PRURITIC URTICARIAL PAPULES AND PLAQUES OF PREGNANCY): Status: RESOLVED | Noted: 2024-06-02 | Resolved: 2024-06-03

## 2024-06-03 RX ORDER — ACETAMINOPHEN 325 MG/1
650 TABLET ORAL EVERY 6 HOURS PRN
Qty: 20 TABLET | Refills: 0 | Status: SHIPPED | OUTPATIENT
Start: 2024-06-03

## 2024-06-03 RX ORDER — IBUPROFEN 600 MG/1
600 TABLET ORAL EVERY 6 HOURS PRN
Qty: 30 TABLET | Refills: 0 | Status: SHIPPED | OUTPATIENT
Start: 2024-06-03

## 2024-06-03 RX ADMIN — IBUPROFEN 600 MG: 600 TABLET, FILM COATED ORAL at 05:34

## 2024-06-03 RX ADMIN — FERROUS SULFATE TAB 325 MG (65 MG ELEMENTAL FE) 325 MG: 325 (65 FE) TAB at 08:16

## 2024-06-03 RX ADMIN — FERROUS SULFATE TAB 325 MG (65 MG ELEMENTAL FE) 325 MG: 325 (65 FE) TAB at 18:05

## 2024-06-03 RX ADMIN — DOCUSATE SODIUM 100 MG: 100 CAPSULE, LIQUID FILLED ORAL at 08:16

## 2024-06-03 RX ADMIN — VITAMIN A, VITAMIN C, VITAMIN D, VITAMIN E, THIAMINE, RIBOFLAVIN, NIACIN, VITAMIN B6, FOLIC ACID, VITAMIN B12, CALCIUM, IRON, ZINC, COPPER 1 TABLET: 4000; 120; 400; 22; 1.84; 3; 20; 10; 1; 12; 200; 27; 25; 2 TABLET ORAL at 08:16

## 2024-06-03 RX ADMIN — IBUPROFEN 600 MG: 600 TABLET, FILM COATED ORAL at 16:58

## 2024-06-03 NOTE — DISCHARGE SUMMARY
" [facility]  Delivery Discharge Summary    Primary OB Clinician:     EDC: Estimated Date of Delivery: 24    Admitting Diagnosis:  PUPPP (pruritic urticarial papules and plaques of pregnancy) [O26.86]    Discharge Diagnosis:  Same as Admitting plus:   Pregnancy at 39w2d - Delivered     Antepartum complications: infection and PUPPS    Date of Delivery: 2024   Time of Delivery: 12:22 PM     Delivered By:  Danica Hernandez     Delivery Type: Vaginal, Spontaneous      Tubal Ligation: n/a    Baby:male  infant;   Apgar:  9  @ 1 minute /   Apgar:  9  @ 5 minutes   Weight: 3880 g (8 lb 8.9 oz)    Length: 20     Anesthesia: Epidural      Intrapartum complications: None    Laceration: Yes  Laceration Information  Laceration Repaired?   Perineal:   Abrasion  NO   Periurethral:       Labial:       Sulcus:       Vaginal:       Cervical:           Episiotomy: No    Placenta: Spontaneous     Feeding method: Breastfeeding Status: Yes    Rh Immune globulin given: not applicable    Rubella vaccine given: not applicable    inal Delivery Progress Note    Subjective   Subjective  Postpartum Day 1: Vaginal Delivery    The patient feels well and would like to be discharged today.  Her pain is well controlled with prescribed pain medications.   She is ambulating well.  Patient describes her bleeding as moderate lochia.    Breastfeeding: infant latching.    Objective     Objective (late entry patient seen and examined around 8:40 AM)  Vital Signs Range for the last 24 hours  Temperature: Temp:  [97.9 °F (36.6 °C)-98.4 °F (36.9 °C)] 98.2 °F (36.8 °C)   Temp Source: Temp src: Oral   BP: BP: (101-118)/(61-77) 118/67   Pulse: Heart Rate:  [] 99   Respirations: Resp:  [16-18] 16   SPO2:     O2 Amount (l/min):     O2 Devices Device (Oxygen Therapy): room air   Weight:       Admit Height:  Height: 167.6 cm (66\")    Physical Exam:  General:  no acute distresss.  Abdomen: Fundus: appropriate, firm, non tender  Extremities: normal, " atraumatic, no cyanosis, and trace edema.     Hospital course: Patient was admitted at term with advanced cervical dilatation due to incessant itching.  Diagnosed based on clinical presentation withPUPPS.  She had an uncomplicated vaginal delivery of a liveborn male infant over an intact perineum.  See delivery summary for details.  She has met the expected milestones for discharge and is in satisfactory condition to do so.  She will maintain pelvic rest.  Call or return to the hospital with excessive bleeding, infection or worsening of her depression.    Discharge Date: 6/3/2024; Discharge Time: 19:22 EDT        Plan:      Follow-up appointment with Dr. Zeb Elise    Electronically signed by Danica Hernandez MD, 06/03/24, 7:22 PM EDT.

## 2024-06-03 NOTE — ANESTHESIA POSTPROCEDURE EVALUATION
Patient: Ana Paula Amanda    Procedure Summary       Date: 06/02/24 Room / Location:     Anesthesia Start: 1030 Anesthesia Stop: 1222    Procedure: LABOR ANALGESIA Diagnosis:     Scheduled Providers:  Provider: Diana Stanford CRNA    Anesthesia Type: epidural ASA Status: 2            Anesthesia Type: epidural    Vitals  Vitals Value Taken Time   /67 06/03/24 1820   Temp 36.8 °C (98.2 °F) 06/03/24 1820   Pulse 99 06/03/24 1820   Resp 16 06/03/24 1820   SpO2 100 % 06/02/24 1239   Vitals shown include unfiled device data.        Post Anesthesia Care and Evaluation    Patient location during evaluation: bedside  Patient participation: complete - patient participated  Level of consciousness: awake  Pain score: 1  Pain management: adequate    Airway patency: patent  Anesthetic complications: No anesthetic complications    Cardiovascular status: acceptable  Respiratory status: acceptable  Hydration status: acceptable  Post Neuraxial Block status: Motor and sensory function returned to baseline and No signs or symptoms of PDPHNo anesthesia care post op

## 2024-06-03 NOTE — PLAN OF CARE
Problem: Bleeding (Labor)  Goal: Hemostasis  Outcome: Met     Problem: Change in Fetal Wellbeing (Labor)  Goal: Stable Fetal Wellbeing  Outcome: Met  Intervention: Promote and Monitor Fetal Wellbeing  Recent Flowsheet Documentation  Taken 6/3/2024 0810 by Angelica Hernandez RN  Body Position: sitting up in bed     Problem: Delayed Labor Progression (Labor)  Goal: Effective Progression to Delivery  Outcome: Met     Problem: Infection (Labor)  Goal: Absence of Infection Signs and Symptoms  Outcome: Met  Intervention: Prevent or Manage Infection  Recent Flowsheet Documentation  Taken 6/3/2024 0810 by Angelica Hernandez RN  Infection Prevention:   visitors restricted/screened   rest/sleep promoted   single patient room provided   hand hygiene promoted   equipment surfaces disinfected   environmental surveillance performed   cohorting utilized     Problem: Labor Pain (Labor)  Goal: Acceptable Pain Control  Outcome: Met     Problem: Uterine Tachysystole (Labor)  Goal: Normal Uterine Contraction Pattern  Outcome: Met   Goal Outcome Evaluation:

## 2024-06-03 NOTE — PLAN OF CARE
Problem: Adult Inpatient Plan of Care  Goal: Plan of Care Review  Outcome: Met  Flowsheets (Taken 6/3/2024 1845)  Progress: improving  Plan of Care Reviewed With: patient  Goal: Patient-Specific Goal (Individualized)  Outcome: Met  Goal: Absence of Hospital-Acquired Illness or Injury  Outcome: Met  Intervention: Identify and Manage Fall Risk  Recent Flowsheet Documentation  Taken 6/3/2024 0810 by Angelica Hernandez RN  Safety Promotion/Fall Prevention:   clutter free environment maintained   nonskid shoes/slippers when out of bed   safety round/check completed  Intervention: Prevent Skin Injury  Recent Flowsheet Documentation  Taken 6/3/2024 0810 by Angelica Hernandez RN  Body Position: sitting up in bed  Intervention: Prevent and Manage VTE (Venous Thromboembolism) Risk  Recent Flowsheet Documentation  Taken 6/3/2024 0810 by Angelica Hernandez RN  Activity Management: up ad naa  Intervention: Prevent Infection  Recent Flowsheet Documentation  Taken 6/3/2024 0810 by Angelica Hernandez RN  Infection Prevention:   visitors restricted/screened   rest/sleep promoted   single patient room provided   hand hygiene promoted   equipment surfaces disinfected   environmental surveillance performed   cohorting utilized  Goal: Optimal Comfort and Wellbeing  Outcome: Met  Intervention: Monitor Pain and Promote Comfort  Recent Flowsheet Documentation  Taken 6/3/2024 1650 by Angelica Hernandez RN  Pain Management Interventions: see MAR  Taken 6/3/2024 0810 by Angelica Hernandez RN  Pain Management Interventions: see MAR  Intervention: Provide Person-Centered Care  Recent Flowsheet Documentation  Taken 6/3/2024 0810 by Angelica Hernandez RN  Trust Relationship/Rapport:   care explained   choices provided   emotional support provided   empathic listening provided   questions answered   questions encouraged   reassurance provided   thoughts/feelings acknowledged  Goal: Readiness for Transition of Care  Outcome: Met     Problem: Bleeding  (Labor)  Goal: Hemostasis  Outcome: Met     Problem: Change in Fetal Wellbeing (Labor)  Goal: Stable Fetal Wellbeing  Outcome: Met  Intervention: Promote and Monitor Fetal Wellbeing  Recent Flowsheet Documentation  Taken 6/3/2024 0810 by Angelica Hernandez RN  Body Position: sitting up in bed     Problem: Delayed Labor Progression (Labor)  Goal: Effective Progression to Delivery  Outcome: Met     Problem: Infection (Labor)  Goal: Absence of Infection Signs and Symptoms  Outcome: Met  Intervention: Prevent or Manage Infection  Recent Flowsheet Documentation  Taken 6/3/2024 0810 by Angelica Hernandez RN  Infection Prevention:   visitors restricted/screened   rest/sleep promoted   single patient room provided   hand hygiene promoted   equipment surfaces disinfected   environmental surveillance performed   cohorting utilized     Problem: Labor Pain (Labor)  Goal: Acceptable Pain Control  Outcome: Met     Problem: Uterine Tachysystole (Labor)  Goal: Normal Uterine Contraction Pattern  Outcome: Met     Problem: Breastfeeding  Goal: Effective Breastfeeding  Outcome: Met     Problem: Adjustment to Role Transition (Postpartum Vaginal Delivery)  Goal: Successful Maternal Role Transition  Outcome: Met     Problem: Bleeding (Postpartum Vaginal Delivery)  Goal: Hemostasis  Outcome: Met     Problem: Infection (Postpartum Vaginal Delivery)  Goal: Absence of Infection Signs/Symptoms  Outcome: Met  Intervention: Prevent or Manage Infection  Recent Flowsheet Documentation  Taken 6/3/2024 0810 by Angelica Hernandez RN  Perineal Care:   absorbent brief/pad changed   perineal spray bottle/warm water use encouraged   perineum cleansed   medicated pads applied     Problem: Pain (Postpartum Vaginal Delivery)  Goal: Acceptable Pain Control  Outcome: Met  Intervention: Prevent or Manage Pain  Recent Flowsheet Documentation  Taken 6/3/2024 1650 by Angelica Hernandez, RN  Pain Management Interventions: see MAR  Taken 6/3/2024 0810 by Angelica Hernandez,  RN  Pain Management Interventions: see MAR     Problem: Urinary Retention (Postpartum Vaginal Delivery)  Goal: Effective Urinary Elimination  Outcome: Met   Goal Outcome Evaluation:  Plan of Care Reviewed With: patient        Progress: improving

## 2024-06-03 NOTE — PLAN OF CARE
Problem: Adult Inpatient Plan of Care  Goal: Plan of Care Review  Outcome: Ongoing, Progressing  Goal: Patient-Specific Goal (Individualized)  Outcome: Ongoing, Progressing  Goal: Absence of Hospital-Acquired Illness or Injury  Outcome: Ongoing, Progressing  Intervention: Identify and Manage Fall Risk  Recent Flowsheet Documentation  Taken 6/3/2024 0330 by Meghana Tijerina RN  Safety Promotion/Fall Prevention:   nonskid shoes/slippers when out of bed   safety round/check completed  Taken 6/3/2024 0100 by Meghana Tijerina RN  Safety Promotion/Fall Prevention:   nonskid shoes/slippers when out of bed   safety round/check completed  Taken 6/3/2024 0010 by Meghana Tijerina RN  Safety Promotion/Fall Prevention:   nonskid shoes/slippers when out of bed   safety round/check completed  Taken 6/2/2024 2310 by Meghana Tijerina RN  Safety Promotion/Fall Prevention:   nonskid shoes/slippers when out of bed   safety round/check completed  Taken 6/2/2024 2120 by Meghana Tijerina RN  Safety Promotion/Fall Prevention:   nonskid shoes/slippers when out of bed   safety round/check completed  Taken 6/2/2024 2116 by Meghana Tijerina RN  Safety Promotion/Fall Prevention:   nonskid shoes/slippers when out of bed   safety round/check completed  Taken 6/2/2024 1930 by Meghana Tijerina RN  Safety Promotion/Fall Prevention:   nonskid shoes/slippers when out of bed   safety round/check completed  Intervention: Prevent and Manage VTE (Venous Thromboembolism) Risk  Recent Flowsheet Documentation  Taken 6/2/2024 2120 by Meghana Tijerina RN  Activity Management: up ad naa  Range of Motion: active ROM (range of motion) encouraged  Goal: Optimal Comfort and Wellbeing  Outcome: Ongoing, Progressing  Intervention: Monitor Pain and Promote Comfort  Recent Flowsheet Documentation  Taken 6/2/2024 2120 by Meghana Tijerina RN  Pain Management Interventions: see MAR  Goal: Readiness for Transition of Care  Outcome: Ongoing,  Progressing     Problem: Bleeding (Labor)  Goal: Hemostasis  Outcome: Ongoing, Progressing     Problem: Change in Fetal Wellbeing (Labor)  Goal: Stable Fetal Wellbeing  Outcome: Ongoing, Progressing     Problem: Delayed Labor Progression (Labor)  Goal: Effective Progression to Delivery  Outcome: Ongoing, Progressing     Problem: Infection (Labor)  Goal: Absence of Infection Signs and Symptoms  Outcome: Ongoing, Progressing     Problem: Labor Pain (Labor)  Goal: Acceptable Pain Control  Outcome: Ongoing, Progressing     Problem: Uterine Tachysystole (Labor)  Goal: Normal Uterine Contraction Pattern  Outcome: Ongoing, Progressing     Problem: Breastfeeding  Goal: Effective Breastfeeding  Outcome: Ongoing, Progressing   Goal Outcome Evaluation:         Ambulating without difficulty  Tolerating po fluids   Tolerates regular diet

## 2024-06-03 NOTE — LACTATION NOTE
Discussed attempting to feed baby every 3 hours, allowing unlimited access to breast with unlimited time on breast. Encouraged to do awake, skin to skin as much as possible. LC discussed  behaviors for the first two weeks of breastfeeding, discussed breastfeeding expectations and encouraged her to breastfeed/pump frequently for good milk supply. LC discussed nipple care, plugged ducts, engorgement, and breast infection. D/C instructions gone over, included hand hygiene, respiratory hygiene and breastfeeding when pt is sick, LC encouraged pt to see the pediatrician within two days of discharge for follow up. LC informed pt that LC was available after D/C for assistance with breastfeeding

## 2024-06-04 NOTE — NON STRESS TEST
[facility]   OB NST Note    2024   Name:  Ana Paula Amanda  MRN: [id number]    Subjective:  23 y.o.  at 39w2d    Indication: +COVID    NST:   Baseline: 125  Variability:   Moderate/Normal (amplitude 6-25 bpm)  Accelerations: Present (32 weeks+) 15 x 15 bpm  Decelerations: Absent   Contractions:  Absent    NST interpretation: reactive    Documented Vitals    24 1525   BP: 110/73   Pulse: 96   Resp: 16         Lab Results (last 24 hours)       ** No results found for the last 24 hours. **           Assessment:  23 y.o.  AT 39w2d  +COVID    Plan:   OB Precautions, Keep scheduled NSTs, Keep office visit      Electronically signed by Ish Wesley MD, 24, 7:17 AM EDT.

## 2024-06-09 ENCOUNTER — TELEPHONE (OUTPATIENT)
Dept: LACTATION | Facility: HOSPITAL | Age: 24
End: 2024-06-09
Payer: COMMERCIAL

## 2024-06-12 ENCOUNTER — MATERNAL SCREENING (OUTPATIENT)
Dept: CALL CENTER | Facility: HOSPITAL | Age: 24
End: 2024-06-12
Payer: COMMERCIAL

## 2024-06-12 NOTE — OUTREACH NOTE
Maternal Screening Survey      Flowsheet Row Responses   Facility patient discharged from? [facility]   Attempt successful? Yes   Call start time 1202   Call end time 1206   EPD Scale: Able to Laugh 0-->as much as she always could   EPD Scale: Looked Forward 1-->rather less than she used to   EPD Scale: Blamed Self 2-->yes, some of the time   EPD Scale: Been Anxious 0-->no, not at all   EPD Scale: Felt Panicky 1-->no, not much   EPD Scale: Things Getting on Top 1-->no, most of the time has coped quite well   EPD Scale: Difficulty Sleeping 0-->no, not at all   EPD Scale: Sad or Miserable 1-->not very often   EPD Scale: Crying 1-->only occasionally   EPD Scale: Thought of Harming Self 0-->never   [place]  Depression Score 7   Did any of your parents have problems with alcohol or drug use? No   Do any of your peers have problems with alcohol or drug use? No   Does your partner have problems with alcohol or drug use? No   Before you were pregnant did you have problems with alcohol or drug use? (past) No   In the past month, did you drink beer, wine, liquor or use any other drugs? (pregnancy) No   Maternal Screening call completed Yes   Call end time 1206              ARTUR HUNT - Registered Nurse

## 2024-07-10 ENCOUNTER — POSTPARTUM VISIT (OUTPATIENT)
Dept: OBSTETRICS AND GYNECOLOGY | Facility: CLINIC | Age: 24
End: 2024-07-10
Payer: COMMERCIAL

## 2024-07-10 VITALS — DIASTOLIC BLOOD PRESSURE: 60 MMHG | WEIGHT: 145 LBS | SYSTOLIC BLOOD PRESSURE: 102 MMHG | BODY MASS INDEX: 23.4 KG/M2

## 2024-07-10 DIAGNOSIS — N89.8 VAGINAL DISCHARGE: Primary | ICD-10-CM

## 2024-07-10 DIAGNOSIS — Z30.09 ENCOUNTER FOR COUNSELING REGARDING CONTRACEPTION: ICD-10-CM

## 2024-07-10 PROBLEM — K59.01 SLOW TRANSIT CONSTIPATION: Status: RESOLVED | Noted: 2024-05-15 | Resolved: 2024-07-10

## 2024-07-10 PROBLEM — O10.919 CHRONIC HYPERTENSION AFFECTING PREGNANCY: Status: RESOLVED | Noted: 2023-11-21 | Resolved: 2024-07-10

## 2024-07-10 LAB
CANDIDA SPECIES: NEGATIVE
GARDNERELLA VAGINALIS: POSITIVE
T VAGINALIS DNA VAG QL PROBE+SIG AMP: NEGATIVE

## 2024-07-10 PROCEDURE — 87510 GARDNER VAG DNA DIR PROBE: CPT | Performed by: STUDENT IN AN ORGANIZED HEALTH CARE EDUCATION/TRAINING PROGRAM

## 2024-07-10 PROCEDURE — 87660 TRICHOMONAS VAGIN DIR PROBE: CPT | Performed by: STUDENT IN AN ORGANIZED HEALTH CARE EDUCATION/TRAINING PROGRAM

## 2024-07-10 PROCEDURE — 87480 CANDIDA DNA DIR PROBE: CPT | Performed by: STUDENT IN AN ORGANIZED HEALTH CARE EDUCATION/TRAINING PROGRAM

## 2024-07-10 NOTE — PROGRESS NOTES
POSTPARTUM Follow Up Visit    CC:  Postpartum     HPI:  Ana Paula Amanda is a 23 y.o.        Antepartum or Postpartum complications:   Patient Active Problem List   Diagnosis    Irritable bowel syndrome with both constipation and diarrhea    Anxiety and depression    Chronic hypertension affecting pregnancy    Slow transit constipation    GEOVANNI (generalized anxiety disorder)    Major depressive disorder, recurrent episode, moderate    Panic disorder    Postpartum care and examination immediately after delivery       Delivery type:    Perineum : Intact  Feeding: Bottle     Pain:  No  Vaginal Bleeding:  discharge, no bleeding; no sexual activity. NO urinary or bowel complaints.   EPDS score: 12 (7/10/2024  3:16 PM)  No SI or HI; guns at home, in safes without ammo. Never had any SI. Would seek care if thoughts became apparent. Feeling anxious with new home, Amadou back at work. 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 Has follow up with Dr. Esquivel on Tuesday of next week.     Plans for BC:  IUD  Last PAP:   Last Completed Pap Smear            PAP SMEAR (Every 3 Years) Next due on 2024  IGP,CtNgTv,rfx Aptima HPV ASCU    2020  Outside Procedure: ID CYTOPATH CERV/VAG THIN LAYER                    /60   Wt 65.8 kg (145 lb)   LMP 2023   Breastfeeding No   BMI 23.40 kg/m²     Physical Exam  Vitals and nursing note reviewed. Exam conducted with a chaperone present.   Constitutional:       General: She is not in acute distress.     Appearance: Normal appearance. She is not toxic-appearing.   HENT:      Head: Normocephalic and atraumatic.   Eyes:      Extraocular Movements: Extraocular movements intact.      Conjunctiva/sclera: Conjunctivae normal.   Cardiovascular:      Pulses: Normal pulses.   Pulmonary:      Effort: Pulmonary effort is normal.   Abdominal:      General: There is no distension.      Palpations: Abdomen is soft.      Tenderness: There is no  abdominal tenderness.   Genitourinary:     General: Normal vulva.      Exam position: Lithotomy position.      Labia:         Right: No tenderness, lesion or injury.         Left: No tenderness, lesion or injury.       Vagina: Normal.      Cervix: Discharge present.      Uterus: Normal.       Adnexa: Right adnexa normal and left adnexa normal.      Comments: Good Kegel  Musculoskeletal:      Cervical back: Normal range of motion.   Skin:     General: Skin is warm and dry.   Neurological:      Mental Status: She is alert and oriented to person, place, and time.   Psychiatric:         Mood and Affect: Mood normal.         Behavior: Behavior normal.         Thought Content: Thought content normal.           ASSESSMENT AND PLAN:  Ana Paula Amanda is a 23 y.o.  presenting for postpartum evaluation.  Patient now postpartum day number 38.  Patient has experienced a weight loss of 26# since delivery.  Patient desires Mirena IUD for contraception.  Risk benefits and alternatives of contraception discussed with patient.  No absolute contraindications to contraception method.  Recommendation for evaluation with primary care physician within 3 to 6 months from delivery.     Diagnoses and all orders for this visit:    1. Vaginal discharge (Primary)  -     Gardnerella vaginalis, Trichomonas vaginalis, Candida albicans, DNA - Swab, Vagina    2. Postpartum follow-up    3. Encounter for counseling regarding contraception  -     hCG, Quantitative, Pregnancy; Future        Counseling:  All birth control options reviewed in detail.  R/B/A/SE/E of each wrt pts PMHx and prior BC use  May resume normal activities  Core strengthening exercises reviewed and recommended  Kegel exercises reviewed and recommended  Ok to return to work/school once patient desires/maternity leave completed  Abstinence until IUD/Nexplanon placed, [facility] day prior to placement        Follow Up:  Return in about 2 weeks (around 2024) for IUD  insertion.          Zeb Elise MD  07/10/2024

## 2024-07-11 RX ORDER — METRONIDAZOLE 500 MG/1
500 TABLET ORAL 2 TIMES DAILY
Qty: 14 TABLET | Refills: 0 | Status: SHIPPED | OUTPATIENT
Start: 2024-07-11 | End: 2024-07-18

## 2024-07-16 ENCOUNTER — TELEMEDICINE (OUTPATIENT)
Dept: PSYCHIATRY | Facility: CLINIC | Age: 24
End: 2024-07-16
Payer: COMMERCIAL

## 2024-07-16 DIAGNOSIS — F33.1 MAJOR DEPRESSIVE DISORDER, RECURRENT EPISODE, MODERATE: ICD-10-CM

## 2024-07-16 DIAGNOSIS — F41.1 GAD (GENERALIZED ANXIETY DISORDER): Primary | ICD-10-CM

## 2024-07-16 DIAGNOSIS — F41.0 PANIC DISORDER: ICD-10-CM

## 2024-07-16 PROCEDURE — 99214 OFFICE O/P EST MOD 30 MIN: CPT | Performed by: PSYCHIATRY & NEUROLOGY

## 2024-07-16 RX ORDER — ESCITALOPRAM OXALATE 10 MG/1
10 TABLET ORAL DAILY
Qty: 30 TABLET | Refills: 1 | Status: SHIPPED | OUTPATIENT
Start: 2024-07-16

## 2024-07-16 NOTE — PROGRESS NOTES
"[facility]  Follow-up Visit    Chief Complaint: \"I've struggled with anxiety and depression for basically as long as I can remember... 2 years ago my brother  of an overdose... basically since then everything has gotten worse.\"     History of Present Illness: Ana Paula Amanda is a 23 y.o. female who presents today for follow-up regarding MDD, GEOVANNI, panic. Last seen:  at which time sertraline was titrated. She presents unaccompanied in no acute distress and engages with me appropriately. Services today rendered via telehealth ([facility]) for which the patient provided informed consent. Patient is aware she can refuse to be seen remotely at any time. Patient was located in a secure, remote environment (home office) as was the provider (in-office). I confirmed the patient's identity prior to evaluation and reiterated my credentials; there were no technical issues during the session today. Psychotropic regimen perceived to be partially effective. Side-effects per given history: GI distress.    Current treatment regimen includes:   - sertraline 50 mg HS    Today the patient feels she's perhaps doing marginally better, but has had a good deal of life upheaval since our last visit (generally constructive changes). She'd like to try a new medication given continued GI distress with sertraline s/p 6 weeks of use. Depressive symptoms are partially managed with current interventions. Anxious symptoms are partially managed with current interventions. Panic symptoms are partially managed with current interventions. Thought process and content are devoid of overt aberration suggestive of acute jaye/psychosis. Speech pattern consistent with former exam. The patient denies SI/HI/AVH.   - contextual changes: moved,  has started a new job, and she gave birth to her son, Eduardo (had to be induced a bit early, but otherwise things went relatively according to birthing plan);   - " sleep: maintenance is disrupted for feeding (bottle-feeding)  - appetite: variable    I have counseled the patient with regard to diagnoses and the recommended treatment regimen as documented below: I will be prescribing escitalopram for MDD, GEOVANNI. Patient has been advised that SRIs may take up to 6-8 weeks for full effect and have a possibility of exacerbating mood/anxiety issues for which they are prescribed to the degree that, in some cases, their use may lead to acute suicidality. Patient contracts for safety appropriately. More common SE - sexual dysfunction, GI upset, tremors - were also reviewed. Patient acknowledges the diagnoses per my rendered interpretation. Patient demonstrates understanding of potential risks/benefits/side effects associated with this regimen and is amenable to proceed in this fashion.     Psychotherapy  - Time: 12 minutes  - interventions employed: the therapeutic alliance was strengthened to encourage the patient to express their thoughts and feelings freely. Esteem building was enhanced through praise, reassurance, normalizing/challenging, and encouragement as appropriate. Coping skills were enhanced to build distress tolerance skills and emotional regulation. Allowed patient to freely discuss issues without interruption or judgement with unconditional positive regard, active listening skills, and empathy. Provided a safe, confidential environment to facilitate the development of a positive therapeutic relationship and encourage open, honest communication. Assisted patient in processing session content; acknowledged and normalized/addressed, as appropriate, patient’s thoughts, feelings, and concerns by utilizing a person-centered approach in efforts to build appropriate rapport and a positive therapeutic relationship.   - Diagnoses: see assessment and plan below  - Symptoms: see subjective above  - Goals   - patient: improve mood, mitigate anxiety, bolster functional capacity   -  "provider: challenge patterns of living conducive to pathology, strengthen defenses, promote problems solving, restore adaptive functioning and provide symptom relief.  - Treatment plan: continue supportive psychotherapy in subsequent appointments to provide symptom relief; see assessment and plan below for additional details:   - iteration: 1   - progress: partial   - (X)illumination, (X)contextualization, (working)detection, (working)development, (-)elaboration, (-)refinement  - functional status: fair  - mental status exam: as below  - prognosis: good    Psychiatric History:  Diagnoses: depression and anxiety  Outpatient history: denies  Inpatient history: denies  Medication trials: hydroxyzine, trazodone, lorazepam (was helpful), sertraline (GI distress), fluoxetine  Other treatment modalities: has done some therapy in the past  Presenting regimen: N/A  Self harm: denies  Suicide attempts: denies     Substance Abuse History:   Types/methods/frequency: marijuana in high school, not since     Social History:  Residence: lives in an apartment with her  (2023), 1 YO daughter, and son (Eduardo, born 2024)  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  Education: RN + NP  Pertinent developmental history: denies; denies abuse hx  Pertinent legal history: denies  Hobbies/interests: chayo, reading  Muslim: \"in a gray space\"; raised Protestant (non-Druze)  Exercise: denies  Dietary habits: defer  Sleep hygiene: defer  Social habits: no pertinent issues  Sunlight: no concern for under-exposure  Caffeine intake: no pertinent issues; 1 cup of coffee in the morning, no tea, occasional soda, rare energy drink  Hydration habits: no pertinent issues   history: denies    Social History     Socioeconomic History    Marital status:    Tobacco Use    Smoking status: Never     Passive exposure: Never    Smokeless tobacco: Never   Vaping Use    Vaping status: Never Used   Substance and Sexual " Activity    Alcohol use: Not Currently     Comment: socially    Drug use: Never    Sexual activity: Yes     Partners: Male     Birth control/protection: None     Tobacco use counseling/intervention: N/A, patient does not use tobacco; patient has been counseled with regard to risks of tobacco use.    PHQ-9 Depression Screening  PHQ-9 Total Score:      Little interest or pleasure in doing things?     Feeling down, depressed, or hopeless?     Trouble falling or staying asleep, or sleeping too much?     Feeling tired or having little energy?     Poor appetite or overeating?     Feeling bad about yourself - or that you are a failure or have let yourself or your family down?     Trouble concentrating on things, such as reading the newspaper or watching television?     Moving or speaking so slowly that other people could have noticed? Or the opposite - being so fidgety or restless that you have been moving around a lot more than usual?     Thoughts that you would be better off dead, or of hurting yourself in some way?     PHQ-9 Total Score       Change in PHQ-9 since last measure: N/A (13)    GEOVANNI-7       Change in GEOVANNI-7 since last measure: N/A (19)    Problem List:  Patient Active Problem List   Diagnosis    Irritable bowel syndrome with both constipation and diarrhea    GEOVANNI (generalized anxiety disorder)    Major depressive disorder, recurrent episode, moderate    Panic disorder     Allergy:   Allergies   Allergen Reactions    Lactose Intolerance (Gi) GI Intolerance      Discontinued Medications:  There are no discontinued medications.    Current Medications:   Current Outpatient Medications   Medication Sig Dispense Refill    metroNIDAZOLE (Flagyl) 500 MG tablet Take 1 tablet by mouth 2 (Two) Times a Day for 7 days. 14 tablet 0    sertraline (Zoloft) 50 MG tablet Take 0.5 tablets by mouth Every Night for 7 days, THEN 1 tablet Every Night for 23 days. 27 tablet 0     No current facility-administered medications for this  visit.     Past Medical History:  Past Medical History:   Diagnosis Date    Anxiety     Chronic hypertension affecting pregnancy 11/21/2023    Depression     Heart valve disease     Noted on previous echo    Lactose intolerance     Orthostatic hypotension     POTS (postural orthostatic tachycardia syndrome)     Tachycardia      Past Surgical History:  Past Surgical History:   Procedure Laterality Date    MANDIBLE SURGERY  2020    Reconstructive Jaw Surgery     Mental Status Exam:   Appearance: well-groomed, sits upright, age-appropriate, post-gestational habitus  Behavior: calm, cooperative, appropriate in demeanor, appropriate eye-contact  Mood/affect: dysphoric / mood-congruent, but appropriate in both range and amplitude  Speech: within expected variance; appropriate rate, appropriate rhythm, appropriate tone; non-pressured  Thought Process: linear, goal-directed; no FOI or JOAN; abstraction intact  Thought Content: coherent, devoid of overt delusions/perceptual disturbances  SI/HI: denies both SI and HI; exhibits future-orientation, self-advocates appropriately, no regular self-harm, no appreciable intent  Memory: no overt deficits  Orientation: oriented to person/place/time/situation  Concentration: appropriate during interview  Intellectual capacity: presumptively average  Insight: fair by given history/exam  Judgment: appropriate by given history/exam  Psychomotor: no appreciable latency/retardation/agitation/tremor  Gait: deferred    Vital Signs:   There were no vitals taken for this visit.     Lab Results:   Postpartum Visit on 07/10/2024   Component Date Value Ref Range Status    GARDNERELLA VAGINALIS 07/10/2024 Positive (A)  Negative Final    TRICHOMONAS VAGINALIS 07/10/2024 Negative  Negative Final    ABEL SPECIES 07/10/2024 Negative  Negative Final   Admission on 06/02/2024, Discharged on 06/03/2024   Component Date Value Ref Range Status    WBC 06/02/2024 9.21  3.40 - 10.80 10*3/mm3 Final    RBC  06/02/2024 4.33  3.77 - 5.28 10*6/mm3 Final    Hemoglobin 06/02/2024 10.7 (L)  12.0 - 15.9 g/dL Final    Hematocrit 06/02/2024 34.2  34.0 - 46.6 % Final    MCV 06/02/2024 79.0  79.0 - 97.0 fL Final    MCH 06/02/2024 24.7 (L)  26.6 - 33.0 pg Final    MCHC 06/02/2024 31.3 (L)  31.5 - 35.7 g/dL Final    RDW 06/02/2024 14.4  12.3 - 15.4 % Final    RDW-SD 06/02/2024 40.8  37.0 - 54.0 fl Final    MPV 06/02/2024    Final    Test unavailable due to sample interference    Platelets 06/02/2024 210  140 - 450 10*3/mm3 Final    Glucose 06/02/2024 80  65 - 99 mg/dL Final    BUN 06/02/2024 9  6 - 20 mg/dL Final    Creatinine 06/02/2024 0.58  0.57 - 1.00 mg/dL Final    Sodium 06/02/2024 138  136 - 145 mmol/L Final    Potassium 06/02/2024 3.9  3.5 - 5.2 mmol/L Final    Chloride 06/02/2024 105  98 - 107 mmol/L Final    CO2 06/02/2024 20.3 (L)  22.0 - 29.0 mmol/L Final    Calcium 06/02/2024 8.7  8.6 - 10.5 mg/dL Final    Total Protein 06/02/2024 6.5  6.0 - 8.5 g/dL Final    Albumin 06/02/2024 3.7  3.5 - 5.2 g/dL Final    ALT (SGPT) 06/02/2024 8  1 - 33 U/L Final    AST (SGOT) 06/02/2024 14  1 - 32 U/L Final    Alkaline Phosphatase 06/02/2024 194 (H)  39 - 117 U/L Final    Total Bilirubin 06/02/2024 0.3  0.0 - 1.2 mg/dL Final    Globulin 06/02/2024 2.8  gm/dL Final    A/G Ratio 06/02/2024 1.3  g/dL Final    BUN/Creatinine Ratio 06/02/2024 15.5  7.0 - 25.0 Final    Anion Gap 06/02/2024 12.7  5.0 - 15.0 mmol/L Final    eGFR 06/02/2024 129.0  >60.0 mL/min/1.73 Final    Bile Acids Total 06/02/2024 6  0 - 10 umol/L Final    Color 06/02/2024 Yellow  Yellow, Straw, Dark Yellow, Christelle Final    Clarity, UA 06/02/2024 Clear  Clear Final    Glucose, UA 06/02/2024 Negative  Negative mg/dL Final    Bilirubin 06/02/2024 Negative  Negative Final    Ketones, UA 06/02/2024 Negative  Negative Final    Specific Gravity  06/02/2024 1.015  1.005 - 1.030 Final    Blood, UA 06/02/2024 Negative  Negative Final    pH, Urine 06/02/2024 6.5  5.0 - 8.0 Final     Protein, POC 06/02/2024 Negative  Negative mg/dL Final    Urobilinogen, UA 06/02/2024 Normal  Normal, 0.2 E.U./dL Final    Leukocytes 06/02/2024 Trace (A)  Negative Final    Nitrite, UA 06/02/2024 Negative  Negative Final    Treponemal AB Total 06/02/2024 Non-Reactive  Non-Reactive Final    Amphet/Methamphet, Screen 06/02/2024 Negative  Negative Final    Barbiturates Screen, Urine 06/02/2024 Negative  Negative Final    Benzodiazepine Screen, Urine 06/02/2024 Negative  Negative Final    Cocaine Screen, Urine 06/02/2024 Negative  Negative Final    Opiate Screen 06/02/2024 Negative  Negative Final    THC, Screen, Urine 06/02/2024 Negative  Negative Final    Methadone Screen, Urine 06/02/2024 Negative  Negative Final    Oxycodone Screen, Urine 06/02/2024 Negative  Negative Final    Fentanyl, Urine 06/02/2024 Negative  Negative Final    ABO Type 06/02/2024 O   Final    RH type 06/02/2024 Positive   Final    Antibody Screen 06/02/2024 Negative   Final    T&S Expiration Date 06/02/2024 6/5/2024 11:59:59 PM   Final    Creatinine, Urine 06/02/2024 67.6  mg/dL Final    Total Protein, Urine 06/02/2024 17.0  mg/dL Final    Protein/Creatinine Ratio, Urine 06/02/2024 0.25   Final    pH, Cord Arterial 06/02/2024 7.35 (H)  7.21 - 7.31 pH Units Final    pCO2, Cord Arterial 06/02/2024 39.0  33.0 - 49.0 mmHg Final    Base Exc, Cord Arterial 06/02/2024 -3.9 (L)  -2.0 - 2.0 mmol/L Final    pO2, Cord Arterial 06/02/2024 <40.5  mmHg Final    HCO3, Cord Arterial 06/02/2024 21.2  mmol/L Final    pH, Cord Venous 06/02/2024 7.425 (H)  7.310 - 7.370 pH Units Final    pCO2, Cord Venous 06/02/2024 32.6  28.0 - 40.0 mm Hg Final    pO2, Cord Venous 06/02/2024 <40.5 (H)  21.0 - 31.0 mm Hg Final    Base Excess, Cord Venous 06/02/2024 -2.5 (L)  -2.0 - 2.0 mmol/L Final    HCO3, Cord Venous 06/02/2024 20.9  mmol/L Final   Routine Prenatal on 05/29/2024   Component Date Value Ref Range Status    Glucose, UA 05/29/2024 Negative  Negative mg/dL Final     Protein, POC 05/29/2024 Trace (A)  Negative mg/dL Corrected   Routine Prenatal on 05/23/2024   Component Date Value Ref Range Status    Glucose, UA 05/23/2024 Negative  Negative mg/dL Final    Protein, POC 05/23/2024 Negative  Negative mg/dL Final   Routine Prenatal on 05/15/2024   Component Date Value Ref Range Status    Glucose, UA 05/15/2024 Negative  Negative mg/dL Final    Protein, POC 05/15/2024 Negative  Negative mg/dL Final    Group B Strep Culture 05/15/2024 No Group B Streptococcus isolated   Final   Admission on 05/07/2024, Discharged on 05/07/2024   Component Date Value Ref Range Status    SARS Antigen 05/07/2024 Not Detected  Not Detected, Presumptive Negative Final    Influenza A Antigen DANNY 05/07/2024 Not Detected  Not Detected Final    Influenza B Antigen DANNY 05/07/2024 Not Detected  Not Detected Final    Internal Control 05/07/2024 Passed  Passed Final    Lot Number 05/07/2024 [id number]   Final    Expiration Date 05/07/2024 1/16/25   Final    Rapid Strep A Screen 05/07/2024 Negative   Final    Internal Control 05/07/2024 Passed   Final    Lot Number 05/07/2024 [id number]   Final    Expiration Date 05/07/2024 2/27/25   Final    COVID19 05/07/2024 Detected (C)  Not Detected - Ref. Range Final    Throat Culture, Beta Strep 05/07/2024 No Beta Hemolytic Streptococcus Isolated   Final   Routine Prenatal on 05/01/2024   Component Date Value Ref Range Status    Glucose, UA 05/01/2024 Negative  Negative mg/dL Final    Protein, POC 05/01/2024 Negative  Negative mg/dL Final   Routine Prenatal on 04/17/2024   Component Date Value Ref Range Status    Glucose, UA 04/17/2024 Negative  Negative mg/dL Final    Protein, POC 04/17/2024 Negative  Negative mg/dL Final   Routine Prenatal on 04/03/2024   Component Date Value Ref Range Status    Glucose, UA 04/03/2024 Negative  Negative mg/dL Final    Protein, POC 04/03/2024 Negative  Negative mg/dL Final   Routine Prenatal on 03/18/2024   Component Date Value Ref Range  Status    Glucose, UA 03/18/2024 Negative  Negative mg/dL Final    Protein, POC 03/18/2024 Negative  Negative mg/dL Final    Glucose, UA 03/18/2024 Negative  Negative mg/dL Final    Bilirubin 03/18/2024 Negative  Negative Final    Ketones, UA 03/18/2024 Negative  Negative Final    Specific Gravity  03/18/2024 1.020  1.005 - 1.030 Final    Blood, UA 03/18/2024 Negative  Negative Final    pH, Urine 03/18/2024 5.0  5.0 - 8.0 Final    Protein, POC 03/18/2024 Negative  Negative mg/dL Final    Urobilinogen, UA 03/18/2024 Normal  Normal, 0.2 E.U./dL Final    Leukocytes 03/18/2024 75 Ana/ul (A)  Negative Final    Nitrite, UA 03/18/2024 Negative  Negative Final    Urine Culture 03/18/2024 25,000 CFU/mL Normal Urogenital Velvet   Final   There may be more visits with results that are not included.     EKG Results:  No orders to display     Imaging Results:  No Images in the past 120 days found.    ASSESSMENT AND PLAN:    ICD-10-CM ICD-9-CM   1. GEOVANNI (generalized anxiety disorder)  F41.1 300.02   2. Major depressive disorder, recurrent episode, moderate  F33.1 296.32   3. Panic disorder  F41.0 300.01     23 y.o. female who presents today for follow-up regarding MDD, GEOVANNI, panic. We have discussed the interval history and the treatment plan below, including potential R/B/SE of the recommended regimen of which the patient demonstrates understanding. Patient is agreeable to call 911 or go to the nearest ER should she become concerned for her own safety and/or the safety of those around her. There are no overt indices of acute jaye/psychosis on exam today.     Medication regimen: begin escitalopram 10 mg QD in place of sertraline; patient is advised not to misuse prescribed medications or to use them with any exogenous substances that aren't disclosed to this provider as they may interact with the regimen to the patient's detriment.   Risk Assessment: protracted risk is low, imminent risk is low - no interval change. Do note that  this is subject to change with the Yazidi of new stressors, treatment non-adherence, use of substances, and/or new medical ails.   Monitoring: reviewed labs/imaging as populated above  Therapy: referred  Follow-up: 6 weeks  Communications: N/A    TREATMENT PLAN/GOALS: challenge patterns of living conducive to symptom burden, implement recommended regimen as above with augmentative, intermittent supportive psychotherapy to reduce symptom burden. Patient acknowledged and verbally consented to continue treatment. The importance of adherence to the recommended treatment and interval follow-up appointments was again emphasized today: patient has good treatment adherence per given history. Patient was today reminded to limit daily caffeine intake, hydrate appropriately, eat healthy and nutritious foods, engage sleep hygiene measures, engage appropriate exposure to sunlight, engage with hobbies in balance with life necessities, and exercise appropriate to their capacity to do so.     Billing: This encounter is of moderate complexity based on number/complexity of problems addressed today and risk of complications/morbidity: 2+ stable chronic illnesses and prescription management.     Parts of this note are electronic transcriptions/translations of spoken language to printed text using the Dragon Dictation system.    Electronically signed by Silvano Odom MD, 07/16/24, [id number]

## 2024-07-24 ENCOUNTER — TELEPHONE (OUTPATIENT)
Dept: OBSTETRICS AND GYNECOLOGY | Facility: CLINIC | Age: 24
End: 2024-07-24

## 2024-07-24 NOTE — TELEPHONE ENCOUNTER
Caller: Ana Paula Amanda    Relationship: Self    Best call back number: [phone]    Who are you requesting to speak with (clinical staff, DR. CHAUDHRY    What was the call regarding: PATIENT CALLED TO SEE IF HER INSURANCE WOULD APPROVE AN IUD.  PLEASE ADVISE.

## 2024-08-08 ENCOUNTER — OFFICE VISIT (OUTPATIENT)
Dept: CARDIOLOGY | Facility: CLINIC | Age: 24
End: 2024-08-08
Payer: COMMERCIAL

## 2024-08-08 VITALS
DIASTOLIC BLOOD PRESSURE: 78 MMHG | BODY MASS INDEX: 23.21 KG/M2 | HEART RATE: 77 BPM | HEIGHT: 66 IN | SYSTOLIC BLOOD PRESSURE: 122 MMHG | WEIGHT: 144.4 LBS

## 2024-08-08 DIAGNOSIS — R00.2 PALPITATIONS: ICD-10-CM

## 2024-08-08 DIAGNOSIS — R07.9 CHEST PAIN, UNSPECIFIED TYPE: ICD-10-CM

## 2024-08-08 DIAGNOSIS — G90.A POTS (POSTURAL ORTHOSTATIC TACHYCARDIA SYNDROME): Primary | ICD-10-CM

## 2024-08-08 PROCEDURE — 99214 OFFICE O/P EST MOD 30 MIN: CPT | Performed by: INTERNAL MEDICINE

## 2024-08-08 RX ORDER — MIDODRINE HYDROCHLORIDE 5 MG/1
5 TABLET ORAL
Qty: 90 TABLET | Refills: 6 | Status: SHIPPED | OUTPATIENT
Start: 2024-08-08

## 2024-08-08 RX ORDER — PROPRANOLOL HYDROCHLORIDE 10 MG/1
10 TABLET ORAL 2 TIMES DAILY
Qty: 60 TABLET | Refills: 6 | Status: SHIPPED | OUTPATIENT
Start: 2024-08-08

## 2024-08-08 NOTE — PROGRESS NOTES
Chief Complaint  POTS (postural orthostatic tachycardia syndrome) and Palpitations (6m Follow Up)    Subjective        Ana Paula Amanda presents to [facility] CARDIOLOGY  History of present illness:    Patient states she had the baby 6/2/2024 and did fine.  She is not breast-feeding.  She does still note palpitations and also notes dizziness when she goes from sitting to standing.  She does note that this does affect her some days worse than others.  She is drinking lots of water along with using liberal salt.  She is avoiding caffeine.      Past Medical History:   Diagnosis Date    Anxiety     Chronic hypertension affecting pregnancy 11/21/2023    Depression     Heart valve disease     Noted on previous echo    Lactose intolerance     Orthostatic hypotension     POTS (postural orthostatic tachycardia syndrome)     Tachycardia          Past Surgical History:   Procedure Laterality Date    MANDIBLE SURGERY  2020    Reconstructive Jaw Surgery          Social History     Socioeconomic History    Marital status:    Tobacco Use    Smoking status: Never     Passive exposure: Never    Smokeless tobacco: Never   Vaping Use    Vaping status: Never Used   Substance and Sexual Activity    Alcohol use: Not Currently     Comment: socially    Drug use: Never    Sexual activity: Yes     Partners: Male     Birth control/protection: None         Family History   Problem Relation Age of Onset    OCD Mother     Depression Mother     Anxiety disorder Mother     Heart disease Mother     Hyperlipidemia Mother     Depression Father     Hyperlipidemia Father     Hypertension Father     No Known Problems Sister     Drug abuse Brother     ADD / ADHD Brother     No Known Problems Maternal Aunt     Clotting disorder Paternal Aunt     No Known Problems Maternal Uncle     No Known Problems Paternal Uncle     Heart disease Maternal Grandfather     Melanoma Maternal Grandfather     Heart disease Maternal Grandmother      "Lung cancer Maternal Grandmother     Stroke Paternal Grandfather     Heart disease Paternal Grandmother     Hypertension Paternal Grandmother     Clotting disorder Paternal Grandmother     Heart attack Paternal Grandmother     No Known Problems Cousin     No Known Problems Other     Breast cancer Neg Hx     Ovarian cancer Neg Hx     Uterine cancer Neg Hx     Colon cancer Neg Hx     Deep vein thrombosis Neg Hx     Pulmonary embolism Neg Hx     Alcohol abuse Neg Hx     Bipolar disorder Neg Hx     Dementia Neg Hx     Paranoid behavior Neg Hx     Schizophrenia Neg Hx     Seizures Neg Hx     Self-Injurious Behavior  Neg Hx     Suicide Attempts Neg Hx           Allergies   Allergen Reactions    Lactose Intolerance (Gi) GI Intolerance            Current Outpatient Medications:     escitalopram (Lexapro) 10 MG tablet, Take 1 tablet by mouth Daily., Disp: 30 tablet, Rfl: 1    midodrine (PROAMATINE) 5 MG tablet, Take 1 tablet by mouth 3 (Three) Times a Day Before Meals., Disp: 90 tablet, Rfl: 6    propranolol (INDERAL) 10 MG tablet, Take 1 tablet by mouth 2 (Two) Times a Day., Disp: 60 tablet, Rfl: 6      ROS:  Cardiac review of systems positive for lightheadedness and palpitations.    Objective     /78   Pulse 77   Ht 167.6 cm (66\")   Wt 65.5 kg (144 lb 6.4 oz)   BMI 23.31 kg/m²       General Appearance:   well developed  well nourished  HENT:   oropharynx moist  lips not cyanotic  Respiratory:  no respiratory distress  normal breath sounds  no rales  Cardiovascular:  no jugular venous distention  regular rhythm  S1 normal, S2 normal  no S3, no S4   no murmur  no rub, no thrill  No carotid bruit  pedal pulses normal  lower extremity edema: none    Musculoskeletal:  no clubbing of fingers.   normocephalic, head atraumatic  Skin:   warm, dry  Psychiatric:  judgement and insight appropriate  normal mood and affect    ECHO:  Results for orders placed during the hospital encounter of 09/12/23    Adult Transthoracic Echo " Complete W/ Cont if Necessary Per Protocol    Interpretation Summary    Left ventricular systolic function is hyperdynamic (EF > 70%). Calculated left ventricular EF = 70.1%    Left ventricular diastolic function was normal.    STRESS:    CATH:  No results found for this or any previous visit.    BMP:     Glucose   Date Value Ref Range Status   06/02/2024 80 65 - 99 mg/dL Final     BUN   Date Value Ref Range Status   06/02/2024 9 6 - 20 mg/dL Final     Creatinine   Date Value Ref Range Status   06/02/2024 0.58 0.57 - 1.00 mg/dL Final     Sodium   Date Value Ref Range Status   06/02/2024 138 136 - 145 mmol/L Final     Potassium   Date Value Ref Range Status   06/02/2024 3.9 3.5 - 5.2 mmol/L Final     Chloride   Date Value Ref Range Status   06/02/2024 105 98 - 107 mmol/L Final     CO2   Date Value Ref Range Status   06/02/2024 20.3 (L) 22.0 - 29.0 mmol/L Final     Calcium   Date Value Ref Range Status   06/02/2024 8.7 8.6 - 10.5 mg/dL Final     BUN/Creatinine Ratio   Date Value Ref Range Status   06/02/2024 15.5 7.0 - 25.0 Final     Anion Gap   Date Value Ref Range Status   06/02/2024 12.7 5.0 - 15.0 mmol/L Final     eGFR   Date Value Ref Range Status   06/02/2024 129.0 >60.0 mL/min/1.73 Final     LIPIDS:  Total Cholesterol   Date Value Ref Range Status   09/12/2023 181 0 - 200 mg/dL Final     Triglycerides   Date Value Ref Range Status   09/12/2023 76 0 - 150 mg/dL Final     HDL Cholesterol   Date Value Ref Range Status   09/12/2023 61 (H) 40 - 60 mg/dL Final     LDL Cholesterol    Date Value Ref Range Status   09/12/2023 106 (H) 0 - 100 mg/dL Final     VLDL Cholesterol   Date Value Ref Range Status   09/12/2023 14 5 - 40 mg/dL Final     LDL/HDL Ratio   Date Value Ref Range Status   09/12/2023 1.72  Final         Procedures             ASSESSMENT:  Diagnoses and all orders for this visit:    1. POTS (postural orthostatic tachycardia syndrome) (Primary)    2. Palpitations    3. Chest pain, unspecified type    Other  orders  -     propranolol (INDERAL) 10 MG tablet; Take 1 tablet by mouth 2 (Two) Times a Day.  Dispense: 60 tablet; Refill: 6  -     midodrine (PROAMATINE) 5 MG tablet; Take 1 tablet by mouth 3 (Three) Times a Day Before Meals.  Dispense: 90 tablet; Refill: 6         PLAN:    1.  Patient had a monitor on for 7 days.  When she did push the button she was mainly in sinus tachycardia.  There were no significant abnormal heart rhythms.  2.  Patient was diagnosed with pots disease by a tilt table test when she was 14 years old.  She was on metoprolol and Florinef and it overall did pretty well but when she became pregnant they took her off the Florinef and switched her metoprolol to labetalol which made her very dizzy.  3.  The patient is not breast-feeding.  She is in agreement to try low-dose propranolol and midodrine.  We will have her stop back in 2 weeks for repeat blood pressure and let us know how she is doing.  At that time if needed we will titrate up.  4.  Encouraged the patient to continue to drink plenty of water and limit caffeine.  We also asked her to continue to use liberal salt.  5.  Patient had cholesterol checked 9/2023 with , HDL 61, and triglycerides 76.  These are under excellent control.      Return in about 6 weeks (around 9/19/2024) for yasmin patterson.     Patient was given instructions and counseling regarding her condition or for health maintenance advice. Please see specific information pulled into the AVS if appropriate.         Breezy Grullon MD   8/8/2024  12:31 EDT

## 2024-08-15 ENCOUNTER — TELEPHONE (OUTPATIENT)
Dept: OBSTETRICS AND GYNECOLOGY | Facility: CLINIC | Age: 24
End: 2024-08-15
Payer: COMMERCIAL

## 2024-08-16 ENCOUNTER — TELEPHONE (OUTPATIENT)
Dept: OBSTETRICS AND GYNECOLOGY | Facility: CLINIC | Age: 24
End: 2024-08-16
Payer: COMMERCIAL

## 2024-08-16 NOTE — TELEPHONE ENCOUNTER
[id number]: need to schedule iud insert with dr andres. Beta day before. No intercourse 2 wks prior.  
Caller: Ana Paula Amanda    Relationship to patient: Self    Best call back number: [phone]    Chief complaint: IUD INSERTION    Type of visit: IUD INSERTION    Requested date: SEE PREV MESSAGE     If rescheduling, when is the original appointment: NA     Additional notes:PLEASE CALL PT TO SCHEDULE       
Yes - the patient is able to be screened

## 2024-08-20 ENCOUNTER — OFFICE VISIT (OUTPATIENT)
Dept: CARDIOLOGY | Facility: CLINIC | Age: 24
End: 2024-08-20
Payer: COMMERCIAL

## 2024-08-20 ENCOUNTER — TELEPHONE (OUTPATIENT)
Dept: CARDIOLOGY | Facility: CLINIC | Age: 24
End: 2024-08-20

## 2024-08-20 DIAGNOSIS — G90.A POTS (POSTURAL ORTHOSTATIC TACHYCARDIA SYNDROME): Primary | ICD-10-CM

## 2024-08-20 NOTE — PROGRESS NOTES
Patient presented to the office for blood pressure check only.  She was not seen or examined by the provider.  Patient's blood pressure is better controlled with the midodrine.  Her symptoms are improved on midodrine and propranolol.  Continue current medications.

## 2024-08-20 NOTE — TELEPHONE ENCOUNTER
Pt came in for a 2 week bp check.  She did not have any results with her.  She stated she has not been taking it at home.    1st reading:    /71, HR 71    Did not take any other readings.    Pt stated she feels she is doing well on meds Dr. Grullon prescribed, Propranolol & Midodrine.

## 2024-08-27 ENCOUNTER — TELEMEDICINE (OUTPATIENT)
Dept: PSYCHIATRY | Facility: CLINIC | Age: 24
End: 2024-08-27
Payer: COMMERCIAL

## 2024-08-27 DIAGNOSIS — F33.1 MAJOR DEPRESSIVE DISORDER, RECURRENT EPISODE, MODERATE: Primary | ICD-10-CM

## 2024-08-27 DIAGNOSIS — F41.0 PANIC DISORDER: ICD-10-CM

## 2024-08-27 DIAGNOSIS — F41.1 GAD (GENERALIZED ANXIETY DISORDER): ICD-10-CM

## 2024-08-27 PROCEDURE — 99214 OFFICE O/P EST MOD 30 MIN: CPT | Performed by: PSYCHIATRY & NEUROLOGY

## 2024-08-27 RX ORDER — BUPROPION HYDROCHLORIDE 150 MG/1
150 TABLET ORAL EVERY MORNING
Qty: 30 TABLET | Refills: 1 | Status: SHIPPED | OUTPATIENT
Start: 2024-08-27

## 2024-08-27 NOTE — PROGRESS NOTES
"[facility]  Follow-up Visit    Chief Complaint: \"I've struggled with anxiety and depression for basically as long as I can remember... 2 years ago my brother  of an overdose... basically since then everything has gotten worse.\"     History of Present Illness: Ana Paula Amanda is a 23 y.o. female who presents today for follow-up regarding MDD, GEOVANNI, panic. Last seen:  at which time sertraline was replaced with escitalopram. She presents accompanied by her infant in no acute distress and engages with me appropriately. Services today rendered via telehealth ([employer]) for which the patient provided informed consent. Patient is aware she can refuse to be seen remotely at any time. Patient was located in a secure, remote environment (at home) as was the provider (in-office). I confirmed the patient's identity prior to evaluation and reiterated my credentials; there were no technical issues during the session today. Psychotropic regimen perceived to be partially effective. Side-effects per given history: headaches.    Current treatment regimen includes:   - escitalopram 10 mg QD (has stopped taking this)    Today Ana Paula reports she's doing fairly. Depressive symptoms are inadequately managed with current interventions; she'd like to primarily target mood symptoms today as these are the most salient issue. Anxious symptoms are partially managed with current interventions; she feels propranolol has been helpful in this regard. Panic symptoms are partially managed with current interventions. Thought process and content are devoid of overt aberration suggestive of acute jaye/psychosis. Speech pattern consistent with former exam. The patient denies SI/HI/AVH.   - contextual changes: started her new job and this is going well thus far; has started propranolol and feels this has helped with anxiety; 's new job continues to go well; meets with Roselyn for therapy this " afternoon; brother passed in September, she anticipates this will likely contribute to symptom burden to some degree (plans to engage in a memorial with family)  - sleep: maintenance is disrupted for feeding (bottle-feeding)  - appetite: variable    I have counseled the patient with regard to diagnoses and the recommended treatment regimen as documented below: I will be prescribing bupropion for MDD. The patient has been made aware that this agent diminishes the seizure threshold and can increase risk for seizures in susceptible populations. More common SE - dry mouth, GI upset, insomnia, hypertension - have also been reviewed. I've advised antidepressants carry a possibility of exacerbating mood/anxiety issues for which they are prescribed to the degree that, in some cases, their use may lead to acute suicidality. Patient contracts for safety appropriately. Patient demonstrates understanding of potential risks/benefits/side effects associated with this regimen and is amenable to proceed in this fashion.     Psychotherapy  - Time: 9 minutes  - interventions employed: the therapeutic alliance was strengthened to encourage the patient to express their thoughts and feelings freely. Esteem building was enhanced through praise, reassurance, normalizing/challenging, and encouragement as appropriate. Coping skills were enhanced to build distress tolerance skills and emotional regulation. Allowed patient to freely discuss issues without interruption or judgement with unconditional positive regard, active listening skills, and empathy. Provided a safe, confidential environment to facilitate the development of a positive therapeutic relationship and encourage open, honest communication. Assisted patient in processing session content; acknowledged and normalized/addressed, as appropriate, patient’s thoughts, feelings, and concerns by utilizing a person-centered approach in efforts to build appropriate rapport and a positive  "therapeutic relationship.   - Diagnoses: see assessment and plan below  - Symptoms: see subjective above  - Goals   - patient: improve mood, mitigate anxiety, bolster functional capacity   - provider: challenge patterns of living conducive to pathology, strengthen defenses, promote problems solving, restore adaptive functioning and provide symptom relief.  - Treatment plan: continue supportive psychotherapy in subsequent appointments to provide symptom relief; see assessment and plan below for additional details:   - iteration: 1   - progress: partial   - (X)illumination, (X)contextualization, (working)detection, (working)development, (-)elaboration, (-)refinement  - functional status: fair  - mental status exam: as below  - prognosis: good    Psychiatric History:  Diagnoses: depression and anxiety  Outpatient history: denies  Inpatient history: denies  Medication trials: hydroxyzine, trazodone, lorazepam (was helpful), sertraline (GI distress), fluoxetine, escitalopram (headaches)  Other treatment modalities: has done some therapy in the past  Presenting regimen: N/A  Self harm: denies  Suicide attempts: denies     Substance Abuse History:   Types/methods/frequency: marijuana in high school, not since     Social History:  Residence: lives in an apartment with her  (2023), 3 YO daughter, and son (Eduardo, born 2024)  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  Education: RN + NP  Pertinent developmental history: denies; denies abuse hx  Pertinent legal history: denies  Hobbies/interests: chayo, reading  Bahai: \"in a gray space\"; raised Orthodox (non-Adventism)  Exercise: denies  Dietary habits: defer  Sleep hygiene: defer  Social habits: no pertinent issues  Sunlight: no concern for under-exposure  Caffeine intake: no pertinent issues; 1 cup of coffee in the morning, no tea, occasional soda, rare energy drink  Hydration habits: no pertinent issues   history: denies    Social History "     Socioeconomic History    Marital status:    Tobacco Use    Smoking status: Never     Passive exposure: Never    Smokeless tobacco: Never   Vaping Use    Vaping status: Never Used   Substance and Sexual Activity    Alcohol use: Not Currently     Comment: socially    Drug use: Never    Sexual activity: Yes     Partners: Male     Birth control/protection: None     Tobacco use counseling/intervention: N/A, patient does not use tobacco; patient has been counseled with regard to risks of tobacco use.    PHQ-9 Depression Screening  PHQ-9 Total Score:      Little interest or pleasure in doing things?     Feeling down, depressed, or hopeless?     Trouble falling or staying asleep, or sleeping too much?     Feeling tired or having little energy?     Poor appetite or overeating?     Feeling bad about yourself - or that you are a failure or have let yourself or your family down?     Trouble concentrating on things, such as reading the newspaper or watching television?     Moving or speaking so slowly that other people could have noticed? Or the opposite - being so fidgety or restless that you have been moving around a lot more than usual?     Thoughts that you would be better off dead, or of hurting yourself in some way?     PHQ-9 Total Score       Change in PHQ-9 since last measure: N/A (13)    GEOVANNI-7       Change in GEOVANNI-7 since last measure: N/A (19)    Problem List:  Patient Active Problem List   Diagnosis    Irritable bowel syndrome with both constipation and diarrhea    GEOVANNI (generalized anxiety disorder)    Major depressive disorder, recurrent episode, moderate    Panic disorder     Allergy:   Allergies   Allergen Reactions    Lactose Intolerance (Gi) GI Intolerance      Discontinued Medications:  Medications Discontinued During This Encounter   Medication Reason    escitalopram (Lexapro) 10 MG tablet        Current Medications:   Current Outpatient Medications   Medication Sig Dispense Refill    buPROPion XL  (Wellbutrin XL) 150 MG 24 hr tablet Take 1 tablet by mouth Every Morning. 30 tablet 1    midodrine (PROAMATINE) 5 MG tablet Take 1 tablet by mouth 3 (Three) Times a Day Before Meals. 90 tablet 6    propranolol (INDERAL) 10 MG tablet Take 1 tablet by mouth 2 (Two) Times a Day. 60 tablet 6     No current facility-administered medications for this visit.     Past Medical History:  Past Medical History:   Diagnosis Date    Anxiety     Chronic hypertension affecting pregnancy 11/21/2023    Depression     Heart valve disease     Noted on previous echo    Lactose intolerance     Orthostatic hypotension     POTS (postural orthostatic tachycardia syndrome)     Tachycardia      Past Surgical History:  Past Surgical History:   Procedure Laterality Date    MANDIBLE SURGERY  2020    Reconstructive Jaw Surgery     Mental Status Exam:   Appearance: well-groomed, sits upright, age-appropriate, normal habitus  Behavior: calm, cooperative, appropriate in demeanor, appropriate eye-contact  Mood/affect: dysphoric / mood-congruent, but appropriate in both range and amplitude  Speech: within expected variance; appropriate rate, appropriate rhythm, appropriate tone; non-pressured  Thought Process: linear, goal-directed; no FOI or JOAN; abstraction intact  Thought Content: coherent, devoid of overt delusions/perceptual disturbances  SI/HI: denies both SI and HI; exhibits future-orientation, self-advocates appropriately, no regular self-harm, no appreciable intent  Memory: no overt deficits  Orientation: oriented to person/place/time/situation  Concentration: appropriate during interview  Intellectual capacity: presumptively average  Insight: fair by given history/exam  Judgment: appropriate by given history/exam  Psychomotor: no appreciable latency/retardation/agitation/tremor  Gait: deferred    Vital Signs:   There were no vitals taken for this visit.     Lab Results:   Postpartum Visit on 07/10/2024   Component Date Value Ref Range Status     GARDNERELLA VAGINALIS 07/10/2024 Positive (A)  Negative Final    TRICHOMONAS VAGINALIS 07/10/2024 Negative  Negative Final    ABEL SPECIES 07/10/2024 Negative  Negative Final   Admission on 06/02/2024, Discharged on 06/03/2024   Component Date Value Ref Range Status    WBC 06/02/2024 9.21  3.40 - 10.80 10*3/mm3 Final    RBC 06/02/2024 4.33  3.77 - 5.28 10*6/mm3 Final    Hemoglobin 06/02/2024 10.7 (L)  12.0 - 15.9 g/dL Final    Hematocrit 06/02/2024 34.2  34.0 - 46.6 % Final    MCV 06/02/2024 79.0  79.0 - 97.0 fL Final    MCH 06/02/2024 24.7 (L)  26.6 - 33.0 pg Final    MCHC 06/02/2024 31.3 (L)  31.5 - 35.7 g/dL Final    RDW 06/02/2024 14.4  12.3 - 15.4 % Final    RDW-SD 06/02/2024 40.8  37.0 - 54.0 fl Final    MPV 06/02/2024    Final    Test unavailable due to sample interference    Platelets 06/02/2024 210  140 - 450 10*3/mm3 Final    Glucose 06/02/2024 80  65 - 99 mg/dL Final    BUN 06/02/2024 9  6 - 20 mg/dL Final    Creatinine 06/02/2024 0.58  0.57 - 1.00 mg/dL Final    Sodium 06/02/2024 138  136 - 145 mmol/L Final    Potassium 06/02/2024 3.9  3.5 - 5.2 mmol/L Final    Chloride 06/02/2024 105  98 - 107 mmol/L Final    CO2 06/02/2024 20.3 (L)  22.0 - 29.0 mmol/L Final    Calcium 06/02/2024 8.7  8.6 - 10.5 mg/dL Final    Total Protein 06/02/2024 6.5  6.0 - 8.5 g/dL Final    Albumin 06/02/2024 3.7  3.5 - 5.2 g/dL Final    ALT (SGPT) 06/02/2024 8  1 - 33 U/L Final    AST (SGOT) 06/02/2024 14  1 - 32 U/L Final    Alkaline Phosphatase 06/02/2024 194 (H)  39 - 117 U/L Final    Total Bilirubin 06/02/2024 0.3  0.0 - 1.2 mg/dL Final    Globulin 06/02/2024 2.8  gm/dL Final    A/G Ratio 06/02/2024 1.3  g/dL Final    BUN/Creatinine Ratio 06/02/2024 15.5  7.0 - 25.0 Final    Anion Gap 06/02/2024 12.7  5.0 - 15.0 mmol/L Final    eGFR 06/02/2024 129.0  >60.0 mL/min/1.73 Final    Bile Acids Total 06/02/2024 6  0 - 10 umol/L Final    Color 06/02/2024 Yellow  Yellow, Straw, Dark Yellow, Christelle Final    Clarity, UA 06/02/2024  Clear  Clear Final    Glucose, UA 06/02/2024 Negative  Negative mg/dL Final    Bilirubin 06/02/2024 Negative  Negative Final    Ketones, UA 06/02/2024 Negative  Negative Final    Specific Gravity  06/02/2024 1.015  1.005 - 1.030 Final    Blood, UA 06/02/2024 Negative  Negative Final    pH, Urine 06/02/2024 6.5  5.0 - 8.0 Final    Protein, POC 06/02/2024 Negative  Negative mg/dL Final    Urobilinogen, UA 06/02/2024 Normal  Normal, 0.2 E.U./dL Final    Leukocytes 06/02/2024 Trace (A)  Negative Final    Nitrite, UA 06/02/2024 Negative  Negative Final    Treponemal AB Total 06/02/2024 Non-Reactive  Non-Reactive Final    Amphet/Methamphet, Screen 06/02/2024 Negative  Negative Final    Barbiturates Screen, Urine 06/02/2024 Negative  Negative Final    Benzodiazepine Screen, Urine 06/02/2024 Negative  Negative Final    Cocaine Screen, Urine 06/02/2024 Negative  Negative Final    Opiate Screen 06/02/2024 Negative  Negative Final    THC, Screen, Urine 06/02/2024 Negative  Negative Final    Methadone Screen, Urine 06/02/2024 Negative  Negative Final    Oxycodone Screen, Urine 06/02/2024 Negative  Negative Final    Fentanyl, Urine 06/02/2024 Negative  Negative Final    ABO Type 06/02/2024 O   Final    RH type 06/02/2024 Positive   Final    Antibody Screen 06/02/2024 Negative   Final    T&S Expiration Date 06/02/2024 6/5/2024 11:59:59 PM   Final    Creatinine, Urine 06/02/2024 67.6  mg/dL Final    Total Protein, Urine 06/02/2024 17.0  mg/dL Final    Protein/Creatinine Ratio, Urine 06/02/2024 0.25   Final    pH, Cord Arterial 06/02/2024 7.35 (H)  7.21 - 7.31 pH Units Final    pCO2, Cord Arterial 06/02/2024 39.0  33.0 - 49.0 mmHg Final    Base Exc, Cord Arterial 06/02/2024 -3.9 (L)  -2.0 - 2.0 mmol/L Final    pO2, Cord Arterial 06/02/2024 <40.5  mmHg Final    HCO3, Cord Arterial 06/02/2024 21.2  mmol/L Final    pH, Cord Venous 06/02/2024 7.425 (H)  7.310 - 7.370 pH Units Final    pCO2, Cord Venous 06/02/2024 32.6  28.0 - 40.0 mm  Hg Final    pO2, Cord Venous 06/02/2024 <40.5 (H)  21.0 - 31.0 mm Hg Final    Base Excess, Cord Venous 06/02/2024 -2.5 (L)  -2.0 - 2.0 mmol/L Final    HCO3, Cord Venous 06/02/2024 20.9  mmol/L Final   Routine Prenatal on 05/29/2024   Component Date Value Ref Range Status    Glucose, UA 05/29/2024 Negative  Negative mg/dL Final    Protein, POC 05/29/2024 Trace (A)  Negative mg/dL Corrected   Routine Prenatal on 05/23/2024   Component Date Value Ref Range Status    Glucose, UA 05/23/2024 Negative  Negative mg/dL Final    Protein, POC 05/23/2024 Negative  Negative mg/dL Final   Routine Prenatal on 05/15/2024   Component Date Value Ref Range Status    Glucose, UA 05/15/2024 Negative  Negative mg/dL Final    Protein, POC 05/15/2024 Negative  Negative mg/dL Final    Group B Strep Culture 05/15/2024 No Group B Streptococcus isolated   Final   Admission on 05/07/2024, Discharged on 05/07/2024   Component Date Value Ref Range Status    SARS Antigen 05/07/2024 Not Detected  Not Detected, Presumptive Negative Final    Influenza A Antigen DANNY 05/07/2024 Not Detected  Not Detected Final    Influenza B Antigen DANNY 05/07/2024 Not Detected  Not Detected Final    Internal Control 05/07/2024 Passed  Passed Final    Lot Number 05/07/2024 [id number]   Final    Expiration Date 05/07/2024 1/16/25   Final    Rapid Strep A Screen 05/07/2024 Negative   Final    Internal Control 05/07/2024 Passed   Final    Lot Number 05/07/2024 [id number]   Final    Expiration Date 05/07/2024 2/27/25   Final    COVID19 05/07/2024 Detected (C)  Not Detected - Ref. Range Final    Throat Culture, Beta Strep 05/07/2024 No Beta Hemolytic Streptococcus Isolated   Final   Routine Prenatal on 05/01/2024   Component Date Value Ref Range Status    Glucose, UA 05/01/2024 Negative  Negative mg/dL Final    Protein, POC 05/01/2024 Negative  Negative mg/dL Final   Routine Prenatal on 04/17/2024   Component Date Value Ref Range Status    Glucose, UA 04/17/2024 Negative   Negative mg/dL Final    Protein, POC 04/17/2024 Negative  Negative mg/dL Final   Routine Prenatal on 04/03/2024   Component Date Value Ref Range Status    Glucose, UA 04/03/2024 Negative  Negative mg/dL Final    Protein, POC 04/03/2024 Negative  Negative mg/dL Final   Routine Prenatal on 03/18/2024   Component Date Value Ref Range Status    Glucose, UA 03/18/2024 Negative  Negative mg/dL Final    Protein, POC 03/18/2024 Negative  Negative mg/dL Final    Glucose, UA 03/18/2024 Negative  Negative mg/dL Final    Bilirubin 03/18/2024 Negative  Negative Final    Ketones, UA 03/18/2024 Negative  Negative Final    Specific Gravity  03/18/2024 1.020  1.005 - 1.030 Final    Blood, UA 03/18/2024 Negative  Negative Final    pH, Urine 03/18/2024 5.0  5.0 - 8.0 Final    Protein, POC 03/18/2024 Negative  Negative mg/dL Final    Urobilinogen, UA 03/18/2024 Normal  Normal, 0.2 E.U./dL Final    Leukocytes 03/18/2024 75 Ana/ul (A)  Negative Final    Nitrite, UA 03/18/2024 Negative  Negative Final    Urine Culture 03/18/2024 25,000 CFU/mL Normal Urogenital Velvet   Final   There may be more visits with results that are not included.     EKG Results:  No orders to display     Imaging Results:  No Images in the past 120 days found.    ASSESSMENT AND PLAN:    ICD-10-CM ICD-9-CM   1. Major depressive disorder, recurrent episode, moderate  F33.1 296.32   2. GEOVANNI (generalized anxiety disorder)  F41.1 300.02   3. Panic disorder  F41.0 300.01     23 y.o. female who presents today for follow-up regarding MDD, GEOVANNI, panic. We have discussed the interval history and the treatment plan below, including potential R/B/SE of the recommended regimen of which the patient demonstrates understanding. Patient is agreeable to call 911 or go to the nearest ER should she become concerned for her own safety and/or the safety of those around her. There are no overt indices of acute jaye/psychosis on exam today.     Medication regimen: begin bupropion  mg  QAM in place of escitalopram; patient is advised not to misuse prescribed medications or to use them with any exogenous substances that aren't disclosed to this provider as they may interact with the regimen to the patient's detriment.   Risk assessment: protracted risk is low, imminent risk is low - no interval change. Do note that this is subject to change with the [place] of new stressors, treatment non-adherence, use of substances, and/or new medical ails.   Monitoring: reviewed labs/imaging as populated above  Therapy: referred  Follow-up: 6 weeks  Communications: N/A  Treatment plan: due    TREATMENT PLAN/GOALS: challenge patterns of living conducive to symptom burden, implement recommended regimen as above with augmentative, intermittent supportive psychotherapy to reduce symptom burden. Patient acknowledged and verbally consented to continue treatment. The importance of adherence to the recommended treatment and interval follow-up appointments was again emphasized today: patient has good treatment adherence per given history. Patient was today reminded to limit daily caffeine intake, hydrate appropriately, eat healthy and nutritious foods, engage sleep hygiene measures, engage appropriate exposure to sunlight, engage with hobbies in balance with life necessities, and exercise appropriate to their capacity to do so.     Billing: This encounter is of moderate complexity based on number/complexity of problems addressed today and risk of complications/morbidity: 2+ stable chronic illnesses and prescription management.     Parts of this note are electronic transcriptions/translations of spoken language to printed text using the Dragon Dictation system.    Electronically signed by Silvano Odom MD, 08/27/24, [id number]

## 2024-08-27 NOTE — TREATMENT PLAN
Multi-Disciplinary Problems (from Behavioral Health Treatment Plan)      Active Problems       Problem:  Patient Care Overview (Adult)  Start Date: 08/27/24      Problem Details: Treatment Planning for Ana Paula    Applicable diagnoses/problems:    - Depression: enhance motivation and interest with regard to ego-syntonic items of living via routine building and disruption of inhibitory executive cognitive circuits; challenge withdrawal from ego-syntonic items of living; cultivate radha and satisfaction via a consistent practice of appreciation; consistently practice redirection from intrusive ego-dystonic thoughts towards actionable items to reduce influence these thoughts have in emotions and behavior.  - progress: partial, progressing  - frequency of intervention: as needed  - duration of treatment: until optimized functional status is met    - Anxiety: enhance engagement with regard to ego-syntonic items of living via routine building and disruption of inhibitory executive cognitive circuits; challenge avoidance of ego-syntonic items of living via disruption to perceived barriers; reduce salience of fears and overwhelm with regard to their effects on thinking and behavior.  - progress: partial, progressing   - frequency of intervention: as needed  - duration of treatment: until optimized functional status is met    - Grief: emotionally process the absence of her brother and the associated impact in daily life towards a place of acceptance by revisiting events (planning memorial in coming weeks), expressing feelings, leaning on available supports (family and family friends), and by sublimating suffering towards constructive outcomes for herself  - progress: partial, progressing  - frequency of intervention: as needed  - duration of treatment: until optimized functional status is met        Goal Priority Start Date Expected End Date End Date    Plan of Care Review -- 08/27/24 02/25/25 --

## 2024-09-10 ENCOUNTER — TELEPHONE (OUTPATIENT)
Dept: CARDIOLOGY | Facility: CLINIC | Age: 24
End: 2024-09-10
Payer: COMMERCIAL

## 2024-09-13 ENCOUNTER — LAB (OUTPATIENT)
Dept: LAB | Facility: HOSPITAL | Age: 24
End: 2024-09-13
Payer: COMMERCIAL

## 2024-09-13 DIAGNOSIS — R19.7 DIARRHEA, UNSPECIFIED TYPE: ICD-10-CM

## 2024-09-13 DIAGNOSIS — R10.84 GENERALIZED ABDOMINAL PAIN: Primary | ICD-10-CM

## 2024-09-13 LAB
ALBUMIN SERPL-MCNC: 4.7 G/DL (ref 3.5–5.2)
ALBUMIN/GLOB SERPL: 1.6 G/DL
ALP SERPL-CCNC: 84 U/L (ref 39–117)
ALT SERPL W P-5'-P-CCNC: 26 U/L (ref 1–33)
AMYLASE SERPL-CCNC: 54 U/L (ref 28–100)
ANION GAP SERPL CALCULATED.3IONS-SCNC: 11.3 MMOL/L (ref 5–15)
AST SERPL-CCNC: 25 U/L (ref 1–32)
BILIRUB SERPL-MCNC: 0.3 MG/DL (ref 0–1.2)
BUN SERPL-MCNC: 12 MG/DL (ref 6–20)
BUN/CREAT SERPL: 14.6 (ref 7–25)
CALCIUM SPEC-SCNC: 9.8 MG/DL (ref 8.6–10.5)
CHLORIDE SERPL-SCNC: 104 MMOL/L (ref 98–107)
CO2 SERPL-SCNC: 24.7 MMOL/L (ref 22–29)
CREAT SERPL-MCNC: 0.82 MG/DL (ref 0.57–1)
DEPRECATED RDW RBC AUTO: 42.6 FL (ref 37–54)
EGFRCR SERPLBLD CKD-EPI 2021: 101.9 ML/MIN/1.73
ERYTHROCYTE [DISTWIDTH] IN BLOOD BY AUTOMATED COUNT: 14.5 % (ref 12.3–15.4)
GLOBULIN UR ELPH-MCNC: 3 GM/DL
GLUCOSE SERPL-MCNC: 92 MG/DL (ref 65–99)
H PYLORI IGG SER IA-ACNC: NEGATIVE
HCT VFR BLD AUTO: 40.1 % (ref 34–46.6)
HGB BLD-MCNC: 12.9 G/DL (ref 12–15.9)
LIPASE SERPL-CCNC: 27 U/L (ref 13–60)
MCH RBC QN AUTO: 26.5 PG (ref 26.6–33)
MCHC RBC AUTO-ENTMCNC: 32.2 G/DL (ref 31.5–35.7)
MCV RBC AUTO: 82.5 FL (ref 79–97)
PLATELET # BLD AUTO: 247 10*3/MM3 (ref 140–450)
PMV BLD AUTO: 14.5 FL (ref 6–12)
POTASSIUM SERPL-SCNC: 4.1 MMOL/L (ref 3.5–5.2)
PROT SERPL-MCNC: 7.7 G/DL (ref 6–8.5)
RBC # BLD AUTO: 4.86 10*6/MM3 (ref 3.77–5.28)
SODIUM SERPL-SCNC: 140 MMOL/L (ref 136–145)
WBC NRBC COR # BLD AUTO: 6.6 10*3/MM3 (ref 3.4–10.8)

## 2024-09-13 PROCEDURE — 83690 ASSAY OF LIPASE: CPT | Performed by: NURSE PRACTITIONER

## 2024-09-13 PROCEDURE — 36415 COLL VENOUS BLD VENIPUNCTURE: CPT | Performed by: NURSE PRACTITIONER

## 2024-09-13 PROCEDURE — 85027 COMPLETE CBC AUTOMATED: CPT | Performed by: NURSE PRACTITIONER

## 2024-09-13 PROCEDURE — 86677 HELICOBACTER PYLORI ANTIBODY: CPT | Performed by: NURSE PRACTITIONER

## 2024-09-13 PROCEDURE — 80053 COMPREHEN METABOLIC PANEL: CPT | Performed by: NURSE PRACTITIONER

## 2024-09-13 PROCEDURE — 82150 ASSAY OF AMYLASE: CPT | Performed by: NURSE PRACTITIONER

## 2024-09-13 RX ORDER — PANTOPRAZOLE SODIUM 20 MG/1
20 TABLET, DELAYED RELEASE ORAL DAILY
Qty: 30 TABLET | Refills: 0 | Status: SHIPPED | OUTPATIENT
Start: 2024-09-13

## 2024-09-13 RX ORDER — SUCRALFATE 1 G/1
TABLET ORAL
Qty: 120 TABLET | Refills: 0 | Status: SHIPPED | OUTPATIENT
Start: 2024-09-13

## 2024-09-17 DIAGNOSIS — R10.84 GENERALIZED ABDOMINAL PAIN: ICD-10-CM

## 2024-09-17 DIAGNOSIS — R19.7 DIARRHEA, UNSPECIFIED TYPE: ICD-10-CM

## 2024-09-19 ENCOUNTER — OFFICE VISIT (OUTPATIENT)
Dept: CARDIOLOGY | Facility: CLINIC | Age: 24
End: 2024-09-19
Payer: COMMERCIAL

## 2024-09-19 VITALS
DIASTOLIC BLOOD PRESSURE: 74 MMHG | HEIGHT: 66 IN | BODY MASS INDEX: 22.18 KG/M2 | WEIGHT: 138 LBS | SYSTOLIC BLOOD PRESSURE: 106 MMHG | HEART RATE: 80 BPM

## 2024-09-19 DIAGNOSIS — R00.2 PALPITATIONS: ICD-10-CM

## 2024-09-19 DIAGNOSIS — G90.A POTS (POSTURAL ORTHOSTATIC TACHYCARDIA SYNDROME): Primary | ICD-10-CM

## 2024-09-19 PROCEDURE — 99214 OFFICE O/P EST MOD 30 MIN: CPT | Performed by: NURSE PRACTITIONER

## 2024-09-23 ENCOUNTER — LAB (OUTPATIENT)
Dept: LAB | Facility: HOSPITAL | Age: 24
End: 2024-09-23
Payer: COMMERCIAL

## 2024-09-23 DIAGNOSIS — Z30.09 ENCOUNTER FOR COUNSELING REGARDING CONTRACEPTION: ICD-10-CM

## 2024-09-23 LAB — HCG INTACT+B SERPL-ACNC: <1 MIU/ML

## 2024-09-23 PROCEDURE — 84702 CHORIONIC GONADOTROPIN TEST: CPT

## 2024-09-23 PROCEDURE — 36415 COLL VENOUS BLD VENIPUNCTURE: CPT

## 2024-09-24 ENCOUNTER — PROCEDURE VISIT (OUTPATIENT)
Dept: OBSTETRICS AND GYNECOLOGY | Facility: CLINIC | Age: 24
End: 2024-09-24
Payer: COMMERCIAL

## 2024-09-24 VITALS
HEART RATE: 89 BPM | WEIGHT: 135 LBS | DIASTOLIC BLOOD PRESSURE: 75 MMHG | SYSTOLIC BLOOD PRESSURE: 123 MMHG | BODY MASS INDEX: 21.79 KG/M2

## 2024-09-24 DIAGNOSIS — Z30.430 ENCOUNTER FOR IUD INSERTION: Primary | ICD-10-CM

## 2024-09-24 PROCEDURE — 58300 INSERT INTRAUTERINE DEVICE: CPT | Performed by: OBSTETRICS & GYNECOLOGY

## 2024-10-01 ENCOUNTER — TELEMEDICINE (OUTPATIENT)
Dept: PSYCHIATRY | Facility: CLINIC | Age: 24
End: 2024-10-01
Payer: COMMERCIAL

## 2024-10-01 DIAGNOSIS — F41.1 GAD (GENERALIZED ANXIETY DISORDER): ICD-10-CM

## 2024-10-01 DIAGNOSIS — F33.1 MAJOR DEPRESSIVE DISORDER, RECURRENT EPISODE, MODERATE: Primary | ICD-10-CM

## 2024-10-01 DIAGNOSIS — F41.0 PANIC DISORDER: ICD-10-CM

## 2024-10-01 PROCEDURE — 99214 OFFICE O/P EST MOD 30 MIN: CPT | Performed by: PSYCHIATRY & NEUROLOGY

## 2024-10-01 RX ORDER — BUPROPION HYDROCHLORIDE 300 MG/1
300 TABLET ORAL EVERY MORNING
Qty: 30 TABLET | Refills: 1 | Status: SHIPPED | OUTPATIENT
Start: 2024-10-01

## 2024-10-01 NOTE — PROGRESS NOTES
"[facility]  Follow-up Visit    Chief Complaint: \"I've struggled with anxiety and depression for basically as long as I can remember... 2 years ago my brother  of an overdose... basically since then everything has gotten worse.\"     History of Present Illness: Ana Paula Amanda is a 23 y.o. female who presents today for follow-up regarding MDD, GEOVANNI, panic. Last seen:  at which time escitalopram was replaced with bupropion. She presents unaccompanied in no acute distress and engages with me appropriately. Services today rendered via telehealth ([employer]) for which the patient provided informed consent, provides this again today, 10/01. Patient is aware she can refuse to be seen remotely at any time. Patient was located in a secure, remote environment (at home) as was the provider (in-office). I confirmed the patient's identity prior to evaluation and reiterated my credentials; there were no technical issues during the session today. Psychotropic regimen perceived to be partially effective. Side-effects per given history: denies.    Current treatment regimen includes:   - bupropion  mg QAM  - via another provider: propranolol 10 mg BID    Today Ana Paula reports she's doing \"pretty good\". Depressive symptoms are better managed with current interventions; she'd like to optimize dose further as is feasible. Anxious symptoms are partially managed with current interventions. Panic symptoms are partially managed with current interventions. Thought process and content are devoid of overt aberration suggestive of acute jaye/psychosis. Speech pattern consistent with former exam. The patient denies SI/HI/AVH.   - contextual changes: her job is going well thus far, feels she's slowly getting her bearings and feeling less anxious about going into work; daughter is in speech therapy and seeing the associated improvements has been nice  - sleep: improved; maintenance is disrupted " for feeding (bottle-feeding)  - appetite: tolerably diminished; has gradually lost weight since giving birth    I have counseled the patient with regard to diagnoses and the recommended treatment regimen as documented below. Patient contracts for safety appropriately. Patient demonstrates understanding of potential risks/benefits/side effects associated with this regimen and is amenable to proceed in this fashion.     Psychotherapy  - Time: 5 minutes  - interventions employed: the therapeutic alliance was strengthened to encourage the patient to express their thoughts and feelings freely. Esteem building was enhanced through praise, reassurance, normalizing/challenging, and encouragement as appropriate. Coping skills were enhanced to build distress tolerance skills and emotional regulation. Allowed patient to freely discuss issues without interruption or judgement with unconditional positive regard, active listening skills, and empathy. Provided a safe, confidential environment to facilitate the development of a positive therapeutic relationship and encourage open, honest communication. Assisted patient in processing session content; acknowledged and normalized/addressed, as appropriate, patient’s thoughts, feelings, and concerns by utilizing a person-centered approach in efforts to build appropriate rapport and a positive therapeutic relationship.   - Diagnoses: see assessment and plan below  - Symptoms: see subjective above  - Goals   - patient: improve mood, mitigate anxiety, bolster functional capacity   - provider: challenge patterns of living conducive to pathology, strengthen defenses, promote problems solving, restore adaptive functioning and provide symptom relief.  - Treatment plan: continue supportive psychotherapy in subsequent appointments to provide symptom relief; see assessment and plan below for additional details:   - iteration: 1   - progress: partial   - (X)illumination, (X)contextualization,  "(working)detection, (working)development, (-)elaboration, (-)refinement  - functional status: fair  - mental status exam: as below  - prognosis: good    Psychiatric History:  Diagnoses: depression and anxiety  Outpatient history: denies  Inpatient history: denies  Medication trials: hydroxyzine, trazodone, lorazepam (was helpful), sertraline (GI distress), fluoxetine, escitalopram (headaches)  Other treatment modalities: has done some therapy in the past  Presenting regimen: N/A  Self harm: denies  Suicide attempts: denies     Substance Abuse History:   Types/methods/frequency: marijuana in high school, not since     Social History:  Residence: lives in an apartment with her  (2023), 3 YO daughter, and son (Eduardo, born 2024)  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  Education: RN + NP  Pertinent developmental history: denies; denies abuse hx  Pertinent legal history: denies  Hobbies/interests: chayo, reading  Rastafarian: \"in a gray space\"; raised Congregation (non-Sabianist)  Exercise: denies  Dietary habits: defer  Sleep hygiene: defer  Social habits: no pertinent issues  Sunlight: no concern for under-exposure  Caffeine intake: no pertinent issues; 1 cup of coffee in the morning, no tea, occasional soda, rare energy drink  Hydration habits: no pertinent issues   history: denies    Social History     Socioeconomic History    Marital status:    Tobacco Use    Smoking status: Never     Passive exposure: Never    Smokeless tobacco: Never   Vaping Use    Vaping status: Never Used   Substance and Sexual Activity    Alcohol use: Not Currently     Comment: socially    Drug use: Never    Sexual activity: Yes     Partners: Male     Birth control/protection: None     Tobacco use counseling/intervention: N/A, patient does not use tobacco; patient has been counseled with regard to risks of tobacco use.    PHQ-9 Depression Screening  PHQ-9 Total Score:      Little interest or pleasure in doing " things?     Feeling down, depressed, or hopeless?     Trouble falling or staying asleep, or sleeping too much?     Feeling tired or having little energy?     Poor appetite or overeating?     Feeling bad about yourself - or that you are a failure or have let yourself or your family down?     Trouble concentrating on things, such as reading the newspaper or watching television?     Moving or speaking so slowly that other people could have noticed? Or the opposite - being so fidgety or restless that you have been moving around a lot more than usual?     Thoughts that you would be better off dead, or of hurting yourself in some way?     PHQ-9 Total Score       Change in PHQ-9 since last measure: N/A (13)    GEOVANNI-7       Change in GEOVANNI-7 since last measure: N/A (19)    Problem List:  Patient Active Problem List   Diagnosis    Irritable bowel syndrome with both constipation and diarrhea    GEOVANNI (generalized anxiety disorder)    Major depressive disorder, recurrent episode, moderate    Panic disorder     Allergy:   Allergies   Allergen Reactions    Lactose Intolerance (Gi) GI Intolerance      Discontinued Medications:  There are no discontinued medications.    Current Medications:   Current Outpatient Medications   Medication Sig Dispense Refill    buPROPion XL (Wellbutrin XL) 150 MG 24 hr tablet Take 1 tablet by mouth Every Morning. 30 tablet 1    midodrine (PROAMATINE) 5 MG tablet Take 1 tablet by mouth 3 (Three) Times a Day Before Meals. 90 tablet 6    pantoprazole (Protonix) 20 MG EC tablet Take 1 tablet by mouth Daily. 30 tablet 0    propranolol (INDERAL) 10 MG tablet Take 1 tablet by mouth 2 (Two) Times a Day. 60 tablet 6    sucralfate (Carafate) 1 g tablet Take one tablet before every meal and at bedtime. 120 tablet 0     No current facility-administered medications for this visit.     Past Medical History:  Past Medical History:   Diagnosis Date    Anxiety     Chronic hypertension affecting pregnancy 11/21/2023     Depression     Heart valve disease     Noted on previous echo    Lactose intolerance     Orthostatic hypotension     Ovarian cyst     Found on one of my prenatal uktrasounds    POTS (postural orthostatic tachycardia syndrome)     Tachycardia     Urinary tract infection     Infrequently     Past Surgical History:  Past Surgical History:   Procedure Laterality Date    MANDIBLE SURGERY  2020    Reconstructive Jaw Surgery    WISDOM TOOTH EXTRACTION  2012     Mental Status Exam:   Appearance: well-groomed, sits upright, age-appropriate, normal habitus  Behavior: calm, cooperative, appropriate in demeanor, appropriate eye-contact  Mood/affect: fair / mood-congruent, but appropriate in both range and amplitude  Speech: within expected variance; appropriate rate, appropriate rhythm, appropriate tone; non-pressured  Thought Process: linear, goal-directed; no FOI or JOAN; abstraction intact  Thought Content: coherent, devoid of overt delusions/perceptual disturbances  SI/HI: denies both SI and HI; exhibits future-orientation, self-advocates appropriately, no regular self-harm, no appreciable intent  Memory: no overt deficits  Orientation: oriented to person/place/time/situation  Concentration: appropriate during interview  Intellectual capacity: presumptively average  Insight: fair by given history/exam  Judgment: appropriate by given history/exam  Psychomotor: no appreciable latency/retardation/agitation/tremor  Gait: deferred    Vital Signs:   There were no vitals taken for this visit.     Lab Results:   Lab on 09/23/2024   Component Date Value Ref Range Status    HCG Quantitative 09/23/2024 <1.00  mIU/mL Final   Orders Only on 09/13/2024   Component Date Value Ref Range Status    Glucose 09/13/2024 92  65 - 99 mg/dL Final    BUN 09/13/2024 12  6 - 20 mg/dL Final    Creatinine 09/13/2024 0.82  0.57 - 1.00 mg/dL Final    Sodium 09/13/2024 140  136 - 145 mmol/L Final    Potassium 09/13/2024 4.1  3.5 - 5.2 mmol/L Final     Chloride 09/13/2024 104  98 - 107 mmol/L Final    CO2 09/13/2024 24.7  22.0 - 29.0 mmol/L Final    Calcium 09/13/2024 9.8  8.6 - 10.5 mg/dL Final    Total Protein 09/13/2024 7.7  6.0 - 8.5 g/dL Final    Albumin 09/13/2024 4.7  3.5 - 5.2 g/dL Final    ALT (SGPT) 09/13/2024 26  1 - 33 U/L Final    AST (SGOT) 09/13/2024 25  1 - 32 U/L Final    Alkaline Phosphatase 09/13/2024 84  39 - 117 U/L Final    Total Bilirubin 09/13/2024 0.3  0.0 - 1.2 mg/dL Final    Globulin 09/13/2024 3.0  gm/dL Final    A/G Ratio 09/13/2024 1.6  g/dL Final    BUN/Creatinine Ratio 09/13/2024 14.6  7.0 - 25.0 Final    Anion Gap 09/13/2024 11.3  5.0 - 15.0 mmol/L Final    eGFR 09/13/2024 101.9  >60.0 mL/min/1.73 Final    Amylase 09/13/2024 54  28 - 100 U/L Final    Lipase 09/13/2024 27  13 - 60 U/L Final    WBC 09/13/2024 6.60  3.40 - 10.80 10*3/mm3 Final    RBC 09/13/2024 4.86  3.77 - 5.28 10*6/mm3 Final    Hemoglobin 09/13/2024 12.9  12.0 - 15.9 g/dL Final    Hematocrit 09/13/2024 40.1  34.0 - 46.6 % Final    MCV 09/13/2024 82.5  79.0 - 97.0 fL Final    MCH 09/13/2024 26.5 (L)  26.6 - 33.0 pg Final    MCHC 09/13/2024 32.2  31.5 - 35.7 g/dL Final    RDW 09/13/2024 14.5  12.3 - 15.4 % Final    RDW-SD 09/13/2024 42.6  37.0 - 54.0 fl Final    MPV 09/13/2024 14.5 (H)  6.0 - 12.0 fL Final    Platelets 09/13/2024 247  140 - 450 10*3/mm3 Final    H. pylori IgG 09/13/2024 Negative  Negative, Equivocal Final   Postpartum Visit on 07/10/2024   Component Date Value Ref Range Status    GARDNERELLA VAGINALIS 07/10/2024 Positive (A)  Negative Final    TRICHOMONAS VAGINALIS 07/10/2024 Negative  Negative Final    ABEL SPECIES 07/10/2024 Negative  Negative Final   Admission on 06/02/2024, Discharged on 06/03/2024   Component Date Value Ref Range Status    WBC 06/02/2024 9.21  3.40 - 10.80 10*3/mm3 Final    RBC 06/02/2024 4.33  3.77 - 5.28 10*6/mm3 Final    Hemoglobin 06/02/2024 10.7 (L)  12.0 - 15.9 g/dL Final    Hematocrit 06/02/2024 34.2  34.0 - 46.6 %  Final    MCV 06/02/2024 79.0  79.0 - 97.0 fL Final    MCH 06/02/2024 24.7 (L)  26.6 - 33.0 pg Final    MCHC 06/02/2024 31.3 (L)  31.5 - 35.7 g/dL Final    RDW 06/02/2024 14.4  12.3 - 15.4 % Final    RDW-SD 06/02/2024 40.8  37.0 - 54.0 fl Final    MPV 06/02/2024    Final    Test unavailable due to sample interference    Platelets 06/02/2024 210  140 - 450 10*3/mm3 Final    Glucose 06/02/2024 80  65 - 99 mg/dL Final    BUN 06/02/2024 9  6 - 20 mg/dL Final    Creatinine 06/02/2024 0.58  0.57 - 1.00 mg/dL Final    Sodium 06/02/2024 138  136 - 145 mmol/L Final    Potassium 06/02/2024 3.9  3.5 - 5.2 mmol/L Final    Chloride 06/02/2024 105  98 - 107 mmol/L Final    CO2 06/02/2024 20.3 (L)  22.0 - 29.0 mmol/L Final    Calcium 06/02/2024 8.7  8.6 - 10.5 mg/dL Final    Total Protein 06/02/2024 6.5  6.0 - 8.5 g/dL Final    Albumin 06/02/2024 3.7  3.5 - 5.2 g/dL Final    ALT (SGPT) 06/02/2024 8  1 - 33 U/L Final    AST (SGOT) 06/02/2024 14  1 - 32 U/L Final    Alkaline Phosphatase 06/02/2024 194 (H)  39 - 117 U/L Final    Total Bilirubin 06/02/2024 0.3  0.0 - 1.2 mg/dL Final    Globulin 06/02/2024 2.8  gm/dL Final    A/G Ratio 06/02/2024 1.3  g/dL Final    BUN/Creatinine Ratio 06/02/2024 15.5  7.0 - 25.0 Final    Anion Gap 06/02/2024 12.7  5.0 - 15.0 mmol/L Final    eGFR 06/02/2024 129.0  >60.0 mL/min/1.73 Final    Bile Acids Total 06/02/2024 6  0 - 10 umol/L Final    Color 06/02/2024 Yellow  Yellow, Straw, Dark Yellow, Christelle Final    Clarity, UA 06/02/2024 Clear  Clear Final    Glucose, UA 06/02/2024 Negative  Negative mg/dL Final    Bilirubin 06/02/2024 Negative  Negative Final    Ketones, UA 06/02/2024 Negative  Negative Final    Specific Gravity  06/02/2024 1.015  1.005 - 1.030 Final    Blood, UA 06/02/2024 Negative  Negative Final    pH, Urine 06/02/2024 6.5  5.0 - 8.0 Final    Protein, POC 06/02/2024 Negative  Negative mg/dL Final    Urobilinogen, UA 06/02/2024 Normal  Normal, 0.2 E.U./dL Final    Leukocytes 06/02/2024  Trace (A)  Negative Final    Nitrite, UA 06/02/2024 Negative  Negative Final    Treponemal AB Total 06/02/2024 Non-Reactive  Non-Reactive Final    Amphet/Methamphet, Screen 06/02/2024 Negative  Negative Final    Barbiturates Screen, Urine 06/02/2024 Negative  Negative Final    Benzodiazepine Screen, Urine 06/02/2024 Negative  Negative Final    Cocaine Screen, Urine 06/02/2024 Negative  Negative Final    Opiate Screen 06/02/2024 Negative  Negative Final    THC, Screen, Urine 06/02/2024 Negative  Negative Final    Methadone Screen, Urine 06/02/2024 Negative  Negative Final    Oxycodone Screen, Urine 06/02/2024 Negative  Negative Final    Fentanyl, Urine 06/02/2024 Negative  Negative Final    ABO Type 06/02/2024 O   Final    RH type 06/02/2024 Positive   Final    Antibody Screen 06/02/2024 Negative   Final    T&S Expiration Date 06/02/2024 6/5/2024 11:59:59 PM   Final    Creatinine, Urine 06/02/2024 67.6  mg/dL Final    Total Protein, Urine 06/02/2024 17.0  mg/dL Final    Protein/Creatinine Ratio, Urine 06/02/2024 0.25   Final    pH, Cord Arterial 06/02/2024 7.35 (H)  7.21 - 7.31 pH Units Final    pCO2, Cord Arterial 06/02/2024 39.0  33.0 - 49.0 mmHg Final    Base Exc, Cord Arterial 06/02/2024 -3.9 (L)  -2.0 - 2.0 mmol/L Final    pO2, Cord Arterial 06/02/2024 <40.5  mmHg Final    HCO3, Cord Arterial 06/02/2024 21.2  mmol/L Final    pH, Cord Venous 06/02/2024 7.425 (H)  7.310 - 7.370 pH Units Final    pCO2, Cord Venous 06/02/2024 32.6  28.0 - 40.0 mm Hg Final    pO2, Cord Venous 06/02/2024 <40.5 (H)  21.0 - 31.0 mm Hg Final    Base Excess, Cord Venous 06/02/2024 -2.5 (L)  -2.0 - 2.0 mmol/L Final    HCO3, Cord Venous 06/02/2024 20.9  mmol/L Final   Routine Prenatal on 05/29/2024   Component Date Value Ref Range Status    Glucose, UA 05/29/2024 Negative  Negative mg/dL Final    Protein, POC 05/29/2024 Trace (A)  Negative mg/dL Corrected   Routine Prenatal on 05/23/2024   Component Date Value Ref Range Status    Glucose,  UA 05/23/2024 Negative  Negative mg/dL Final    Protein, POC 05/23/2024 Negative  Negative mg/dL Final   Routine Prenatal on 05/15/2024   Component Date Value Ref Range Status    Glucose, UA 05/15/2024 Negative  Negative mg/dL Final    Protein, POC 05/15/2024 Negative  Negative mg/dL Final    Group B Strep Culture 05/15/2024 No Group B Streptococcus isolated   Final   Admission on 05/07/2024, Discharged on 05/07/2024   Component Date Value Ref Range Status    SARS Antigen 05/07/2024 Not Detected  Not Detected, Presumptive Negative Final    Influenza A Antigen DANNY 05/07/2024 Not Detected  Not Detected Final    Influenza B Antigen DANNY 05/07/2024 Not Detected  Not Detected Final    Internal Control 05/07/2024 Passed  Passed Final    Lot Number 05/07/2024 [id number]   Final    Expiration Date 05/07/2024 1/16/25   Final    Rapid Strep A Screen 05/07/2024 Negative   Final    Internal Control 05/07/2024 Passed   Final    Lot Number 05/07/2024 [id number]   Final    Expiration Date 05/07/2024 2/27/25   Final    COVID19 05/07/2024 Detected (C)  Not Detected - Ref. Range Final    Throat Culture, Beta Strep 05/07/2024 No Beta Hemolytic Streptococcus Isolated   Final   Routine Prenatal on 05/01/2024   Component Date Value Ref Range Status    Glucose, UA 05/01/2024 Negative  Negative mg/dL Final    Protein, POC 05/01/2024 Negative  Negative mg/dL Final   Routine Prenatal on 04/17/2024   Component Date Value Ref Range Status    Glucose, UA 04/17/2024 Negative  Negative mg/dL Final    Protein, POC 04/17/2024 Negative  Negative mg/dL Final   There may be more visits with results that are not included.     EKG Results:  No orders to display     Imaging Results:  No Images in the past 120 days found.    ASSESSMENT AND PLAN:    ICD-10-CM ICD-9-CM   1. Major depressive disorder, recurrent episode, moderate  F33.1 296.32   2. GEOVANNI (generalized anxiety disorder)  F41.1 300.02   3. Panic disorder  F41.0 300.01     23 y.o. female who  presents today for follow-up regarding MDD, GEOVANNI, panic. We have discussed the interval history and the treatment plan below, including potential R/B/SE of the recommended regimen of which the patient demonstrates understanding. Patient is agreeable to call 911 or go to the nearest ER should she become concerned for her own safety and/or the safety of those around her. There are no overt indices of acute jaye/psychosis on exam today.     Medication regimen: titrate bupropion XL to 300 mg QAM; patient is advised not to misuse prescribed medications or to use them with any exogenous substances that aren't disclosed to this provider as they may interact with the regimen to the patient's detriment.   Risk assessment: protracted risk is low, imminent risk is low - no interval change. Do note that this is subject to change with the Confucianism of new stressors, treatment non-adherence, use of substances, and/or new medical ails.   Monitoring: reviewed labs/imaging as populated above  Therapy: referred  Follow-up: 6 weeks  Communications: N/A  Treatment plan: 08/27/2024    TREATMENT PLAN/GOALS: challenge patterns of living conducive to symptom burden, implement recommended regimen as above with augmentative, intermittent supportive psychotherapy to reduce symptom burden. Patient acknowledged and verbally consented to continue treatment. The importance of adherence to the recommended treatment and interval follow-up appointments was again emphasized today: patient has good treatment adherence per given history. Patient was today reminded to limit daily caffeine intake, hydrate appropriately, eat healthy and nutritious foods, engage sleep hygiene measures, engage appropriate exposure to sunlight, engage with hobbies in balance with life necessities, and exercise appropriate to their capacity to do so.     Billing: This encounter is of moderate complexity based on number/complexity of problems addressed today and risk of  complications/morbidity: 2+ stable chronic illnesses and prescription management.     Parts of this note are electronic transcriptions/translations of spoken language to printed text using the Dragon Dictation system.    Electronically signed by Silvano Odom MD, 10/01/24, [id number]

## 2024-10-02 ENCOUNTER — TELEPHONE (OUTPATIENT)
Dept: PSYCHIATRY | Facility: CLINIC | Age: 24
End: 2024-10-02
Payer: COMMERCIAL

## 2024-10-09 NOTE — PROGRESS NOTES
Chief Complaint    Annual Exam  Annual Exam    Subjective          Ana Paula Amanda is a 23 y.o. female who presents to [facility]    Annual Exam    History of Present Illness  The patient presents for evaluation of multiple medical concerns.    She has been experiencing a lack of interest or pleasure in activities for several days over the past two weeks. She also reports feelings of depression and hopelessness, along with sleep disturbances such as difficulty falling asleep, staying asleep, or excessive sleepiness. She feels fatigued and low on energy nearly every day and has noticed a decrease in her appetite. She does not endorse feelings of worthlessness or failure but admits to having trouble concentrating on tasks like reading or watching TV. She also reports feeling restless almost daily. These issues have somewhat interfered with her ability to work and manage household responsibilities. She is currently on Wellbutrin 300, which she tolerates well. Her psychiatrist, Dr. Sommers, believes her symptoms are primarily mood-based. She has a follow-up appointment with him in a few weeks. She also reports agitation, stating that minor issues can quickly escalate her emotions.    She is requesting a refill of her Protonix prescription. Her bowel movements have returned to their usual pattern of constipation. She has obtained psyllium husk tablets and plans to start taking them to see if they provide relief.    She is up to date on her dental exams and goes every 6 months. She had an eye exam last year and got new glasses. Dr. Frye put her on propranolol for her POTS, and it has helped a lot with her physical symptoms of anxiety. She is not shaking and her heart is not racing as much when she gets anxious.    FAMILY HISTORY  She denies any family history of breast cancer or colon cancer.    IMMUNIZATIONS  She has received her influenza vaccine.          Health Maintenance Due    Topic Date Due    HPV VACCINES (1 - 3-dose series) Never done    ANNUAL PHYSICAL  08/17/2024    COVID-19 Vaccine (4 - 2023-24 season) 09/01/2024               Review of Systems   Constitutional:  Negative for chills, fatigue and fever.   Respiratory:  Negative for cough and shortness of breath.    Cardiovascular:  Negative for chest pain and palpitations.   Gastrointestinal:  Negative for constipation, diarrhea, nausea and vomiting.   Musculoskeletal:  Negative for back pain and neck pain.   Skin:  Negative for rash.   Neurological:  Negative for dizziness and headaches.   Psychiatric/Behavioral:  Positive for agitation and sleep disturbance. Negative for dysphoric mood. The patient is nervous/anxious.           Medical History: has a past medical history of Anxiety, Chronic hypertension affecting pregnancy (11/21/2023), Depression, Heart valve disease, History of medical problems, Irritable bowel syndrome, Lactose intolerance, Orthostatic hypotension, Ovarian cyst, POTS (postural orthostatic tachycardia syndrome), Tachycardia, and Urinary tract infection.     Surgical History: has a past surgical history that includes Mandible surgery (2020) and [place] tooth extraction (2012).     Family History: family history includes ADD / ADHD in her brother; Anxiety disorder in her mother; Clotting disorder in her paternal aunt and paternal grandmother; Depression in her father and mother; Drug abuse in her brother; Heart attack in her paternal grandmother; Heart disease in her maternal grandfather, maternal grandmother, mother, and paternal grandmother; Hyperlipidemia in her father and mother; Hypertension in her father and paternal grandmother; Lung cancer in her maternal grandmother; Melanoma in her maternal grandfather; No Known Problems in her cousin, maternal aunt, maternal uncle, paternal uncle, sister, and another family member; OCD in her mother; Stroke in her paternal grandfather; Thyroid disease in her mother.  "    Social History: reports that she has never smoked. She has never been exposed to tobacco smoke. She has never used smokeless tobacco. She reports that she does not currently use alcohol. She reports that she does not use drugs.    Allergies: Lactose intolerance (gi)        Current Outpatient Medications:     buPROPion XL (Wellbutrin XL) 300 MG 24 hr tablet, Take 1 tablet by mouth Every Morning., Disp: 30 tablet, Rfl: 1    midodrine (PROAMATINE) 5 MG tablet, Take 1 tablet by mouth 3 (Three) Times a Day Before Meals., Disp: 90 tablet, Rfl: 6    pantoprazole (Protonix) 20 MG EC tablet, Take 1 tablet by mouth Daily., Disp: 90 tablet, Rfl: 1    propranolol (INDERAL) 10 MG tablet, Take 1 tablet by mouth 2 (Two) Times a Day., Disp: 60 tablet, Rfl: 6      Immunization History   Administered Date(s) Administered    COVID-19 (MODERNA) 1st,2nd,3rd Dose Monovalent 12/24/2020, 01/19/2021, 01/06/2022    Fluzone  >6mos 10/31/2024    Fluzone (or Fluarix & Flulaval for VFC) >6mos 10/19/2023    Hepatitis B Adult/Adolescent IM 07/26/2021    Tdap 07/22/2020, 03/29/2022, 04/24/2024         Objective       Vitals:    10/31/24 0855   BP: 110/70   Pulse: 95   Temp: 98.3 °F (36.8 °C)   TempSrc: Temporal   SpO2: 100%   Weight: 60.1 kg (132 lb 9.6 oz)   Height: 167.6 cm (65.98\")      Body mass index is 21.41 kg/m².   Wt Readings from Last 3 Encounters:   10/31/24 60.1 kg (132 lb 9.6 oz)   09/24/24 61.2 kg (135 lb)   09/19/24 62.6 kg (138 lb)      BP Readings from Last 3 Encounters:   10/31/24 110/70   09/24/24 123/75   09/19/24 106/74        BMI is within normal parameters. No other follow-up for BMI required.        Physical Exam  Vitals reviewed.   Constitutional:       Appearance: Normal appearance. She is well-developed.   HENT:      Head: Normocephalic and atraumatic.   Eyes:      Conjunctiva/sclera: Conjunctivae normal.      Pupils: Pupils are equal, round, and reactive to light.   Cardiovascular:      Rate and Rhythm: Normal rate " and regular rhythm.      Heart sounds: Normal heart sounds. No murmur heard.  Pulmonary:      Effort: Pulmonary effort is normal.      Breath sounds: Normal breath sounds. No wheezing or rhonchi.   Abdominal:      General: Bowel sounds are normal. There is no distension.      Palpations: Abdomen is soft.      Tenderness: There is no abdominal tenderness.   Skin:     General: Skin is warm and dry.   Neurological:      Mental Status: She is alert and oriented to person, place, and time.   Psychiatric:         Mood and Affect: Mood and affect normal.         Behavior: Behavior normal.         Thought Content: Thought content normal.         Judgment: Judgment normal.       Physical Exam        Result Review :       Common labs          2/19/2024    14:05 6/2/2024    01:31 9/13/2024    16:36   Common Labs   Glucose  80  92    BUN  9  12    Creatinine  0.58  0.82    Sodium  138  140    Potassium  3.9  4.1    Chloride  105  104    Calcium  8.7  9.8    Albumin  3.7  4.7    Total Bilirubin  0.3  0.3    Alkaline Phosphatase  194  84    AST (SGOT)  14  25    ALT (SGPT)  8  26    WBC 8.94  9.21  6.60    Hemoglobin 11.8  10.7  12.9    Hematocrit 35.0  34.2  40.1    Platelets 202  210  247      Results                   Assessment and Plan          Diagnoses and all orders for this visit:    1. Annual physical exam (Primary)  -     CBC (No Diff); Future  -     CBC (No Diff)    2. Screening for lipid disorders  -     Lipid Panel; Future  -     Comprehensive Metabolic Panel; Future  -     Comprehensive Metabolic Panel  -     Lipid Panel    3. Screening for thyroid disorder  -     TSH; Future  -     TSH    4. Need for influenza vaccination  -     Fluzone >6mos (3335-3160)    5. Gastroesophageal reflux disease without esophagitis  -     pantoprazole (Protonix) 20 MG EC tablet; Take 1 tablet by mouth Daily.  Dispense: 90 tablet; Refill: 1        Assessment & Plan  1. Depression.  Her PHQ-9 score is 14, indicating moderate depression.  She is currently taking Wellbutrin 300 mg and tolerating it well. Dr. Sommers believes her symptoms are more mood-based rather than anxiety-related. She is advised to continue seeing Dr. Sommers for psychiatric follow-up. The addition of another medication to her current Wellbutrin regimen should be discussed with Dr. Sommers.    2. Anxiety.  She is currently taking propranolol for her physical symptoms of anxiety, which has helped reduce shaking and heart racing. Continued use of propranolol is recommended.    3. Constipation.  She reports experiencing constipation and has obtained psyllium husk tablets to help manage her symptoms. She is advised to start taking the psyllium husk tablets to see if they help.     4. Medication Management.  A refill for Protonix is requested and will be sent to her pharmacy.    5. Health Maintenance.  She has had a dental exam and an eye exam within the last year. She received her flu shot. Blood work has been ordered and will be done today.    Follow-up  Return in one year for follow-up.    Follow Up     Return in about 1 year (around 10/31/2025) for Annual physical.    Updated annual wellness visit checklist.  Immunizations discussed.  Screening discussed and/or ordered.  Recommend yearly dental and eye exams. Also discussed monitoring of blood pressure and lipids.      Patient was given instructions and counseling regarding her condition or for health maintenance advice. Please see specific information pulled into the AVS if appropriate.     Patient or patient representative verbalized consent for the use of Ambient Listening during the visit with  PARIS Guerin for chart documentation. 10/31/2024  09:16 EDT    PARIS Guerin

## 2024-10-17 ENCOUNTER — TELEPHONE (OUTPATIENT)
Dept: OBSTETRICS AND GYNECOLOGY | Facility: CLINIC | Age: 24
End: 2024-10-17

## 2024-10-17 NOTE — TELEPHONE ENCOUNTER
Caller: Ana Paula Amanda    Relationship to patient: Self    Best call back number: [phone] / LVM    Type of visit: GYN F/U - IUD CHECK     Requested date: ANY    If rescheduling, when is the original appointment: 10/22/24    Additional notes: PT HAS TO TAKE HER DAUGHTER TO [facility] FOR AN APPT ON THE SAME DAY - PT NEEDS TO R/S - HUB COULD NOT FIND ANYTHING UNTIL JAN 2025    PLEASE CALL THE PT TO R/S

## 2024-10-31 ENCOUNTER — OFFICE VISIT (OUTPATIENT)
Dept: OBSTETRICS AND GYNECOLOGY | Facility: CLINIC | Age: 24
End: 2024-10-31
Payer: COMMERCIAL

## 2024-10-31 ENCOUNTER — OFFICE VISIT (OUTPATIENT)
Dept: FAMILY MEDICINE CLINIC | Facility: CLINIC | Age: 24
End: 2024-10-31
Payer: COMMERCIAL

## 2024-10-31 VITALS
DIASTOLIC BLOOD PRESSURE: 70 MMHG | HEIGHT: 66 IN | BODY MASS INDEX: 21.31 KG/M2 | TEMPERATURE: 98.3 F | SYSTOLIC BLOOD PRESSURE: 110 MMHG | WEIGHT: 132.6 LBS | OXYGEN SATURATION: 100 % | HEART RATE: 95 BPM

## 2024-10-31 VITALS
SYSTOLIC BLOOD PRESSURE: 107 MMHG | HEIGHT: 66 IN | WEIGHT: 132 LBS | DIASTOLIC BLOOD PRESSURE: 76 MMHG | BODY MASS INDEX: 21.21 KG/M2 | HEART RATE: 85 BPM

## 2024-10-31 DIAGNOSIS — Z23 NEED FOR INFLUENZA VACCINATION: ICD-10-CM

## 2024-10-31 DIAGNOSIS — Z13.220 SCREENING FOR LIPID DISORDERS: ICD-10-CM

## 2024-10-31 DIAGNOSIS — Z00.00 ANNUAL PHYSICAL EXAM: Primary | ICD-10-CM

## 2024-10-31 DIAGNOSIS — Z30.431 IUD CHECK UP: Primary | ICD-10-CM

## 2024-10-31 DIAGNOSIS — Z13.29 SCREENING FOR THYROID DISORDER: ICD-10-CM

## 2024-10-31 DIAGNOSIS — K21.9 GASTROESOPHAGEAL REFLUX DISEASE WITHOUT ESOPHAGITIS: ICD-10-CM

## 2024-10-31 LAB
ALBUMIN SERPL-MCNC: 4.7 G/DL (ref 3.5–5.2)
ALBUMIN/GLOB SERPL: 1.7 G/DL
ALP SERPL-CCNC: 73 U/L (ref 39–117)
ALT SERPL W P-5'-P-CCNC: 14 U/L (ref 1–33)
ANION GAP SERPL CALCULATED.3IONS-SCNC: 8 MMOL/L (ref 5–15)
AST SERPL-CCNC: 17 U/L (ref 1–32)
BILIRUB SERPL-MCNC: 0.8 MG/DL (ref 0–1.2)
BUN SERPL-MCNC: 13 MG/DL (ref 6–20)
BUN/CREAT SERPL: 13.4 (ref 7–25)
CALCIUM SPEC-SCNC: 9.3 MG/DL (ref 8.6–10.5)
CHLORIDE SERPL-SCNC: 108 MMOL/L (ref 98–107)
CHOLEST SERPL-MCNC: 172 MG/DL (ref 0–200)
CO2 SERPL-SCNC: 24 MMOL/L (ref 22–29)
CREAT SERPL-MCNC: 0.97 MG/DL (ref 0.57–1)
DEPRECATED RDW RBC AUTO: 44.4 FL (ref 37–54)
EGFRCR SERPLBLD CKD-EPI 2021: 83.3 ML/MIN/1.73
ERYTHROCYTE [DISTWIDTH] IN BLOOD BY AUTOMATED COUNT: 14.1 % (ref 12.3–15.4)
GLOBULIN UR ELPH-MCNC: 2.7 GM/DL
GLUCOSE SERPL-MCNC: 82 MG/DL (ref 65–99)
HCT VFR BLD AUTO: 40.4 % (ref 34–46.6)
HDLC SERPL-MCNC: 46 MG/DL (ref 40–60)
HGB BLD-MCNC: 13.1 G/DL (ref 12–15.9)
LDLC SERPL CALC-MCNC: 116 MG/DL (ref 0–100)
LDLC/HDLC SERPL: 2.53 {RATIO}
MCH RBC QN AUTO: 27.9 PG (ref 26.6–33)
MCHC RBC AUTO-ENTMCNC: 32.4 G/DL (ref 31.5–35.7)
MCV RBC AUTO: 86.1 FL (ref 79–97)
PLATELET # BLD AUTO: 249 10*3/MM3 (ref 140–450)
PMV BLD AUTO: 13.2 FL (ref 6–12)
POTASSIUM SERPL-SCNC: 3.9 MMOL/L (ref 3.5–5.2)
PROT SERPL-MCNC: 7.4 G/DL (ref 6–8.5)
RBC # BLD AUTO: 4.69 10*6/MM3 (ref 3.77–5.28)
SODIUM SERPL-SCNC: 140 MMOL/L (ref 136–145)
TRIGL SERPL-MCNC: 49 MG/DL (ref 0–150)
TSH SERPL DL<=0.05 MIU/L-ACNC: 0.55 UIU/ML (ref 0.27–4.2)
VLDLC SERPL-MCNC: 10 MG/DL (ref 5–40)
WBC NRBC COR # BLD AUTO: 5.75 10*3/MM3 (ref 3.4–10.8)

## 2024-10-31 PROCEDURE — 80050 GENERAL HEALTH PANEL: CPT | Performed by: NURSE PRACTITIONER

## 2024-10-31 PROCEDURE — 80061 LIPID PANEL: CPT | Performed by: NURSE PRACTITIONER

## 2024-10-31 RX ORDER — PANTOPRAZOLE SODIUM 20 MG/1
20 TABLET, DELAYED RELEASE ORAL DAILY
Qty: 90 TABLET | Refills: 1 | Status: SHIPPED | OUTPATIENT
Start: 2024-10-31

## 2024-10-31 NOTE — PROGRESS NOTES
"Post IUD Visit      CC:  Scheduled IUD check  Chief Complaint   Patient presents with    POST IUD CHECK       HPI:  Pain/Cramping:  No  Vaginal Bleeding:  LIGHT SPOTTING, INTERMITTENT  Pain w sex: No  Feels strings easily:  N/A  Last PAP date and results:     PHYSICAL EXAM:  /76   Pulse 85   Ht 167.6 cm (65.98\")   Wt 59.9 kg (132 lb)   BMI 21.32 kg/m²   General- NAD, alert and oriented, appropriate  Psych- Normal mood, good memory  Abdomen- Soft, non distended, non tender, no masses  External genitalia- Normal, no lesions  Urethra- Normal, no masses, non tender  Vagina- Normal, no atrophy, scant menstrual blood in vault, no prolapse  Bladder- Normal, no masses, non tender, no prolapse  Cvx- Normal, no lesions, no discharge, No cervical motion tenderness, IUD strings visable 2cm  Uterus- Normal size, shape & consistency.  Non tender, mobile.  Adnexa- No mass, non tender    ASSESSMENT AND PLAN:    Diagnoses and all orders for this visit:    1. IUD check up (Primary)    Counseling:  Pt was counseled to perform monthly string checks. Keep track of menses.    RTO if <q21d, >7d long, or heavy or if positive pregnancy test.    Continue OTC motrin and/or tylenol PRN cramping    Follow Up:  Return for wwe.      Daniela Yusuf, APRN  10/31/2024    [facility]  [address] [place] [address] [address] [place] [address]  Dept: [phone]  Dept Fax: [phone]  Loc: [phone]  Loc Fax: [phone]   "

## 2024-10-31 NOTE — LETTER
October 31, 2024     Patient: Ana Paula Amanda   YOB: 2000   Date of Visit: 10/31/2024       To Whom It May Concern:    Ana Paula Amanda  was seen today at my office for an Annual physical .           Sincerely,        PARIS Guerin

## 2024-12-06 ENCOUNTER — TELEPHONE (OUTPATIENT)
Dept: OBSTETRICS AND GYNECOLOGY | Facility: CLINIC | Age: 24
End: 2024-12-06
Payer: COMMERCIAL

## 2024-12-06 NOTE — TELEPHONE ENCOUNTER
12/6/2024 LVM FOR PATIENT TO RETURN CALL TO RESCHEDULE 1/7/2025 ANNUAL APPT WITH DR DOC HOLLEY SINCE HER SCHEDULE HAS CHANGED.   HUB TO RELAY   12/17/2024 PATIENT SCHEDULED FOR 2/4/25 @ 11:30 W DR HOLLEY FOR ANNUAL.

## 2025-01-14 ENCOUNTER — TELEMEDICINE (OUTPATIENT)
Dept: PSYCHIATRY | Facility: CLINIC | Age: 25
End: 2025-01-14
Payer: COMMERCIAL

## 2025-01-14 DIAGNOSIS — Z03.89 NO DIAGNOSIS ON AXIS I: Primary | ICD-10-CM

## 2025-01-15 NOTE — PROGRESS NOTES
"[facility]  Follow-up Visit    Chief Complaint:  \"I've struggled with anxiety and depression for basically as long as I can remember... 2 years ago my brother  of an overdose... basically since then everything has gotten worse.\"    History of Present Illness: Ana Paula Amanda is a 24 y.o. female who presents today for follow-up. Last seen by this practice: 10/01 at which time bupropion was titrated. Services today rendered via telehealth ([facility]) for which the patient provided informed consent. Patient is aware she can refuse to be seen remotely at any time. Patient was located in a secure, remote environment (her office at work) as was the provider (in-office). I confirmed the patient's identity prior to evaluation and reiterated my credentials; there were no technical issues during the session today.    Current treatment regimen includes:   - bupropion  mg QAM  - via another provider: propranolol 10 mg BID    Ana Paula presents on time and unaccompanied in no acute distress and engages with me appropriately. Today she reports she's \"hanging in there\". Depressive symptoms are better managed with current interventions. Anxious symptoms are fairly managed with current interventions. Irritability endures. She feels her regimen conveys sufficient benefit, but would like to address irritability with medication if feasible - discussed clonidine for which she is amenable.  - sleep: generally fair - no change  - appetite: poor, worse  - medication adverse effects: diminished appetite, irritability    Interval History and Clinical Commentary:   Ego-syntonic. Ana Paula presents in a fashion consistent with prior assessments with regard to MSE.    She's noticed that interest has improved - she's been more engaged through the day and is enjoying activities more. She bought a home last month and is doing renovations - there were undisclosed damages for which she'll have to seek " . Daughter is being worked up by audiology and neuropsychology (autism testing).    Axis I: MDD, GEOVANNI, panic disorder  Axis II: defer  Axis III: defer  Axis IV: housing stress, familial stress  Axis V: 70    Differential considerations: N/A    Adherence:  Treatment adherence is appropriate; issues in this regard have included: N/A. Patient is advised not to misuse prescribed medications or to use them with any exogenous substances that aren't disclosed to this provider as they may interact with the regimen to the patient's detriment. The importance of adherence to the recommended treatment and interval follow-up appointments has been emphasized.     Education:  I have counseled Ana Paula with regard to diagnoses and the recommended treatment regimen as documented below: I will begin clonidine for anxiety/irritability; I have advised this agent has propensity to diminish blood pressure and may result in dizziness, sedation, xerostomia. I have advised that because medications can elicit idiosyncratic reactions in different individuals that SE may present in ways that haven't been discussed. I have reiterated the importance of discussing with me any clinical changes that could represent potential adverse effects regarding the medication regimen.    Patient acknowledges the diagnoses per my rendered interpretation. Patient is agreeable to call 911 or go to the nearest ER should she become concerned for her own safety and/or the safety of those around her. Patient demonstrates understanding of potential risks/benefits/side effects associated with the recommended treatment regimen and is amenable to proceed in the fashion outlined below. Patient has been encouraged to cultivate constructive patterns of living including limiting daily caffeine intake, hydrating appropriately, regularly eating nutritious foods, engaging sleep hygiene practices, engaging in appropriate exposure to sunlight, engaging with hobbies in  balance with life necessities, and exercising appropriate to their capacity to do so.    Risk:  There is no significant change to risk profile discernible during today's evaluation - do note that this is subject to change with the [employer] of new stressors, treatment non-adherence, use of substances, and/or new medical ails. There is no appreciable evidence of intent for harm to self or others. There are no appreciable indices of jaye/psychosis.    Contraception and mitigation of potential teratogenicity: uses Mirena. I have advised there are risks taking any medication during pregnancy and, unfortunately, these risks are poorly elaborated due to ethical concerns with studying medications in pregnant women. I have advised that in the case of pregnancy risk may be beyond what I'm able to advise and the general approach is that medication use should only be considered if potential benefits outweigh the possibility of risks by the patient's measure.    Psychotherapy:  - Time: 12 minutes  - interventions employed: the therapeutic alliance was strengthened to encourage the patient to express their thoughts and feelings freely. Esteem building was enhanced through praise, reassurance, normalizing/challenging, and encouragement as appropriate. Coping skills were enhanced to build distress tolerance skills and emotional regulation. Allowed patient to freely discuss issues without interruption or judgement with unconditional positive regard, active listening skills, and empathy. Provided a safe, confidential environment to facilitate the development of a positive therapeutic relationship and encourage open, honest communication. Assisted patient in processing session content; acknowledged and normalized/addressed, as appropriate, patient’s thoughts, feelings, and concerns by utilizing a person-centered approach in efforts to build appropriate rapport and a positive therapeutic relationship.   - Diagnoses: see assessment and  "plan below  - Symptoms: see subjective above  - Goals              - patient: improve mood, mitigate anxiety, bolster functional capacity              - provider: challenge patterns of living conducive to pathology, strengthen defenses, promote problems solving, restore adaptive functioning and provide symptom relief.  - Treatment plan: continue supportive psychotherapy in subsequent appointments to provide symptom relief; see assessment and plan below for additional details:              - iteration: 1              - progress: partial              - (X)illumination, (X)contextualization, (working)detection, (working)development, (-)elaboration, (-)refinement  - functional status: fair  - mental status exam: as below  - prognosis: good    Psychiatric History:  Diagnoses: depression and anxiety  Outpatient history: denies  Inpatient history: denies  Medication trials: hydroxyzine, trazodone, lorazepam (was helpful), sertraline (GI distress), fluoxetine, escitalopram (headaches)  Other treatment modalities: has done some therapy in the past  Presenting regimen: N/A  Self harm: denies  Suicide attempts: denies     Substance Abuse History:   Types/methods/frequency: marijuana in high school, not since     Social History:  Residence: lives in an apartment with her  (2023), 3 YO daughter, and son (Eduardo, born 2024)  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  Education: RN + NP  Pertinent developmental history: denies; denies abuse hx  Pertinent legal history: denies  Hobbies/interests: chayo, reading  Jehovah's witness: \"in a gray space\"; raised Jain (non-Baptist)  Exercise: denies  Dietary habits: defer  Sleep hygiene: defer  Social habits: no pertinent issues  Sunlight: no concern for under-exposure  Caffeine intake: no pertinent issues; 1 cup of coffee in the morning, no tea, occasional soda, rare energy drink  Hydration habits: no pertinent issues   history: denies    Social History "     Socioeconomic History    Marital status:    Tobacco Use    Smoking status: Never     Passive exposure: Never    Smokeless tobacco: Never   Vaping Use    Vaping status: Never Used   Substance and Sexual Activity    Alcohol use: Not Currently     Comment: socially    Drug use: Never    Sexual activity: Yes     Partners: Male     Birth control/protection: I.U.D.     Comment: CINDI 9-2024     Tobacco use counseling/intervention: N/A, patient does not use tobacco; patient has been counseled with regard to risks of tobacco use.    PHQ-9 Depression Screening  PHQ-9 Total Score:       Little interest or pleasure in doing things?     Feeling down, depressed, or hopeless?     PHQ-2 Total Score     Trouble falling or staying asleep, or sleeping too much?     Feeling tired or having little energy?     Poor appetite or overeating?     Feeling bad about yourself - or that you are a failure or have let yourself or your family down?     Trouble concentrating on things, such as reading the newspaper or watching television?     Moving or speaking so slowly that other people could have noticed? Or the opposite - being so fidgety or restless that you have been moving around a lot more than usual?     Thoughts that you would be better off dead, or of hurting yourself in some way?     PHQ-9 Total Score     If you checked off any problems, how difficult have these problems made it for you to do your work, take care of things at home, or get along with other people?         Change in PHQ-9 since last measure: N/A (13)    GEOVANNI-7       Change in GEOVANNI-7 since last measure: N/A (19)    Problem List:  Patient Active Problem List   Diagnosis    Irritable bowel syndrome with both constipation and diarrhea    GEOVANNI (generalized anxiety disorder)    Major depressive disorder, recurrent episode, moderate    Panic disorder     Allergy:   Allergies   Allergen Reactions    Lactose Intolerance (Gi) GI Intolerance        Discontinued  Medications:  Medications Discontinued During This Encounter   Medication Reason    buPROPion XL (Wellbutrin XL) 300 MG 24 hr tablet Reorder       Current Medications:   Current Outpatient Medications   Medication Sig Dispense Refill    buPROPion XL (Wellbutrin XL) 300 MG 24 hr tablet Take 1 tablet by mouth Every Morning. 30 tablet 1    cloNIDine (Catapres) 0.1 MG tablet Take 1 tablet by mouth 2 (Two) Times a Day. 180 tablet 0    midodrine (PROAMATINE) 5 MG tablet Take 1 tablet by mouth 3 (Three) Times a Day Before Meals. 90 tablet 6    pantoprazole (Protonix) 20 MG EC tablet Take 1 tablet by mouth Daily. 90 tablet 1    propranolol (INDERAL) 10 MG tablet Take 1 tablet by mouth 2 (Two) Times a Day. 60 tablet 6     No current facility-administered medications for this visit.     Past Medical History:  Past Medical History:   Diagnosis Date    Anxiety     Chronic hypertension affecting pregnancy 11/21/2023    Depression     Heart valve disease     Noted on previous echo    History of medical problems     POTS    Irritable bowel syndrome     Lactose intolerance     Orthostatic hypotension     Ovarian cyst     Found on one of my prenatal uktrasounds    POTS (postural orthostatic tachycardia syndrome)     Tachycardia     Urinary tract infection     Infrequently     Past Surgical History:  Past Surgical History:   Procedure Laterality Date    MANDIBLE SURGERY  2020    Reconstructive Jaw Surgery    WISDOM TOOTH EXTRACTION  2012     Mental Status Exam:   Appearance: well-groomed, sits upright, age-appropriate, normal habitus  Behavior: calm, cooperative, appropriate in demeanor, appropriate eye-contact  Mood/affect: euthymic / mood-congruent, but appropriate in both range and amplitude  Speech: within expected variance; appropriate rate, appropriate rhythm, appropriate tone; non-pressured  Thought Process: linear, goal-directed; no FOI or JOAN; abstraction intact  Thought Content: coherent, devoid of overt  delusions/perceptual disturbances  SI/HI: denies both SI and HI; exhibits future-orientation, self-advocates appropriately, no regular self-harm, no appreciable intent  Memory: no overt deficits  Orientation: oriented to person/place/time/situation  Concentration: appropriate during interview  Intellectual capacity: presumptively average  Insight: fair by given history/exam  Judgment: appropriate by given history/exam  Psychomotor: no appreciable latency/retardation/agitation/tremor  Gait: deferred    Review of Systems:  Review of Systems    Vital Signs:   There were no vitals taken for this visit.     Lab Results:   Office Visit on 10/31/2024   Component Date Value Ref Range Status    TSH 10/31/2024 0.551  0.270 - 4.200 uIU/mL Final    WBC 10/31/2024 5.75  3.40 - 10.80 10*3/mm3 Final    RBC 10/31/2024 4.69  3.77 - 5.28 10*6/mm3 Final    Hemoglobin 10/31/2024 13.1  12.0 - 15.9 g/dL Final    Hematocrit 10/31/2024 40.4  34.0 - 46.6 % Final    MCV 10/31/2024 86.1  79.0 - 97.0 fL Final    MCH 10/31/2024 27.9  26.6 - 33.0 pg Final    MCHC 10/31/2024 32.4  31.5 - 35.7 g/dL Final    RDW 10/31/2024 14.1  12.3 - 15.4 % Final    RDW-SD 10/31/2024 44.4  37.0 - 54.0 fl Final    MPV 10/31/2024 13.2 (H)  6.0 - 12.0 fL Final    Platelets 10/31/2024 249  140 - 450 10*3/mm3 Final    Glucose 10/31/2024 82  65 - 99 mg/dL Final    BUN 10/31/2024 13  6 - 20 mg/dL Final    Creatinine 10/31/2024 0.97  0.57 - 1.00 mg/dL Final    Sodium 10/31/2024 140  136 - 145 mmol/L Final    Potassium 10/31/2024 3.9  3.5 - 5.2 mmol/L Final    Chloride 10/31/2024 108 (H)  98 - 107 mmol/L Final    CO2 10/31/2024 24.0  22.0 - 29.0 mmol/L Final    Calcium 10/31/2024 9.3  8.6 - 10.5 mg/dL Final    Total Protein 10/31/2024 7.4  6.0 - 8.5 g/dL Final    Albumin 10/31/2024 4.7  3.5 - 5.2 g/dL Final    ALT (SGPT) 10/31/2024 14  1 - 33 U/L Final    AST (SGOT) 10/31/2024 17  1 - 32 U/L Final    Alkaline Phosphatase 10/31/2024 73  39 - 117 U/L Final    Total  Bilirubin 10/31/2024 0.8  0.0 - 1.2 mg/dL Final    Globulin 10/31/2024 2.7  gm/dL Final    A/G Ratio 10/31/2024 1.7  g/dL Final    BUN/Creatinine Ratio 10/31/2024 13.4  7.0 - 25.0 Final    Anion Gap 10/31/2024 8.0  5.0 - 15.0 mmol/L Final    eGFR 10/31/2024 83.3  >60.0 mL/min/1.73 Final    Total Cholesterol 10/31/2024 172  0 - 200 mg/dL Final    Triglycerides 10/31/2024 49  0 - 150 mg/dL Final    HDL Cholesterol 10/31/2024 46  40 - 60 mg/dL Final    LDL Cholesterol  10/31/2024 116 (H)  0 - 100 mg/dL Final    VLDL Cholesterol 10/31/2024 10  5 - 40 mg/dL Final    LDL/HDL Ratio 10/31/2024 2.53   Final   Lab on 09/23/2024   Component Date Value Ref Range Status    HCG Quantitative 09/23/2024 <1.00  mIU/mL Final   Orders Only on 09/13/2024   Component Date Value Ref Range Status    Glucose 09/13/2024 92  65 - 99 mg/dL Final    BUN 09/13/2024 12  6 - 20 mg/dL Final    Creatinine 09/13/2024 0.82  0.57 - 1.00 mg/dL Final    Sodium 09/13/2024 140  136 - 145 mmol/L Final    Potassium 09/13/2024 4.1  3.5 - 5.2 mmol/L Final    Chloride 09/13/2024 104  98 - 107 mmol/L Final    CO2 09/13/2024 24.7  22.0 - 29.0 mmol/L Final    Calcium 09/13/2024 9.8  8.6 - 10.5 mg/dL Final    Total Protein 09/13/2024 7.7  6.0 - 8.5 g/dL Final    Albumin 09/13/2024 4.7  3.5 - 5.2 g/dL Final    ALT (SGPT) 09/13/2024 26  1 - 33 U/L Final    AST (SGOT) 09/13/2024 25  1 - 32 U/L Final    Alkaline Phosphatase 09/13/2024 84  39 - 117 U/L Final    Total Bilirubin 09/13/2024 0.3  0.0 - 1.2 mg/dL Final    Globulin 09/13/2024 3.0  gm/dL Final    A/G Ratio 09/13/2024 1.6  g/dL Final    BUN/Creatinine Ratio 09/13/2024 14.6  7.0 - 25.0 Final    Anion Gap 09/13/2024 11.3  5.0 - 15.0 mmol/L Final    eGFR 09/13/2024 101.9  >60.0 mL/min/1.73 Final    Amylase 09/13/2024 54  28 - 100 U/L Final    Lipase 09/13/2024 27  13 - 60 U/L Final    WBC 09/13/2024 6.60  3.40 - 10.80 10*3/mm3 Final    RBC 09/13/2024 4.86  3.77 - 5.28 10*6/mm3 Final    Hemoglobin 09/13/2024 12.9   12.0 - 15.9 g/dL Final    Hematocrit 09/13/2024 40.1  34.0 - 46.6 % Final    MCV 09/13/2024 82.5  79.0 - 97.0 fL Final    MCH 09/13/2024 26.5 (L)  26.6 - 33.0 pg Final    MCHC 09/13/2024 32.2  31.5 - 35.7 g/dL Final    RDW 09/13/2024 14.5  12.3 - 15.4 % Final    RDW-SD 09/13/2024 42.6  37.0 - 54.0 fl Final    MPV 09/13/2024 14.5 (H)  6.0 - 12.0 fL Final    Platelets 09/13/2024 247  140 - 450 10*3/mm3 Final    H. pylori IgG 09/13/2024 Negative  Negative, Equivocal Final     EKG Results:  No orders to display     Imaging Results:  No Images in the past 120 days found.    ASSESSMENT AND PLAN:    ICD-10-CM ICD-9-CM   1. GEOVANNI (generalized anxiety disorder)  F41.1 300.02   2. Major depressive disorder, recurrent episode, in partial remission  F33.41 296.35   3. Panic disorder  F41.0 300.01       24 y.o. female who presents today for follow-up. We have discussed the interval history and the treatment plan below:      Medication regimen: begin clonidine 0.1 mg BID PRN anxiety/irritability - continue bupropion XL   Monitoring: reviewed labs as populated above  Primary psychotherapy: referred  Follow-up: 2 months  Communications: N/A  Treatment plan: due    TREATMENT PLAN/GOALS: challenge patterns of living conducive to symptom burden, implement recommended regimen as above with augmentative, intermittent supportive psychotherapy to reduce symptom burden. Patient acknowledged and verbally consented to continue treatment.      Billing: This encounter is of moderate complexity based on number/complexity of problems addressed today and risk of complications/morbidity: 2+ stable chronic illnesses and and prescription management.     Electronically signed by Silvano Odom MD, 01/16/25, 1038

## 2025-01-16 ENCOUNTER — TELEMEDICINE (OUTPATIENT)
Dept: PSYCHIATRY | Facility: CLINIC | Age: 25
End: 2025-01-16
Payer: COMMERCIAL

## 2025-01-16 DIAGNOSIS — F33.41 MAJOR DEPRESSIVE DISORDER, RECURRENT EPISODE, IN PARTIAL REMISSION: ICD-10-CM

## 2025-01-16 DIAGNOSIS — F41.0 PANIC DISORDER: ICD-10-CM

## 2025-01-16 DIAGNOSIS — F41.1 GAD (GENERALIZED ANXIETY DISORDER): Primary | ICD-10-CM

## 2025-01-16 RX ORDER — BUPROPION HYDROCHLORIDE 300 MG/1
300 TABLET ORAL EVERY MORNING
Qty: 30 TABLET | Refills: 1 | Status: SHIPPED | OUTPATIENT
Start: 2025-01-16

## 2025-01-16 RX ORDER — CLONIDINE HYDROCHLORIDE 0.1 MG/1
0.1 TABLET ORAL 2 TIMES DAILY
Qty: 180 TABLET | Refills: 0 | Status: SHIPPED | OUTPATIENT
Start: 2025-01-16

## 2025-01-16 NOTE — TREATMENT PLAN
Multi-Disciplinary Problems (from Behavioral Health Treatment Plan)      Active Problems       Problem:  Patient Care Overview (Adult)  Start Date: 01/16/25      Problem Details: Problem Details: Treatment Planning for Ana Paula     Applicable diagnoses/problems:     - Depression: enhance motivation and interest with regard to ego-syntonic items of living via routine building and disruption of inhibitory executive cognitive circuits; challenge withdrawal from ego-syntonic items of living; cultivate radha and satisfaction via a consistent practice of appreciation; consistently practice redirection from intrusive ego-dystonic thoughts towards actionable items to reduce influence these thoughts have in emotions and behavior.  - progress: moderate, progressing  - frequency of intervention: 4-8 weeks as scheduling allows  - duration of treatment: until optimized functional status is met     - Anxiety: enhance engagement with regard to ego-syntonic items of living via routine building and disruption of inhibitory executive cognitive circuits; challenge avoidance of ego-syntonic items of living via disruption to perceived barriers; reduce salience of fears and overwhelm with regard to their effects on thinking and behavior.  - progress: moderate, progressing   - frequency of intervention: 4-8 weeks as scheduling allows  - duration of treatment: until optimized functional status is met     - Grief: emotionally process the absence of her brother and the associated impact in daily life towards a place of acceptance by revisiting events (planning memorial in coming weeks), expressing feelings, leaning on available supports (family and family friends), and by sublimating suffering towards constructive outcomes for herself  - progress: moderate, progressing  - frequency of intervention: 4-8 weeks as scheduling allows  - duration of treatment: until optimized functional status is met        Goal Priority Start Date Expected End  Date End Date    Plan of Care Review -- 01/16/25 07/17/25 --

## 2025-01-30 NOTE — PROGRESS NOTES
Well Woman Visit    CC: Scheduled annual well gyn visit  Chief Complaint   Patient presents with    Annual Exam         HPI:     History of Present Illness  The patient is a 24-year-old female who presents today for an annual exam.    She reports experiencing mild vaginal discharge. She has not noticed any abnormalities in her breasts, such as lumps or bumps. She has been using an intrauterine device (IUD) since September 2024, inserted by Dr. Smith, and reports satisfactory results. Her menstrual cycle occurs approximately every other month, lasting between 7 to 9 days, with the initial 3 days characterized by heavy flow, followed by spotting. She is currently 8 months postpartum and is not breastfeeding. She has been on Wellbutrin for approximately 4 to 5 months.    She expresses concern over a significant decrease in libido following the birth of her first child nearly 3 years ago. She questions whether this could be attributed to the close succession of her pregnancies, as she gave birth to her second child when her first was nearing 1 years of age. She also considers the possibility that her current medication regimen, which includes daily doses of Wellbutrin may be contributing to this issue.    MEDICATIONS  Wellbutrin, propranolol, ProAmatine      History: PMHx, Meds, Allergies, PSHx, Social Hx, and POBHx all reviewed and updated.  Last Pap :   Last Completed Pap Smear       This patient has no relevant Health Maintenance data.          Last MMG :   Last Completed Mammogram       This patient has no relevant Health Maintenance data.           Last Colonoscopy :   Last Completed Colonoscopy       This patient has no relevant Health Maintenance data.              Results          ROS:   General ROS: negative for chills or fatigue  Breast ROS: negative new or changing breast lumps  Gastrointestinal ROS: negative for abdominal pain or appetite loss  Genito-Urinary ROS: negative for difficulty in urination or  "vaginal irritation   Psych ROS: negative for depression      PHYSICAL EXAM:       /83   Pulse 108   Ht 167.6 cm (65.98\")   Wt 55.3 kg (122 lb)   LMP 01/17/2025 Comment: lasting 7-9 days, first 3 days heavy, changing products 2-2.5 hrs on heaviest days  Breastfeeding No   BMI 19.70 kg/m²  Not found.    General- NAD, alert and oriented, appropriate  Psych- Normal mood, good memory  Resp- No labored breathing  Abdomen- Soft, non distended, non tender    Breast Exam:  Breast self awareness counseled  Breast left-  Bilaterally symmetrical, no masses, non tender, no nipple discharge  Breast right- Bilaterally symmetrical, no masses, non tender, no nipple discharge    Pelvic Exam with chaperone present:    External genitalia- Normal female, no lesions  Urethra/meatus- Normal, no masses, non tender  Bladder- Normal, no masses, non tender  Vagina- Normal, no atrophy, no lesions, no discharge.    Cvx- Normal, no lesions, no discharge, No cervical motion tenderness, IUD strings visable 2cm  Uterus- Normal size, shape & consistency.  Non tender, mobile.  Adnexa- No mass, non tender  Anus/Rectum/Perineum- Not performed    Ext- No edema  Skin- No lesions, no rashes, no acanthosis nigricans      ASSESSMENT and PLAN:    Diagnoses and all orders for this visit:    1. Well woman exam with routine gynecological exam (Primary)  -     IGP,rfx Aptima HPV All Pth    2. Vaginal discharge  -     Gardnerella vaginalis, Trichomonas vaginalis, Candida albicans, DNA - Swab, Vagina          Assessment & Plan  1. Annual examination.  She reports a decrease in libido since the birth of her first child 3 years ago, which may be influenced by her current use of Wellbutrin, propranolol, and ProAmatine. She has been on Wellbutrin for approximately 4-5 months. The potential impact of these medications on libido was discussed. She is currently 8 months postpartum and not breastfeeding. She has an IUD placed in September 2024 by Dr. Smith and " experiences periods every other month, lasting 7-9 days with heavy flow for the first 3 days and spotting thereafter. She reports no lumps or bumps in her breasts. A Pap smear will be conducted today. A swab will be taken to test for bacterial vaginosis (BV) due to reported discharge.    2. Decreased libido.  She reports a significant decrease in libido since the birth of her first child 3 years ago. The potential contributing factors include her current medications (Wellbutrin, propranolol, and ProAmatine), postpartum status, and lack of time due to caring for her children and working full-time. She was advised to carve out time for herself and her partner to help address this issue.    PROCEDURE  IUD insertion in September 2024 by Dr. Smith.      Preventative:  1. Annuals every year; Cytology collections per prevailing guidelines.   2. Mammograms begin every year at 41 yo if no abnormalities are found and no family history.  3. Bone density studies begin at 66 yo. If no fracture history or osteoporosis family history.  4. Colonoscopy begins at 46 yo. Repeat every ten years if negative and no family history.  5. Calcium of 8804-1950 mg/day in split dosing  6. Vitamin D 400-800 IU/day  7. All other preventative health recommendations will be managed by the patients Primary care physician.    She understands the importance of having any ordered tests to be performed in a timely fashion.  The risks of not performing them include, but are not limited to, advanced cancer stages, bone loss from osteoporosis and/or subsequent increase in morbidity and/or mortality.  She is encouraged to review her results online and/or contact or office if she has questions.     Follow Up:  Return in about 1 year (around 2/4/2026) for Annual physical.    Patient or patient representative verbalized consent for the use of Ambient Listening during the visit with  Anais Verdugo DO for chart documentation. 2/4/2025  11:57 MITRA Manzo  DO Lucina  02/04/2025    [facility]  [address] [place] [address]  Dept: [phone]  Dept Fax: [phone]  Loc: [phone]  Loc Fax: [phone]

## 2025-02-04 ENCOUNTER — OFFICE VISIT (OUTPATIENT)
Dept: OBSTETRICS AND GYNECOLOGY | Facility: CLINIC | Age: 25
End: 2025-02-04
Payer: COMMERCIAL

## 2025-02-04 VITALS
DIASTOLIC BLOOD PRESSURE: 83 MMHG | WEIGHT: 122 LBS | SYSTOLIC BLOOD PRESSURE: 127 MMHG | HEIGHT: 66 IN | BODY MASS INDEX: 19.61 KG/M2 | HEART RATE: 108 BPM

## 2025-02-04 DIAGNOSIS — Z01.419 WELL WOMAN EXAM WITH ROUTINE GYNECOLOGICAL EXAM: Primary | ICD-10-CM

## 2025-02-04 DIAGNOSIS — N89.8 VAGINAL DISCHARGE: ICD-10-CM

## 2025-02-04 PROCEDURE — 87510 GARDNER VAG DNA DIR PROBE: CPT | Performed by: STUDENT IN AN ORGANIZED HEALTH CARE EDUCATION/TRAINING PROGRAM

## 2025-02-04 PROCEDURE — G0123 SCREEN CERV/VAG THIN LAYER: HCPCS | Performed by: STUDENT IN AN ORGANIZED HEALTH CARE EDUCATION/TRAINING PROGRAM

## 2025-02-04 PROCEDURE — 87660 TRICHOMONAS VAGIN DIR PROBE: CPT | Performed by: STUDENT IN AN ORGANIZED HEALTH CARE EDUCATION/TRAINING PROGRAM

## 2025-02-04 PROCEDURE — 87480 CANDIDA DNA DIR PROBE: CPT | Performed by: STUDENT IN AN ORGANIZED HEALTH CARE EDUCATION/TRAINING PROGRAM

## 2025-02-05 RX ORDER — METRONIDAZOLE 500 MG/1
500 TABLET ORAL 2 TIMES DAILY
Qty: 14 TABLET | Refills: 0 | Status: SHIPPED | OUTPATIENT
Start: 2025-02-05

## 2025-02-06 ENCOUNTER — PATIENT MESSAGE (OUTPATIENT)
Dept: OBSTETRICS AND GYNECOLOGY | Facility: CLINIC | Age: 25
End: 2025-02-06

## 2025-02-06 LAB
CONV .: NORMAL
CYTOLOGIST CVX/VAG CYTO: NORMAL
CYTOLOGY CVX/VAG DOC CYTO: NORMAL
CYTOLOGY CVX/VAG DOC THIN PREP: NORMAL
DX ICD CODE: NORMAL
OTHER STN SPEC: NORMAL
SERVICE CMNT-IMP: NORMAL
STAT OF ADQ CVX/VAG CYTO-IMP: NORMAL

## 2025-02-11 ENCOUNTER — OFFICE VISIT (OUTPATIENT)
Dept: GASTROENTEROLOGY | Facility: CLINIC | Age: 25
End: 2025-02-11
Payer: COMMERCIAL

## 2025-02-11 ENCOUNTER — TELEPHONE (OUTPATIENT)
Dept: GASTROENTEROLOGY | Facility: CLINIC | Age: 25
End: 2025-02-11
Payer: COMMERCIAL

## 2025-02-11 VITALS
DIASTOLIC BLOOD PRESSURE: 72 MMHG | OXYGEN SATURATION: 100 % | HEART RATE: 61 BPM | WEIGHT: 125 LBS | BODY MASS INDEX: 20.09 KG/M2 | HEIGHT: 66 IN | SYSTOLIC BLOOD PRESSURE: 118 MMHG

## 2025-02-11 DIAGNOSIS — R10.30 LOWER ABDOMINAL PAIN: ICD-10-CM

## 2025-02-11 DIAGNOSIS — R12 HEARTBURN: ICD-10-CM

## 2025-02-11 DIAGNOSIS — R11.0 NAUSEA: Primary | ICD-10-CM

## 2025-02-11 DIAGNOSIS — R19.7 DIARRHEA, UNSPECIFIED TYPE: ICD-10-CM

## 2025-02-11 DIAGNOSIS — K59.00 CONSTIPATION, UNSPECIFIED CONSTIPATION TYPE: ICD-10-CM

## 2025-02-11 DIAGNOSIS — R68.81 EARLY SATIETY: ICD-10-CM

## 2025-02-11 DIAGNOSIS — K62.5 RECTAL BLEEDING: ICD-10-CM

## 2025-02-11 RX ORDER — SOD SULF/POT CHLORIDE/MAG SULF 1.479 G
12 TABLET ORAL TAKE AS DIRECTED
Qty: 24 TABLET | Refills: 0 | Status: SHIPPED | OUTPATIENT
Start: 2025-02-11

## 2025-02-11 NOTE — PROGRESS NOTES
Chief Complaint     Nausea, Rectal Bleeding, Abdominal Pain, and alternating bowel habits (Worsening last couple of months)    History of Present Illness     Ana Paula Amanda is a 24 y.o. female who presents to [facility] on referral from PARIS Guerin for a gastroenterology evaluation of nausea, rectal bleeding, abdominal pain and irregular bowel movements.    Reports that she has suffered with stomach issues for several years.  Her baseline is constipation.  In the past she has tried Linzess 72 mcg daily however did not tolerate this due to severe diarrhea.  She was also prescribed Amitiza but unfortunately also experienced diarrhea and nausea.  She has tried several over-the-counter medications for relief of constipation including fiber supplements, probiotics and MiraLAX.  Most recently she began taking fiber capsules - 500 mg BID.      She has also tried dietary modifications by eliminating dairy, processed foods and has reduced gluten intake.  Unfortunately she is continuing to experience alternating between constipation and diarrhea.  States that when constipation is present she will often only have a bowel movement 2 times per week.  When diarrhea is present she experiences 5-6 bowel movements per day.    Reports intermittent rectal bleeding.  Had a thrombosed hemorrhoid a few weeks ago that is improved now.    Reports daily nausea and early satiety that's been present for a long time.      Admits heartburn that's been present since being pregnant with her 3 year old.  This is controlled with Protonix 20 mg daily.      History      Past Medical History:   Diagnosis Date    Anxiety     Chronic hypertension affecting pregnancy 11/21/2023    Depression     Heart valve disease     Noted on previous echo    History of medical problems     POTS    Irritable bowel syndrome     Lactose intolerance     Orthostatic hypotension     Ovarian cyst     Found on one of my prenatal  uktrasounds    POTS (postural orthostatic tachycardia syndrome)     Tachycardia     Urinary tract infection     Infrequently       Past Surgical History:   Procedure Laterality Date    MANDIBLE SURGERY  2020    Reconstructive Jaw Surgery    WISDOM TOOTH EXTRACTION  2012       Family History   Problem Relation Age of Onset    OCD Mother     Depression Mother     Anxiety disorder Mother     Heart disease Mother     Hyperlipidemia Mother     Thyroid disease Mother     Depression Father     Hyperlipidemia Father     Hypertension Father     No Known Problems Sister     Drug abuse Brother     ADD / ADHD Brother     No Known Problems Maternal Aunt     Clotting disorder Paternal Aunt     No Known Problems Maternal Uncle     No Known Problems Paternal Uncle     Heart disease Maternal Grandfather     Melanoma Maternal Grandfather     Heart disease Maternal Grandmother     Lung cancer Maternal Grandmother     Stroke Paternal Grandfather     Heart disease Paternal Grandmother     Hypertension Paternal Grandmother     Clotting disorder Paternal Grandmother     Heart attack Paternal Grandmother     No Known Problems Cousin     No Known Problems Other     Breast cancer Neg Hx     Ovarian cancer Neg Hx     Uterine cancer Neg Hx     Colon cancer Neg Hx     Deep vein thrombosis Neg Hx     Pulmonary embolism Neg Hx     Alcohol abuse Neg Hx     Bipolar disorder Neg Hx     Dementia Neg Hx     Paranoid behavior Neg Hx     Schizophrenia Neg Hx     Seizures Neg Hx     Self-Injurious Behavior  Neg Hx     Suicide Attempts Neg Hx         Current Medications        Current Outpatient Medications:     buPROPion XL (Wellbutrin XL) 300 MG 24 hr tablet, Take 1 tablet by mouth Every Morning., Disp: 30 tablet, Rfl: 1    cloNIDine (Catapres) 0.1 MG tablet, Take 1 tablet by mouth 2 (Two) Times a Day., Disp: 180 tablet, Rfl: 0    Levonorgestrel (MIRENA) 20 MCG/DAY intrauterine device IUD, To be inserted one time by prescriber. Route intrauterine.,  "Disp: , Rfl:     metroNIDAZOLE (FLAGYL) 500 MG tablet, Take 1 tablet by mouth 2 (Two) Times a Day., Disp: 14 tablet, Rfl: 0    midodrine (PROAMATINE) 5 MG tablet, Take 1 tablet by mouth 3 (Three) Times a Day Before Meals., Disp: 90 tablet, Rfl: 6    pantoprazole (Protonix) 20 MG EC tablet, Take 1 tablet by mouth Daily., Disp: 90 tablet, Rfl: 1    propranolol (INDERAL) 10 MG tablet, Take 1 tablet by mouth 2 (Two) Times a Day., Disp: 60 tablet, Rfl: 6    Sodium Sulfate-Mag Sulfate-KCl (Sutab) 0559-321-047 MG tablet, Take 12 tablets by mouth Take As Directed. Take 12 tablets at 6 pm and 12 tablets 4 hours prior to procedure., Disp: 24 tablet, Rfl: 0     Allergies     Allergies   Allergen Reactions    Lactose Intolerance (Gi) GI Intolerance       Social History       Social History     Social History Narrative    Not on file       Immunizations     Immunization:  Immunization History   Administered Date(s) Administered    COVID-19 (MODERNA) 1st,2nd,3rd Dose Monovalent 12/24/2020, 01/19/2021, 01/06/2022    Fluzone  >6mos 10/31/2024    Fluzone (or Fluarix & Flulaval for VFC) >6mos 10/19/2023    Hepatitis B Adult/Adolescent IM 07/26/2021    Tdap 07/22/2020, 03/29/2022, 04/24/2024          Objective     Objective     Vital Signs:   /72 (BP Location: Left arm, Patient Position: Sitting)   Pulse 61   Ht 167.6 cm (65.98\")   Wt 56.7 kg (125 lb)   SpO2 100%   BMI 20.19 kg/m²       Physical Exam    Results      Result Review :   The following data was reviewed by: PARIS Alvarenga on 02/11/2025:    CBC w/diff          6/2/2024    01:31 9/13/2024    16:36 10/31/2024    09:58   CBC w/Diff   WBC 9.21  6.60  5.75    RBC 4.33  4.86  4.69    Hemoglobin 10.7  12.9  13.1    Hematocrit 34.2  40.1  40.4    MCV 79.0  82.5  86.1    MCH 24.7  26.5  27.9    MCHC 31.3  32.2  32.4    RDW 14.4  14.5  14.1    Platelets 210  247  249      CMP          6/2/2024    01:31 9/13/2024    16:36 10/31/2024    09:58   CMP   Glucose 80  " 92  82    BUN 9  12  13    Creatinine 0.58  0.82  0.97    EGFR 129.0  101.9  83.3    Sodium 138  140  140    Potassium 3.9  4.1  3.9    Chloride 105  104  108    Calcium 8.7  9.8  9.3    Total Protein 6.5  7.7  7.4    Albumin 3.7  4.7  4.7    Globulin 2.8  3.0  2.7    Total Bilirubin 0.3  0.3  0.8    Alkaline Phosphatase 194  84  73    AST (SGOT) 14  25  17    ALT (SGPT) 8  26  14    Albumin/Globulin Ratio 1.3  1.6  1.7    BUN/Creatinine Ratio 15.5  14.6  13.4    Anion Gap 12.7  11.3  8.0        Lipase   Lipase   Date Value Ref Range Status   09/13/2024 27 13 - 60 U/L Final     9/13/2024 H. pylori IgG-negative.    9/17/2024 gallbladder ultrasound-liver measures 14.4 cm.  Normal echogenicity of the liver.  Normal gallbladder without stones.       Assessment and Plan        Assessment and Plan    Diagnoses and all orders for this visit:    1. Nausea (Primary)  -     Alpha-Gal IgE Panel; Future  -     Food Allergy Profile; Future  -     Celiac Panel Reflex To Titer; Future  -     Case Request; Standing  -     Case Request    2. Heartburn  -     Case Request; Standing  -     Case Request    3. Early satiety  -     Case Request; Standing  -     Case Request    4. Lower abdominal pain  -     Alpha-Gal IgE Panel; Future  -     Food Allergy Profile; Future  -     Celiac Panel Reflex To Titer; Future  -     Case Request; Standing  -     Case Request    5. Rectal bleeding  -     Case Request; Standing  -     Case Request    6. Diarrhea, unspecified type  -     Case Request; Standing  -     Case Request    7. Constipation, unspecified constipation type  -     Case Request; Standing  -     Case Request    Other orders  -     Follow Anesthesia Guidelines / Protocol; Future  -     Verify NPO; Standing  -     Verify Bowel Prep Was Successful; Standing  -     Give Tap Water Enema If Bowel Prep Insufficient; Standing  -     Sodium Sulfate-Mag Sulfate-KCl (Sutab) 6791-901-003 MG tablet; Take 12 tablets by mouth Take As Directed. Take  12 tablets at 6 pm and 12 tablets 4 hours prior to procedure.  Dispense: 24 tablet; Refill: 0        ESOPHAGOGASTRODUODENOSCOPY (N/A), COLONOSCOPY (N/A)  The risk of the endoscopy were discussed in detail. Possible risks/complications, benefits, and alternatives to surgical or invasive procedure have been explained to patient and/or legal guardian; risks include bleeding, infection, and perforation. Patient has been evaluated and can tolerate anesthesia and/or sedation.      Follow Up        Follow Up   Return in about 6 months (around 8/11/2025).  Patient was given instructions and counseling regarding her condition or for health maintenance advice. Please see specific information pulled into the AVS if appropriate.

## 2025-02-11 NOTE — TELEPHONE ENCOUNTER
2025    Dear DR ROBERSON,      Patient Name: Ana Paula Amanda  : 2000      This patient is waiting to have a Colonoscopy and Esophagogastroduodenoscopy which I will perform at [facility] on 25. Please respond to this request noting your recommendations regarding clearance from a Cardiac  standpoint.  You may contact our office at [phone] option 3 with any questions. I appreciate your prompt response in this matter. Please return this form to our office as soon as possible to [phone].    ____ I approve my patient from a Cardiac  standpoint    ____ I do NOT approve my patient from a Cardiac  standpoint at this time    Please inform our office if the patient requires additional follow-up from your office prior to scheduled procedure date.      Please specify clearance expiration date:____________________________________      Approving physician name (please print): _____________________________________________      Approving physician signature: ________________________________ Date:________________  Sincerely,  [facility]   Dr. ARABELLA ALATORRE          Please fax approval or denial to our office as soon as possible.

## 2025-02-11 NOTE — TELEPHONE ENCOUNTER
Procedure: Colonoscopy and/or EGD     Med Directive: NA     PMH: POTS, palpitations      Last Seen: 9/19/2024

## 2025-02-12 ENCOUNTER — LAB (OUTPATIENT)
Dept: LAB | Facility: HOSPITAL | Age: 25
End: 2025-02-12
Payer: COMMERCIAL

## 2025-02-12 DIAGNOSIS — R11.0 NAUSEA: ICD-10-CM

## 2025-02-12 DIAGNOSIS — R10.30 LOWER ABDOMINAL PAIN: ICD-10-CM

## 2025-02-12 PROCEDURE — 86003 ALLG SPEC IGE CRUDE XTRC EA: CPT

## 2025-02-12 PROCEDURE — 82785 ASSAY OF IGE: CPT

## 2025-02-12 PROCEDURE — 86364 TISS TRNSGLTMNASE EA IG CLAS: CPT

## 2025-02-12 PROCEDURE — 36415 COLL VENOUS BLD VENIPUNCTURE: CPT

## 2025-02-12 PROCEDURE — 86258 DGP ANTIBODY EACH IG CLASS: CPT

## 2025-02-12 PROCEDURE — 86008 ALLG SPEC IGE RECOMB EA: CPT

## 2025-02-12 PROCEDURE — 82784 ASSAY IGA/IGD/IGG/IGM EACH: CPT

## 2025-02-13 LAB
GLIADIN PEPTIDE IGA SER-ACNC: 5 UNITS (ref 0–19)
IGA SERPL-MCNC: 198 MG/DL (ref 87–352)
TTG IGA SER-ACNC: <2 U/ML (ref 0–3)

## 2025-02-15 LAB
ALPHA-GAL IGE QN: <0.1 KU/L
BEEF IGE QN: <0.1 KU/L
CONV CLASS DESCRIPTION: NORMAL
IGE SERPL-ACNC: 44 IU/ML (ref 6–495)
LAMB IGE QN: <0.1 KU/L
PORK IGE QN: <0.1 KU/L

## 2025-02-16 LAB
CLAM IGE QN: <0.1 KU/L
CODFISH IGE QN: <0.1 KU/L
CONV CLASS DESCRIPTION: NORMAL
CORN IGE QN: <0.1 KU/L
COW MILK IGE QN: <0.1 KU/L
EGG WHITE IGE QN: <0.1 KU/L
PEANUT IGE QN: <0.1 KU/L
SCALLOP IGE QN: <0.1 KU/L
SESAME SEED IGE QN: <0.1 KU/L
SHRIMP IGE QN: <0.1 KU/L
SOYBEAN IGE QN: <0.1 KU/L
WALNUT IGE QN: <0.1 KU/L
WHEAT IGE QN: <0.1 KU/L

## 2025-02-24 RX ORDER — MIDODRINE HYDROCHLORIDE 5 MG/1
5 TABLET ORAL
Qty: 90 TABLET | Refills: 6 | Status: SHIPPED | OUTPATIENT
Start: 2025-02-24

## 2025-02-24 NOTE — TELEPHONE ENCOUNTER
PT WANTS SCRIPT SENT TO [facility] AND NOT [facility].     Rx Refill Note  Requested Prescriptions     Pending Prescriptions Disp Refills    midodrine (PROAMATINE) 5 MG tablet 90 tablet 6     Sig: Take 1 tablet by mouth 3 (Three) Times a Day Before Meals.        LAST OFFICE VISIT:  09/19/2024     NEXT OFFICE VISIT:  9/16/2025     Does the medication requests match the last office note:    [x] Yes   [] No    Does this refill request meet protocol details for MA to approve:     [x] Yes   [] No

## 2025-03-05 RX ORDER — SOD SULF/POT CHLORIDE/MAG SULF 1.479 G
12 TABLET ORAL TAKE AS DIRECTED
Qty: 24 TABLET | Refills: 0 | Status: SHIPPED | OUTPATIENT
Start: 2025-03-05

## 2025-03-06 ENCOUNTER — TELEPHONE (OUTPATIENT)
Dept: GASTROENTEROLOGY | Facility: CLINIC | Age: 25
End: 2025-03-06
Payer: COMMERCIAL

## 2025-03-06 NOTE — TELEPHONE ENCOUNTER
Verify source of procedure(s): Office visit  TIME OUT-CONFIRM CORRECT PROCEDURE: EGD & Colonoscopy  Cardiology: [facility]  Pulmonology: no  Blood thinner: no  GLP-1: no    Cardiac clearance from Dr Grullon received on 2/12/25 (TE 2/11), expires in 60 days.

## 2025-03-17 ENCOUNTER — TELEMEDICINE (OUTPATIENT)
Dept: PSYCHIATRY | Facility: CLINIC | Age: 25
End: 2025-03-17
Payer: COMMERCIAL

## 2025-03-17 ENCOUNTER — TELEPHONE (OUTPATIENT)
Dept: GASTROENTEROLOGY | Facility: CLINIC | Age: 25
End: 2025-03-17
Payer: COMMERCIAL

## 2025-03-17 DIAGNOSIS — F33.1 MAJOR DEPRESSIVE DISORDER, RECURRENT EPISODE, MODERATE: ICD-10-CM

## 2025-03-17 DIAGNOSIS — F41.1 GAD (GENERALIZED ANXIETY DISORDER): Primary | ICD-10-CM

## 2025-03-17 DIAGNOSIS — F43.21 COMPLICATED BEREAVEMENT: ICD-10-CM

## 2025-03-17 DIAGNOSIS — F41.0 PANIC DISORDER: ICD-10-CM

## 2025-03-17 PROCEDURE — 99214 OFFICE O/P EST MOD 30 MIN: CPT | Performed by: PSYCHIATRY & NEUROLOGY

## 2025-03-17 RX ORDER — DESVENLAFAXINE 50 MG/1
50 TABLET, FILM COATED, EXTENDED RELEASE ORAL DAILY
Qty: 30 TABLET | Refills: 1 | Status: SHIPPED | OUTPATIENT
Start: 2025-03-17

## 2025-03-17 RX ORDER — SODIUM, POTASSIUM,MAG SULFATES 17.5-3.13G
1 SOLUTION, RECONSTITUTED, ORAL ORAL EVERY 12 HOURS
Qty: 354 ML | Refills: 0 | Status: SHIPPED | OUTPATIENT
Start: 2025-03-17

## 2025-03-17 RX ORDER — LORAZEPAM 0.5 MG/1
0.5 TABLET ORAL DAILY PRN
Qty: 15 TABLET | Refills: 1 | Status: SHIPPED | OUTPATIENT
Start: 2025-03-17

## 2025-03-17 NOTE — PROGRESS NOTES
"[facility]  Follow-up Visit    Chief Complaint:  \"I've struggled with anxiety and depression for basically as long as I can remember... 2 years ago my brother  of an overdose... basically since then everything has gotten worse.\"    History of Present Illness: Ana Paula Amanda is a 24 y.o. female who presents today for follow-up. Last seen by this practice:  at which time clonidine was started. Services today rendered via telehealth ([employer]) for which the patient provided informed consent. Patient is aware she can refuse to be seen remotely at any time. Patient was located in a secure, remote environment (at home) as was the provider (in-office). I confirmed the patient's identity prior to evaluation and reiterated my credentials; there were no technical issues during the session today.    Current treatment regimen includes:   - bupropion  mg QAM  - clonidine 0.1 mg BID PRN anxiety/irritability  - via another provider: propranolol 10 mg BID    Ana Paula presents on time and unaccompanied in no acute distress and engages with me appropriately. Today she reports she's doing alright all considered. Depressive and anxious symptoms are inadequately managed with current interventions. She doesn't feel her current regimen is especially tolerable, is amenable to make changes today. She inquires about cariprazine for treatment of MDD; I've advised this could very well be helpful, but that insurance coverage may not be feasible given lack of prior trials in antipsychotic treatment for mood symptoms. She is amenable instead to explore an SNRI and to restart rescue lorazepam that had been helpful in the past.  - sleep: generally fair - no change  - appetite: poor - reports around 8 lb weight loss since last visit  - medication adverse effects: dizziness with clonidine     Interval History and Clinical Commentary:   Ego-syntonic. Ana Paula presents in a fashion consistent with " prior assessments with regard to MSE.    Her daughter was diagnosed with autism and her family is navigating this. The local newspaper published an article about her brother's passing without any family's permission.    Axis I: MDD, GEOVANNI, panic disorder  Axis II: defer  Axis III: defer  Axis IV: housing stress, familial stress  Axis V: 65    Differential considerations: N/A    Adherence:  Treatment adherence is appropriate; issues in this regard have included: N/A. Patient is advised not to misuse prescribed medications or to use them with any exogenous substances that aren't disclosed to this provider as they may interact with the regimen to the patient's detriment. The importance of adherence to the recommended treatment and interval follow-up appointments has been emphasized.     Education:  I have counseled Ana Paula with regard to diagnoses and the recommended treatment regimen as documented below: I will be prescribing desvenlafaxine for MDD, GEOVANNI. Patient has been advised that SRIs may take up to 6-8 weeks for full effect and have a possibility of exacerbating mood/anxiety issues for which they are prescribed to the degree that, in some cases, their use may lead to acute suicidality. Patient contracts for safety appropriately. More common SE - sexual dysfunction, GI upset, tremors - were also reviewed. Patient was advised of potential for development of serotonin syndrome (as well as warning signs for onset) with concomitant use of multiple serotonergic agents and to be sure their health providers are aware this medication is being taken to mitigate this risk. Patient was advised that there is evidence to suggest use of SRIs in pregnancy have been associated with persistent pulmonary hypertension in newborns. I will be prescribing lorazepam for anxiety. Patient has been advised that long-term use of this agent can foster tachyphylaxis, dependence, and severe/life-threatening withdrawal with abrupt  discontinuation - this agent will be used sparingly and as a rescue during periods of severe anxiety to augment more regular treatment options. Patient has been advised that this agent can contribute to falls, confusion, sedation, and stunted psychological convalescence. Patient has been made aware of the significant diminishment to respiratory drive when this agent is used in combination with opioids. Patient has been made aware this medication should be considered for taper to discontinuation in the event of pregnancy and that breastfeeding should not occur while using this agent. I've advised to refrain from using this medication prior to driving or operating machinery. I have specifically reviewed issues related to misuse and diversion with the patient today. I have advised that because medications can elicit idiosyncratic reactions in different individuals that SE may present in ways that haven't been discussed. I have reiterated the importance of discussing with me any clinical changes that could represent potential adverse effects regarding the medication regimen.    Patient acknowledges the diagnoses per my rendered interpretation. Patient is agreeable to call 911 or go to the nearest ER should she become concerned for her own safety and/or the safety of those around her. Patient demonstrates understanding of potential risks/benefits/side effects associated with the recommended treatment regimen and is amenable to proceed in the fashion outlined below. Patient has been encouraged to cultivate constructive patterns of living including limiting daily caffeine intake, hydrating appropriately, regularly eating nutritious foods, engaging sleep hygiene practices, engaging in appropriate exposure to sunlight, engaging with hobbies in balance with life necessities, and exercising appropriate to their capacity to do so.    Risk:  There is no significant change to risk profile discernible during today's evaluation -  do note that this is subject to change with the Caodaism of new stressors, treatment non-adherence, use of substances, and/or new medical ails. There is no appreciable evidence of intent for harm to self or others. There are no appreciable indices of jaye/psychosis.    Contraception and mitigation of potential teratogenicity: uses Mirena. I have advised there are risks taking any medication during pregnancy and, unfortunately, these risks are poorly elaborated due to ethical concerns with studying medications in pregnant women. I have advised that in the case of pregnancy risk may be beyond what I'm able to advise and the general approach is that medication use should only be considered if potential benefits outweigh the possibility of risks by the patient's measure.    Psychotherapy:  - Time: N/A  - interventions employed: the therapeutic alliance was strengthened to encourage the patient to express their thoughts and feelings freely. Esteem building was enhanced through praise, reassurance, normalizing/challenging, and encouragement as appropriate. Coping skills were enhanced to build distress tolerance skills and emotional regulation. Allowed patient to freely discuss issues without interruption or judgement with unconditional positive regard, active listening skills, and empathy. Provided a safe, confidential environment to facilitate the development of a positive therapeutic relationship and encourage open, honest communication. Assisted patient in processing session content; acknowledged and normalized/addressed, as appropriate, patient’s thoughts, feelings, and concerns by utilizing a person-centered approach in efforts to build appropriate rapport and a positive therapeutic relationship.   - Diagnoses: see assessment and plan below  - Symptoms: see subjective above  - Goals              - patient: improve mood, mitigate anxiety, bolster functional capacity              - provider: challenge patterns of living  "conducive to pathology, strengthen defenses, promote problems solving, restore adaptive functioning and provide symptom relief.  - Treatment plan: continue supportive psychotherapy in subsequent appointments to provide symptom relief; see assessment and plan below for additional details:              - iteration: 1              - progress: partial              - (X)illumination, (X)contextualization, (working)detection, (working)development, (-)elaboration, (-)refinement  - functional status: fair  - mental status exam: as below  - prognosis: good    Psychiatric History:  Diagnoses: depression and anxiety  Outpatient history: denies  Inpatient history: denies  Medication trials: hydroxyzine, trazodone, lorazepam (was helpful), sertraline (GI distress), fluoxetine, escitalopram (headaches), bupropion XL (appetite suppression), clonidine (dizziness)  Other treatment modalities: has done some therapy in the past  Presenting regimen: N/A  Self harm: denies  Suicide attempts: denies     Substance Abuse History:   Types/methods/frequency: marijuana in high school, not since     Social History:  Residence: lives in an apartment with her  (2023), 3 YO daughter, and son (Eduardo, born 2024)  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  Education: RN + NP  Pertinent developmental history: denies; denies abuse hx  Pertinent legal history: denies  Hobbies/interests: chayo, reading  Gnosticism: \"in a gray space\"; raised Anglican (non-[employer])  Exercise: denies  Dietary habits: defer  Sleep hygiene: defer  Social habits: no pertinent issues  Sunlight: no concern for under-exposure  Caffeine intake: no pertinent issues; 1 cup of coffee in the morning, no tea, occasional soda, rare energy drink  Hydration habits: no pertinent issues   history: denies    Social History     Socioeconomic History    Marital status:    Tobacco Use    Smoking status: Never     Passive exposure: Never    Smokeless " tobacco: Never   Vaping Use    Vaping status: Never Used   Substance and Sexual Activity    Alcohol use: Not Currently     Comment: socially    Drug use: Never    Sexual activity: Yes     Partners: Male     Birth control/protection: I.U.D.     Comment: CINDI 9-2024     Tobacco use counseling/intervention: N/A, patient does not use tobacco; patient has been counseled with regard to risks of tobacco use.    PHQ-9 Depression Screening  PHQ-9 Total Score:       Little interest or pleasure in doing things?     Feeling down, depressed, or hopeless?     PHQ-2 Total Score     Trouble falling or staying asleep, or sleeping too much?     Feeling tired or having little energy?     Poor appetite or overeating?     Feeling bad about yourself - or that you are a failure or have let yourself or your family down?     Trouble concentrating on things, such as reading the newspaper or watching television?     Moving or speaking so slowly that other people could have noticed? Or the opposite - being so fidgety or restless that you have been moving around a lot more than usual?     Thoughts that you would be better off dead, or of hurting yourself in some way?     PHQ-9 Total Score     If you checked off any problems, how difficult have these problems made it for you to do your work, take care of things at home, or get along with other people?         Change in PHQ-9 since last measure: N/A (13)    GEOVANNI-7       Change in GEOVANNI-7 since last measure: N/A (19)    Problem List:  Patient Active Problem List   Diagnosis    Irritable bowel syndrome with both constipation and diarrhea    GEOVANNI (generalized anxiety disorder)    Major depressive disorder, recurrent episode, moderate    Panic disorder    Nausea    Heartburn    Early satiety    Lower abdominal pain    Rectal bleeding    Diarrhea    Constipation     Allergy:   Allergies   Allergen Reactions    Lactose Intolerance (Gi) GI Intolerance      Discontinued Medications:  Medications  Discontinued During This Encounter   Medication Reason    metroNIDAZOLE (FLAGYL) 500 MG tablet     cloNIDine (Catapres) 0.1 MG tablet     buPROPion XL (Wellbutrin XL) 300 MG 24 hr tablet      Current Medications:   Current Outpatient Medications   Medication Sig Dispense Refill    desvenlafaxine (PRISTIQ) 50 MG 24 hr tablet Take 1 tablet by mouth Daily. 30 tablet 1    Levonorgestrel (MIRENA) 20 MCG/DAY intrauterine device IUD To be inserted one time by prescriber. Route intrauterine.      LORazepam (ATIVAN) 0.5 MG tablet Take 1 tablet by mouth Daily As Needed for Anxiety. 15 tablet 1    midodrine (PROAMATINE) 5 MG tablet Take 1 tablet by mouth 3 (Three) Times a Day Before Meals. 90 tablet 6    pantoprazole (Protonix) 20 MG EC tablet Take 1 tablet by mouth Daily. 90 tablet 1    propranolol (INDERAL) 10 MG tablet Take 1 tablet by mouth 2 (Two) Times a Day. 60 tablet 6    Sodium Sulfate-Mag Sulfate-KCl (Sutab) 4473-967-456 MG tablet Take 12 tablets at 6 pm and 12 tablets 4 hours prior to procedure. 24 tablet 0    sodium-potassium-magnesium sulfates (Suprep Bowel Prep Kit) 17.5-3.13-1.6 GM/177ML solution oral solution Take 1 bottle by mouth Every 12 (Twelve) Hours. 354 mL 0     No current facility-administered medications for this visit.     Past Medical History:  Past Medical History:   Diagnosis Date    Anxiety     Chronic hypertension affecting pregnancy 11/21/2023    Depression     Heart valve disease     Noted on previous echo    History of medical problems     POTS    Irritable bowel syndrome     Lactose intolerance     Orthostatic hypotension     Ovarian cyst     Found on one of my prenatal uktrasounds    POTS (postural orthostatic tachycardia syndrome)     Tachycardia     Urinary tract infection     Infrequently     Past Surgical History:  Past Surgical History:   Procedure Laterality Date    MANDIBLE SURGERY  2020    Reconstructive Jaw Surgery    WISDOM TOOTH EXTRACTION  2012     Mental Status Exam:    Appearance: well-groomed, sits upright, age-appropriate, normal habitus  Behavior: calm, cooperative, appropriate in demeanor, appropriate eye-contact  Mood/affect: euthymic / mood-congruent, but appropriate in both range and amplitude  Speech: within expected variance; appropriate rate, appropriate rhythm, appropriate tone; non-pressured  Thought Process: linear, goal-directed; no FOI or JOAN; abstraction intact  Thought Content: coherent, devoid of overt delusions/perceptual disturbances  SI/HI: denies both SI and HI; exhibits future-orientation, self-advocates appropriately, no regular self-harm, no appreciable intent  Memory: no overt deficits  Orientation: oriented to person/place/time/situation  Concentration: appropriate during interview  Intellectual capacity: presumptively average  Insight: fair by given history/exam  Judgment: appropriate by given history/exam  Psychomotor: no appreciable latency/retardation/agitation/tremor  Gait: deferred    Review of Systems:  Review of Systems    Vital Signs:   There were no vitals taken for this visit.     Lab Results:   Lab on 02/12/2025   Component Date Value Ref Range Status    Class Description 02/12/2025 Comment   Final        Levels of Specific IgE       Class  Description of Class      ---------------------------  -----  --------------------                     < 0.10         0         Negative             0.10 -    0.31         0/I       Equivocal/Low             0.32 -    0.55         I         Low             0.56 -    1.40         II        Moderate             1.41 -    3.90         III       High             3.91 -   19.00         IV        Very High            19.01 -  100.00         V         Very High                    >100.00         VI        Very High    IgE 02/12/2025 44  6 - 495 IU/mL Final    Pork 02/12/2025 <0.10  Class 0 kU/L Final    Beef 02/12/2025 <0.10  Class 0 kU/L Final    Lamb 02/12/2025 <0.10  Class 0 kU/L Final    [id number] Alpha-Gal  02/12/2025 <0.10  Class 0 kU/L Final    Class Description 02/12/2025 Comment   Final        Levels of Specific IgE       Class  Description of Class      ---------------------------  -----  --------------------                     < 0.10         0         Negative             0.10 -    0.31         0/I       Equivocal/Low             0.32 -    0.55         I         Low             0.56 -    1.40         II        Moderate             1.41 -    3.90         III       High             3.91 -   19.00         IV        Very High            19.01 -  100.00         V         Very High                    >100.00         VI        Very High    Egg White 02/12/2025 <0.10  Class 0 kU/L Final    Peanut 02/12/2025 <0.10  Class 0 kU/L Final    Soybean 02/12/2025 <0.10  Class 0 kU/L Final    Milk, Cow's 02/12/2025 <0.10  Class 0 kU/L Final    Clams 02/12/2025 <0.10  Class 0 kU/L Final    Shrimp 02/12/2025 <0.10  Class 0 kU/L Final    [place] 02/12/2025 <0.10  Class 0 kU/L Final    CodFish 02/12/2025 <0.10  Class 0 kU/L Final    Scallop 02/12/2025 <0.10  Class 0 kU/L Final    Wheat 02/12/2025 <0.10  Class 0 kU/L Final    [place] 02/12/2025 <0.10  Class 0 kU/L Final    Sesame Seed 02/12/2025 <0.10  Class 0 kU/L Final    Gliadin Deamidated Peptide Ab, IgA 02/12/2025 5  0 - 19 units Final                       Negative                   0 - 19                     Weak Positive             20 - 30                     Moderate to Strong Positive   >30    Tissue Transglutaminase IgA 02/12/2025 <2  0 - 3 U/mL Final                                  Negative        0 -  3                                Weak Positive   4 - 10                                Positive           >10   Tissue Transglutaminase (tTG) has been identified   as the endomysial antigen.  Studies have demonstr-   ated that endomysial IgA antibodies have over 99%   specificity for gluten sensitive enteropathy.    IgA 02/12/2025 198  87 - 352 mg/dL Final   Office Visit on  02/04/2025   Component Date Value Ref Range Status    Diagnosis 02/04/2025 Comment   Final    NEGATIVE FOR INTRAEPITHELIAL LESION OR MALIGNANCY.    Specimen adequacy: 02/04/2025 Comment   Final    Satisfactory for evaluation.  Endocervical and/or squamous metaplastic  cells (endocervical component) are present.    Clinician Provided ICD-10: 02/04/2025 Comment   Final    Z01.419    Performed by: 02/04/2025 Comment   Final    Bekah Crystal, Cytotechnologist (ASCP)    . 02/04/2025 .   Final    Note: 02/04/2025 Comment   Final    The Pap smear is a screening test designed to aid in the detection of  premalignant and malignant conditions of the uterine cervix.  It is not a  diagnostic procedure and should not be used as the sole means of detecting  cervical cancer.  Both false-positive and false-negative reports do occur.    Method: 02/04/2025 Comment   Final    This liquid based ThinPrep(R) pap test was screened with the  use of an image guided system.    Conv .conv 02/04/2025 Comment   Final    The HPV DNA reflex criteria were not met with this specimen result  therefore, no HPV testing was performed.    GARDNERELLA VAGINALIS 02/04/2025 Positive (A)  Negative Final    TRICHOMONAS VAGINALIS 02/04/2025 Negative  Negative Final    ABEL SPECIES 02/04/2025 Negative  Negative Final   Office Visit on 10/31/2024   Component Date Value Ref Range Status    TSH 10/31/2024 0.551  0.270 - 4.200 uIU/mL Final    WBC 10/31/2024 5.75  3.40 - 10.80 10*3/mm3 Final    RBC 10/31/2024 4.69  3.77 - 5.28 10*6/mm3 Final    Hemoglobin 10/31/2024 13.1  12.0 - 15.9 g/dL Final    Hematocrit 10/31/2024 40.4  34.0 - 46.6 % Final    MCV 10/31/2024 86.1  79.0 - 97.0 fL Final    MCH 10/31/2024 27.9  26.6 - 33.0 pg Final    MCHC 10/31/2024 32.4  31.5 - 35.7 g/dL Final    RDW 10/31/2024 14.1  12.3 - 15.4 % Final    RDW-SD 10/31/2024 44.4  37.0 - 54.0 fl Final    MPV 10/31/2024 13.2 (H)  6.0 - 12.0 fL Final    Platelets 10/31/2024 249  140 - 450  10*3/mm3 Final    Glucose 10/31/2024 82  65 - 99 mg/dL Final    BUN 10/31/2024 13  6 - 20 mg/dL Final    Creatinine 10/31/2024 0.97  0.57 - 1.00 mg/dL Final    Sodium 10/31/2024 140  136 - 145 mmol/L Final    Potassium 10/31/2024 3.9  3.5 - 5.2 mmol/L Final    Chloride 10/31/2024 108 (H)  98 - 107 mmol/L Final    CO2 10/31/2024 24.0  22.0 - 29.0 mmol/L Final    Calcium 10/31/2024 9.3  8.6 - 10.5 mg/dL Final    Total Protein 10/31/2024 7.4  6.0 - 8.5 g/dL Final    Albumin 10/31/2024 4.7  3.5 - 5.2 g/dL Final    ALT (SGPT) 10/31/2024 14  1 - 33 U/L Final    AST (SGOT) 10/31/2024 17  1 - 32 U/L Final    Alkaline Phosphatase 10/31/2024 73  39 - 117 U/L Final    Total Bilirubin 10/31/2024 0.8  0.0 - 1.2 mg/dL Final    Globulin 10/31/2024 2.7  gm/dL Final    A/G Ratio 10/31/2024 1.7  g/dL Final    BUN/Creatinine Ratio 10/31/2024 13.4  7.0 - 25.0 Final    Anion Gap 10/31/2024 8.0  5.0 - 15.0 mmol/L Final    eGFR 10/31/2024 83.3  >60.0 mL/min/1.73 Final    Total Cholesterol 10/31/2024 172  0 - 200 mg/dL Final    Triglycerides 10/31/2024 49  0 - 150 mg/dL Final    HDL Cholesterol 10/31/2024 46  40 - 60 mg/dL Final    LDL Cholesterol  10/31/2024 116 (H)  0 - 100 mg/dL Final    VLDL Cholesterol 10/31/2024 10  5 - 40 mg/dL Final    LDL/HDL Ratio 10/31/2024 2.53   Final   Lab on 09/23/2024   Component Date Value Ref Range Status    HCG Quantitative 09/23/2024 <1.00  mIU/mL Final     EKG Results:  No orders to display     Imaging Results:  No Images in the past 120 days found.    ASSESSMENT AND PLAN:    ICD-10-CM ICD-9-CM   1. GEOVANNI (generalized anxiety disorder)  F41.1 300.02   2. Major depressive disorder, recurrent episode, moderate  F33.1 296.32   3. 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 female who presents today for follow-up. We have discussed the interval history and the treatment plan below:      Medication regimen: discontinue bupropion and clonidine and begin desvenlafaxine 50  mg QD and rescue lorazepam 0.5 mg QD PRN anxiety (#15)   Monitoring: reviewed labs as populated above  Primary psychotherapy: referred  Follow-up: 6 weeks  Communications: N/A  Treatment plan: 01/16/2025    TREATMENT PLAN/GOALS: challenge patterns of living conducive to symptom burden, implement recommended regimen as above with augmentative, intermittent supportive psychotherapy to reduce symptom burden. Patient acknowledged and verbally consented to continue treatment.      Billing: This encounter is of moderate complexity based on number/complexity of problems addressed today and risk of complications/morbidity: 2+ stable chronic illnesses and and prescription management.     Electronically signed by Silvano Odom MD, 03/17/25, [id number]

## 2025-03-19 NOTE — PRE-PROCEDURE INSTRUCTIONS
Reminded of arrival time at 0800        , Entrance C of the [facility] hospital. Instructed to bring or have a  over the age of 18 set up to drive you home the day of procedure.    Instructed on clear liquid diet the day before, nothing red or purple. Call with any questions about the prep or if in need of the prep.  Reminded them not to eat or drink anything am of procedure unless its a sip of water with medications.  Patient verbalized understanding.

## 2025-03-24 ENCOUNTER — ANESTHESIA EVENT (OUTPATIENT)
Dept: GASTROENTEROLOGY | Facility: HOSPITAL | Age: 25
End: 2025-03-24
Payer: COMMERCIAL

## 2025-03-24 NOTE — ANESTHESIA PREPROCEDURE EVALUATION
Anesthesia Evaluation     Patient summary reviewed and Nursing notes reviewed   NPO Solid Status: > 8 hours  NPO Liquid Status: > 4 hours           Airway   Mallampati: I  TM distance: >3 FB  Neck ROM: full  No difficulty expected and Anterior  Dental - normal exam     Pulmonary     breath sounds clear to auscultation  Cardiovascular - normal exam  Exercise tolerance: good (4-7 METS)    ECG reviewed  Rhythm: regular  Rate: normal    (+) hypertension well controlled, valvular problems/murmurs      Neuro/Psych  (+) psychiatric history Anxiety and Depression  GI/Hepatic/Renal/Endo    (+) GI bleeding resolved    Musculoskeletal     Abdominal    Substance History      OB/GYN    (+) Pregnant, pregnancy induced hypertension        Other        ROS/Med Hx Other: Nausea, early satiety, lower abd pain     Echo 8/17/23:  Left ventricular systolic function is hyperdynamic (EF > 70%). Calculated left ventricular EF = 70.1%  Left ventricular diastolic function was normal.     EKG:  HEART RATE= 86  bpm  RR Interval= 696  ms  TN Interval= 151  ms  P Horizontal Axis= 9  deg  P Front Axis= 63  deg  QRSD Interval= 94  ms  QT Interval= 389  ms  QRS Axis= 152  deg  T Wave Axis= 67  deg  - OTHERWISE NORMAL ECG -  Sinus rhythm  Right axis deviation  Low voltage, precordial leads  When compared with ECG of 19-Feb-2023 12:05:23,  Significant axis, voltage or hypertrophy change  Electronically Signed By: Naseem Gaspar ([facility]) 22-Feb-2023 11:12:44  Date and Time of Study: 2023-02-19 14:25:18    Holter monitor 11/1/23:  ·  Patient was monitored for 6 days, 23 hours and 41 minutes.  ·  Lowest heart rate was 68 bpm, average heart rate was 113 bpm, and maximum heart rate was 158 bpm.  ·  No significant PACs or PVCs noted.  ·  32 patient activated events that corresponded to normal sinus rhythm and sinus tachycardia.  ·  No significant abnormal heart rhythms noted.     Hx POT's     Cards clearance per Dr. Grullon with acceptable risks on  02/12/25     HCG pending                     Anesthesia Plan    ASA 2     general   total IV anesthesia  (Patient understands anesthesia not responsible for dental damage. Risks explained including allergic reactions, BP, HR, O2 changes, aspiration, advanced airway placement. Pt verbalized understanding.)  intravenous induction     Anesthetic plan, risks, benefits, and alternatives have been provided, discussed and informed consent has been obtained with: patient and spouse/significant other.  Pre-procedure education provided  Plan discussed with CRNA.        CODE STATUS:

## 2025-03-25 ENCOUNTER — HOSPITAL ENCOUNTER (OUTPATIENT)
Facility: HOSPITAL | Age: 25
Setting detail: HOSPITAL OUTPATIENT SURGERY
Discharge: HOME | End: 2025-03-25
Attending: INTERNAL MEDICINE | Admitting: INTERNAL MEDICINE
Payer: COMMERCIAL

## 2025-03-25 ENCOUNTER — ANESTHESIA (OUTPATIENT)
Dept: GASTROENTEROLOGY | Facility: HOSPITAL | Age: 25
End: 2025-03-25
Payer: COMMERCIAL

## 2025-03-25 VITALS
DIASTOLIC BLOOD PRESSURE: 66 MMHG | RESPIRATION RATE: 20 BRPM | WEIGHT: 118.61 LBS | OXYGEN SATURATION: 100 % | TEMPERATURE: 97.3 F | SYSTOLIC BLOOD PRESSURE: 111 MMHG | BODY MASS INDEX: 19.16 KG/M2 | HEART RATE: 65 BPM

## 2025-03-25 DIAGNOSIS — R19.7 DIARRHEA, UNSPECIFIED TYPE: ICD-10-CM

## 2025-03-25 DIAGNOSIS — K62.5 RECTAL BLEEDING: ICD-10-CM

## 2025-03-25 DIAGNOSIS — R68.81 EARLY SATIETY: ICD-10-CM

## 2025-03-25 DIAGNOSIS — R10.30 LOWER ABDOMINAL PAIN: ICD-10-CM

## 2025-03-25 DIAGNOSIS — K59.00 CONSTIPATION, UNSPECIFIED CONSTIPATION TYPE: ICD-10-CM

## 2025-03-25 DIAGNOSIS — R11.0 NAUSEA: ICD-10-CM

## 2025-03-25 DIAGNOSIS — R12 HEARTBURN: ICD-10-CM

## 2025-03-25 LAB — B-HCG UR QL: NEGATIVE

## 2025-03-25 PROCEDURE — 25010000002 LIDOCAINE PF 2% 2 % SOLUTION: Performed by: NURSE ANESTHETIST, CERTIFIED REGISTERED

## 2025-03-25 PROCEDURE — 88305 TISSUE EXAM BY PATHOLOGIST: CPT | Performed by: INTERNAL MEDICINE

## 2025-03-25 PROCEDURE — 81025 URINE PREGNANCY TEST: CPT | Performed by: INTERNAL MEDICINE

## 2025-03-25 PROCEDURE — 45380 COLONOSCOPY AND BIOPSY: CPT | Performed by: INTERNAL MEDICINE

## 2025-03-25 PROCEDURE — 25810000003 LACTATED RINGERS PER 1000 ML: Performed by: NURSE ANESTHETIST, CERTIFIED REGISTERED

## 2025-03-25 PROCEDURE — 43239 EGD BIOPSY SINGLE/MULTIPLE: CPT | Performed by: INTERNAL MEDICINE

## 2025-03-25 PROCEDURE — 25010000002 PROPOFOL 10 MG/ML EMULSION: Performed by: NURSE ANESTHETIST, CERTIFIED REGISTERED

## 2025-03-25 RX ORDER — HYDROCORTISONE 25 MG/G
CREAM TOPICAL 2 TIMES DAILY
Qty: 28 G | Refills: 0 | Status: SHIPPED | OUTPATIENT
Start: 2025-03-25 | End: 2025-04-08

## 2025-03-25 RX ORDER — PROPOFOL 10 MG/ML
VIAL (ML) INTRAVENOUS AS NEEDED
Status: DISCONTINUED | OUTPATIENT
Start: 2025-03-25 | End: 2025-03-25 | Stop reason: SURG

## 2025-03-25 RX ORDER — SODIUM CHLORIDE, SODIUM LACTATE, POTASSIUM CHLORIDE, CALCIUM CHLORIDE 600; 310; 30; 20 MG/100ML; MG/100ML; MG/100ML; MG/100ML
30 INJECTION, SOLUTION INTRAVENOUS CONTINUOUS
Status: DISCONTINUED | OUTPATIENT
Start: 2025-03-25 | End: 2025-03-25 | Stop reason: HOSPADM

## 2025-03-25 RX ORDER — LIDOCAINE HYDROCHLORIDE 20 MG/ML
INJECTION, SOLUTION EPIDURAL; INFILTRATION; INTRACAUDAL; PERINEURAL AS NEEDED
Status: DISCONTINUED | OUTPATIENT
Start: 2025-03-25 | End: 2025-03-25 | Stop reason: SURG

## 2025-03-25 RX ADMIN — PROPOFOL 250 MCG/KG/MIN: 10 INJECTION, EMULSION INTRAVENOUS at 08:46

## 2025-03-25 RX ADMIN — PROPOFOL 80 MG: 10 INJECTION, EMULSION INTRAVENOUS at 08:46

## 2025-03-25 RX ADMIN — LIDOCAINE HYDROCHLORIDE 50 MG: 20 INJECTION, SOLUTION INTRAVENOUS at 08:46

## 2025-03-25 RX ADMIN — SODIUM CHLORIDE, POTASSIUM CHLORIDE, SODIUM LACTATE AND CALCIUM CHLORIDE: 600; 310; 30; 20 INJECTION, SOLUTION INTRAVENOUS at 08:44

## 2025-03-25 NOTE — ANESTHESIA POSTPROCEDURE EVALUATION
Patient: Ana Paula Amanda    Procedure Summary       Date: 03/25/25 Room / Location: [facility] ENDOSCOPY 3 / [facility] ENDOSCOPY    Anesthesia Start: 0844 Anesthesia Stop: 0916    Procedures:       ESOPHAGOGASTRODUODENOSCOPY with biopsies      COLONOSCOPY with biopsies Diagnosis:       Nausea      Heartburn      Early satiety      Lower abdominal pain      Rectal bleeding      Diarrhea, unspecified type      Constipation, unspecified constipation type      (Nausea [R11.0])      (Heartburn [R12])      (Early satiety [R68.81])      (Lower abdominal pain [R10.30])      (Rectal bleeding [K62.5])      (Diarrhea, unspecified type [R19.7])      (Constipation, unspecified constipation type [K59.00])    Surgeons: Isabela Humphries MD Provider: Nilda Terry CRNA    Anesthesia Type: general ASA Status: 2            Anesthesia Type: general    Vitals  Vitals Value Taken Time   /66 03/25/25 09:30   Temp 36.3 °C (97.3 °F) 03/25/25 09:30   Pulse 65 03/25/25 09:32   Resp 20 03/25/25 09:30   SpO2 100 % 03/25/25 09:32   Vitals shown include unfiled device data.        Post Anesthesia Care and Evaluation    Post-procedure mental status: acceptable.  Pain management: satisfactory to patient    Airway patency: patent  Anesthetic complications: No anesthetic complications    Cardiovascular status: acceptable  Respiratory status: acceptable    Comments: Per chart review

## 2025-03-25 NOTE — H&P
Pre Procedure History & Physical    Chief Complaint:   Nausea, heartburn, lower abd pain, diarrhea, constipation, rectal bleeding    Subjective     HPI:   23 yo F here for eval of nausea, heartburn, lower abd pain, diarrhea, constipation, rectal bleeding.    Past Medical History:   Past Medical History:   Diagnosis Date    Anxiety     Chronic hypertension affecting pregnancy 11/21/2023    Depression     Heart valve disease     Noted on previous echo    History of medical problems     POTS    Irritable bowel syndrome     Lactose intolerance     Orthostatic hypotension     Ovarian cyst     Found on one of my prenatal uktrasounds    POTS (postural orthostatic tachycardia syndrome)     Tachycardia     Urinary tract infection     Infrequently       Past Surgical History:  Past Surgical History:   Procedure Laterality Date    MANDIBLE SURGERY  2020    Reconstructive Jaw Surgery    WISDOM TOOTH EXTRACTION  2012       Family History:  Family History   Problem Relation Age of Onset    OCD Mother     Depression Mother     Anxiety disorder Mother     Heart disease Mother     Hyperlipidemia Mother     Thyroid disease Mother     Depression Father     Hyperlipidemia Father     Hypertension Father     No Known Problems Sister     Drug abuse Brother     ADD / ADHD Brother     No Known Problems Maternal Aunt     Clotting disorder Paternal Aunt     No Known Problems Maternal Uncle     No Known Problems Paternal Uncle     Heart disease Maternal Grandfather     Melanoma Maternal Grandfather     Heart disease Maternal Grandmother     Lung cancer Maternal Grandmother     Stroke Paternal Grandfather     Heart disease Paternal Grandmother     Hypertension Paternal Grandmother     Clotting disorder Paternal Grandmother     Heart attack Paternal Grandmother     No Known Problems Cousin     No Known Problems Other     Breast cancer Neg Hx     Ovarian cancer Neg Hx     Uterine cancer Neg Hx     Colon cancer Neg Hx     Deep vein thrombosis Neg  Hx     Pulmonary embolism Neg Hx     Alcohol abuse Neg Hx     Bipolar disorder Neg Hx     Dementia Neg Hx     Paranoid behavior Neg Hx     Schizophrenia Neg Hx     Seizures Neg Hx     Self-Injurious Behavior  Neg Hx     Suicide Attempts Neg Hx        Social History:   reports that she has never smoked. She has never been exposed to tobacco smoke. She has never used smokeless tobacco. She reports that she does not currently use alcohol. She reports that she does not use drugs.    Medications:   Medications Prior to Admission   Medication Sig Dispense Refill Last Dose/Taking    desvenlafaxine (PRISTIQ) 50 MG 24 hr tablet Take 1 tablet by mouth Daily. 30 tablet 1     Levonorgestrel (MIRENA) 20 MCG/DAY intrauterine device IUD To be inserted one time by prescriber. Route intrauterine.       LORazepam (ATIVAN) 0.5 MG tablet Take 1 tablet by mouth Daily As Needed for Anxiety. 15 tablet 1     midodrine (PROAMATINE) 5 MG tablet Take 1 tablet by mouth 3 (Three) Times a Day Before Meals. 90 tablet 6     pantoprazole (Protonix) 20 MG EC tablet Take 1 tablet by mouth Daily. 90 tablet 1     propranolol (INDERAL) 10 MG tablet Take 1 tablet by mouth 2 (Two) Times a Day. 60 tablet 6     Sodium Sulfate-Mag Sulfate-KCl (Sutab) 5148-551-766 MG tablet Take 12 tablets at 6 pm and 12 tablets 4 hours prior to procedure. 24 tablet 0     sodium-potassium-magnesium sulfates (Suprep Bowel Prep Kit) 17.5-3.13-1.6 GM/177ML solution oral solution Take 1 bottle by mouth Every 12 (Twelve) Hours. 354 mL 0        Allergies:  Lactose intolerance (gi)    ROS:    Pertinent items are noted in HPI     Objective     Weight 53.8 kg (118 lb 9.7 oz), not currently breastfeeding.    Physical Exam   Constitutional: Pt is oriented to person, place, and time and well-developed, well-nourished, and in no distress.   Mouth/Throat: Oropharynx is clear and moist.   Neck: Normal range of motion.   Cardiovascular: Normal rate, regular rhythm and normal heart sounds.     Pulmonary/Chest: Effort normal and breath sounds normal.   Abdominal: Soft. Nontender  Skin: Skin is warm and dry.   Psychiatric: Mood, memory, affect and judgment normal.     Assessment & Plan     Diagnosis:  Nausea, heartburn, lower abd pain, diarrhea, constipation, rectal bleeding    Anticipated Surgical Procedure:  EGD/colonoscopy    The risks, benefits, and alternatives of this procedure have been discussed with the patient or the responsible party- the patient understands and agrees to proceed.

## 2025-03-26 ENCOUNTER — RESULTS FOLLOW-UP (OUTPATIENT)
Dept: GASTROENTEROLOGY | Facility: HOSPITAL | Age: 25
End: 2025-03-26
Payer: COMMERCIAL

## 2025-03-26 LAB
CYTO UR: NORMAL
LAB AP CASE REPORT: NORMAL
LAB AP CLINICAL INFORMATION: NORMAL
PATH REPORT.FINAL DX SPEC: NORMAL
PATH REPORT.GROSS SPEC: NORMAL

## 2025-04-02 ENCOUNTER — PATIENT MESSAGE (OUTPATIENT)
Dept: GASTROENTEROLOGY | Facility: CLINIC | Age: 25
End: 2025-04-02
Payer: COMMERCIAL

## 2025-04-02 DIAGNOSIS — K58.0 IRRITABLE BOWEL SYNDROME WITH DIARRHEA: Primary | ICD-10-CM

## 2025-04-30 ENCOUNTER — TELEMEDICINE (OUTPATIENT)
Dept: PSYCHIATRY | Facility: CLINIC | Age: 25
End: 2025-04-30
Payer: COMMERCIAL

## 2025-04-30 ENCOUNTER — TELEPHONE (OUTPATIENT)
Dept: PSYCHIATRY | Facility: CLINIC | Age: 25
End: 2025-04-30

## 2025-04-30 DIAGNOSIS — F41.0 PANIC DISORDER: ICD-10-CM

## 2025-04-30 DIAGNOSIS — F33.1 MAJOR DEPRESSIVE DISORDER, RECURRENT EPISODE, MODERATE: Primary | ICD-10-CM

## 2025-04-30 DIAGNOSIS — F41.1 GAD (GENERALIZED ANXIETY DISORDER): ICD-10-CM

## 2025-04-30 RX ORDER — DEXTROMETHORPHAN HYDROBROMIDE, BUPROPION HYDROCHLORIDE 105; 45 MG/1; MG/1
TABLET, MULTILAYER, EXTENDED RELEASE ORAL
Qty: 60 TABLET | Refills: 1 | Status: SHIPPED | OUTPATIENT
Start: 2025-04-30

## 2025-04-30 RX ORDER — DESVENLAFAXINE 25 MG/1
TABLET, EXTENDED RELEASE ORAL
Qty: 14 TABLET | Refills: 0 | Status: SHIPPED | OUTPATIENT
Start: 2025-04-30 | End: 2025-05-05

## 2025-04-30 NOTE — PROGRESS NOTES
"[facility]  Follow-up Visit    Chief Complaint:  \"I've struggled with anxiety and depression for basically as long as I can remember... 2 years ago my brother  of an overdose... basically since then everything has gotten worse.\"    History of Present Illness: Ana Paula Amanda is a 24 y.o. female who presents today for follow-up. Last seen by this practice:  at which time desvenlafaxine and lorazepam replaced bupropion and clonidine. Services today rendered via telehealth (Synchronized) for which the patient provided informed consent. Patient is aware she can refuse to be seen remotely at any time. Patient was located in a secure, remote environment (in office at work) as was the provider (in-office). I confirmed the patient's identity prior to evaluation and reiterated my credentials; there were no technical issues during the session today.    Current treatment regimen includes:   - desvenlafaxine 50 mg QD  - lorazepam 0.5 mg QD PRN anxiety (#15)  - via another provider: propranolol 10 mg BID    Ana Paula presents on time and unaccompanied in no acute distress and engages with me appropriately. Today she reports she's feeling some better, but may be having issues tolerating her current regimen. Depressive and anxious symptoms are partially managed with current interventions. She is amenable to regimen changes today for optimization of effect and tolerability.  - sleep: generally fair - no change  - appetite: no change reported  - medication adverse effects: feels more tense and preoccupied with environmental cues to clean (associates this with Alevism of desvenlafaxine)    Interval History and Clinical Commentary:   Ego-syntonic. Ana Paula presents in a fashion consistent with prior assessments with regard to MSE.    Interval history reported to be generally unremarkable.    Axis I: MDD, GEOVANNI, panic disorder  Axis II: defer  Axis III: defer  Axis IV: housing stress, familial " stress  Axis V: 65    Differential considerations: N/A    Adherence:  Treatment adherence is appropriate; issues in this regard have included: N/A. Patient is advised not to misuse prescribed medications or to use them with any exogenous substances that aren't disclosed to this provider as they may interact with the regimen to the patient's detriment. The importance of adherence to the recommended treatment and interval follow-up appointments has been emphasized.     Education:  I have counseled Ana Paula with regard to diagnoses and the recommended treatment regimen as documented below: I will begin Auvelity for MDD. Patient advised this agent has propensity for contributing to dizziness, worsened anxiety/depression (worst-case scenario being eliciting SI - patient contracts for safety appropriately), headache, GI upset, and dry mouth. I have specifically reviewed issues related to misuse and diversion with the patient today. I have advised that because medications can elicit idiosyncratic reactions in different individuals that SE may present in ways that haven't been discussed. I have reiterated the importance of discussing with me any clinical changes that could represent potential adverse effects regarding the medication regimen.    Patient acknowledges the diagnoses per my rendered interpretation. Patient is agreeable to call 911 or go to the nearest ER should she become concerned for her own safety and/or the safety of those around her. Patient demonstrates understanding of potential risks/benefits/side effects associated with the recommended treatment regimen and is amenable to proceed in the fashion outlined below. Patient has been encouraged to cultivate constructive patterns of living including limiting daily caffeine intake, hydrating appropriately, regularly eating nutritious foods, engaging sleep hygiene practices, engaging in appropriate exposure to sunlight, engaging with hobbies in balance with life  necessities, and exercising appropriate to their capacity to do so.    Risk:  There is no significant change to risk profile discernible during today's evaluation - do note that this is subject to change with the [employer] of new stressors, treatment non-adherence, use of substances, and/or new medical ails. There is no appreciable evidence of intent for harm to self or others. There are no appreciable indices of jaye/psychosis.    Contraception and mitigation of potential teratogenicity: uses Mirena. I have advised there are risks taking any medication during pregnancy and, unfortunately, these risks are poorly elaborated due to ethical concerns with studying medications in pregnant women. I have advised that in the case of pregnancy risk may be beyond what I'm able to advise and the general approach is that medication use should only be considered if potential benefits outweigh the possibility of risks by the patient's measure.    Psychotherapy:  - Time: N/A  - interventions employed: the therapeutic alliance was strengthened to encourage the patient to express their thoughts and feelings freely. Esteem building was enhanced through praise, reassurance, normalizing/challenging, and encouragement as appropriate. Coping skills were enhanced to build distress tolerance skills and emotional regulation. Allowed patient to freely discuss issues without interruption or judgement with unconditional positive regard, active listening skills, and empathy. Provided a safe, confidential environment to facilitate the development of a positive therapeutic relationship and encourage open, honest communication. Assisted patient in processing session content; acknowledged and normalized/addressed, as appropriate, patient’s thoughts, feelings, and concerns by utilizing a person-centered approach in efforts to build appropriate rapport and a positive therapeutic relationship.   - Diagnoses: see assessment and plan below  - Symptoms:  "see subjective above  - Goals              - patient: improve mood, mitigate anxiety, bolster functional capacity              - provider: challenge patterns of living conducive to pathology, strengthen defenses, promote problems solving, restore adaptive functioning and provide symptom relief.  - Treatment plan: continue supportive psychotherapy in subsequent appointments to provide symptom relief; see assessment and plan below for additional details:              - iteration: 1              - progress: partial              - (X)illumination, (X)contextualization, (working)detection, (working)development, (-)elaboration, (-)refinement  - functional status: fair  - mental status exam: as below  - prognosis: good    Psychiatric History:  Diagnoses: depression and anxiety  Outpatient history: denies  Inpatient history: denies  Medication trials: hydroxyzine, trazodone, lorazepam (was helpful), sertraline (GI distress), fluoxetine, escitalopram (headaches), bupropion XL (appetite suppression), clonidine (dizziness)  Other treatment modalities: has done some therapy in the past  Presenting regimen: N/A  Self harm: denies  Suicide attempts: denies     Substance Abuse History:   Types/methods/frequency: marijuana in high school, not since     Social History:  Residence: lives in an apartment with her  (2023), 3 YO daughter, and son (Eduardo, born 2024)  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  Education: RN + NP  Pertinent developmental history: denies; denies abuse hx  Pertinent legal history: denies  Hobbies/interests: chayo, reading  Latter-day: \"in a gray space\"; raised Christianity (non-Temple)  Exercise: denies  Dietary habits: defer  Sleep hygiene: defer  Social habits: no pertinent issues  Sunlight: no concern for under-exposure  Caffeine intake: no pertinent issues; 1 cup of coffee in the morning, no tea, occasional soda, rare energy drink  Hydration habits: no pertinent issues   " history: denies    Social History     Socioeconomic History    Marital status:    Tobacco Use    Smoking status: Never     Passive exposure: Never    Smokeless tobacco: Never   Vaping Use    Vaping status: Never Used   Substance and Sexual Activity    Alcohol use: Not Currently     Comment: socially    Drug use: Never    Sexual activity: Yes     Partners: Male     Birth control/protection: I.U.D.     Comment: CINDI 9-2024     Tobacco use counseling/intervention: N/A, patient does not use tobacco; patient has been counseled with regard to risks of tobacco use.    PHQ-9 Depression Screening  PHQ-9 Total Score:       Little interest or pleasure in doing things?     Feeling down, depressed, or hopeless?     PHQ-2 Total Score     Trouble falling or staying asleep, or sleeping too much?     Feeling tired or having little energy?     Poor appetite or overeating?     Feeling bad about yourself - or that you are a failure or have let yourself or your family down?     Trouble concentrating on things, such as reading the newspaper or watching television?     Moving or speaking so slowly that other people could have noticed? Or the opposite - being so fidgety or restless that you have been moving around a lot more than usual?     Thoughts that you would be better off dead, or of hurting yourself in some way?     PHQ-9 Total Score     If you checked off any problems, how difficult have these problems made it for you to do your work, take care of things at home, or get along with other people?         Change in PHQ-9 since last measure: N/A (13)    GEOVANNI-7       Change in GEOVANNI-7 since last measure: N/A (19)    Problem List:  Patient Active Problem List   Diagnosis    Irritable bowel syndrome with both constipation and diarrhea    GEOVANNI (generalized anxiety disorder)    Major depressive disorder, recurrent episode, moderate    Panic disorder    Nausea    Heartburn    Early satiety    Lower abdominal pain    Rectal bleeding     Diarrhea    Constipation     Allergy:   Allergies   Allergen Reactions    Lactose Intolerance (Gi) GI Intolerance      Discontinued Medications:  Medications Discontinued During This Encounter   Medication Reason    desvenlafaxine (PRISTIQ) 50 MG 24 hr tablet      Current Medications:   Current Outpatient Medications   Medication Sig Dispense Refill    desvenlafaxine 25 MG tablet sustained-release 24 hour Take one tablet by mouth daily for 1 week, then take one tablet by mouth every other day for 1 week before discontinuing this medication. 14 tablet 0    Dextromethorphan-buPROPion ER (Auvelity)  MG tablet controlled-release Take one tablet by mouth daily for the first three days, then take one tablet by mouth twice daily 60 tablet 1    Levonorgestrel (MIRENA) 20 MCG/DAY intrauterine device IUD To be inserted one time by prescriber. Route intrauterine.      LORazepam (ATIVAN) 0.5 MG tablet Take 1 tablet by mouth Daily As Needed for Anxiety. 15 tablet 1    midodrine (PROAMATINE) 5 MG tablet Take 1 tablet by mouth 3 (Three) Times a Day Before Meals. 90 tablet 6    pantoprazole (Protonix) 20 MG EC tablet Take 1 tablet by mouth Daily. 90 tablet 1    propranolol (INDERAL) 10 MG tablet Take 1 tablet by mouth 2 (Two) Times a Day. 60 tablet 6    riFAXIMin (Xifaxan) 550 MG tablet Take 1 tablet by mouth Every 8 (Eight) Hours. 42 tablet 2     No current facility-administered medications for this visit.     Past Medical History:  Past Medical History:   Diagnosis Date    Anxiety     Chronic hypertension affecting pregnancy 11/21/2023    Depression     Heart valve disease     Noted on previous echo    History of medical problems     POTS    Irritable bowel syndrome     Lactose intolerance     Orthostatic hypotension     Ovarian cyst     Found on one of my prenatal uktrasounds    POTS (postural orthostatic tachycardia syndrome)     Tachycardia     Urinary tract infection     Infrequently     Past Surgical History:  Past  Surgical History:   Procedure Laterality Date    COLONOSCOPY N/A 3/25/2025    Procedure: COLONOSCOPY with biopsies;  Surgeon: Isabela Humphries MD;  Location: [facility] ENDOSCOPY;  Service: Gastroenterology;  Laterality: N/A;  hemorrhoids    ENDOSCOPY N/A 3/25/2025    Procedure: ESOPHAGOGASTRODUODENOSCOPY with biopsies;  Surgeon: Isabela Humphries MD;  Location: [facility] ENDOSCOPY;  Service: Gastroenterology;  Laterality: N/A;  normal    MANDIBLE SURGERY  2020    Reconstructive Jaw Surgery    WISDOM TOOTH EXTRACTION  2012     Mental Status Exam:   Appearance: well-groomed, sits upright, age-appropriate, normal habitus  Behavior: calm, cooperative, appropriate in demeanor, appropriate eye-contact  Mood/affect: euthymic / mood-congruent, but appropriate in both range and amplitude  Speech: within expected variance; appropriate rate, appropriate rhythm, appropriate tone; non-pressured  Thought Process: linear, goal-directed; no FOI or JOAN; abstraction intact  Thought Content: coherent, devoid of overt delusions/perceptual disturbances  SI/HI: denies both SI and HI; exhibits future-orientation, self-advocates appropriately, no regular self-harm, no appreciable intent  Memory: no overt deficits  Orientation: oriented to person/place/time/situation  Concentration: appropriate during interview  Intellectual capacity: presumptively average  Insight: fair by given history/exam  Judgment: appropriate by given history/exam  Psychomotor: no appreciable latency/retardation/agitation/tremor  Gait: deferred    Review of Systems:  Review of Systems    Vital Signs:   There were no vitals taken for this visit.     Lab Results:   Admission on 03/25/2025, Discharged on 03/25/2025   Component Date Value Ref Range Status    HCG, Urine QL 03/25/2025 Negative  Negative Final    Case Report 03/25/2025    Final                    Value:Surgical Pathology Report                         Case: [id number]                                 "  Authorizing Provider:  Isabela Humphries MD Collected:           03/25/2025 08:55 AM          Ordering Location:     [facility] Received:            03/25/2025 10:07 AM                                 SUITES                                                                       Pathologist:           Daniel Schumacher MD                                                            Specimens:   1) - Large Intestine, Left / Descending Colon, Random colon biopsies                                2) - Small Intestine, Duodenum, duodenum biopsies                                                   3) - Gastric, Antrum, antrum biopsies                                                      Clinical Information 03/25/2025    Final                    Value:Nausea  Heartburn  Early satiety  Lower abdominal pain  Rectal bleeding  Diarrhea, unspecified type  Constipation, unspecified constipation type      Final Diagnosis 03/25/2025    Final                    Value:1. Random colon, biopsy:   - No significant pathologic change   - Negative for microscopic colitis      2. Duodenum, biopsy:   - No significant pathologic change   - Preserved villous architecture and no increase in intraepithelial lymphocytes      3. Stomach, antrum, biopsy:   - Gastric body/fundus mucosa with no significant pathologic change   - Negative for Helicobacter pylori on routine H&E stain   - Negative for intestinal metaplasia, dysplasia and malignancy      Gross Description 03/25/2025    Final                    Value:1. Large Intestine, Left / Descending Colon.  Received in formalin and labeled \" random colon\" is a 0.7 cm aggregate of tan soft tissue fragments. The specimen is entirely submitted in one cassette.    2. Small Intestine, Duodenum.  Received in formalin and labeled \" duodenum\" is a 0.4 cm fragment of tan soft tissue. The specimen is entirely submitted in one cassette.    3. Gastric, Antrum.  Received in formalin and labeled " "\"antrum\" are 2 fragments of tan soft tissue measuring 0.3-0.6 cm in greatest dimension.  The specimen is submitted in toto in 1 cassette.   FRANCIS      Microscopic Description 03/25/2025    Final                    Value:Microscopic examination performed.     Lab on 02/12/2025   Component Date Value Ref Range Status    Class Description 02/12/2025 Comment   Final        Levels of Specific IgE       Class  Description of Class      ---------------------------  -----  --------------------                     < 0.10         0         Negative             0.10 -    0.31         0/I       Equivocal/Low             0.32 -    0.55         I         Low             0.56 -    1.40         II        Moderate             1.41 -    3.90         III       High             3.91 -   19.00         IV        Very High            19.01 -  100.00         V         Very High                    >100.00         VI        Very High    IgE 02/12/2025 44  6 - 495 IU/mL Final    Pork 02/12/2025 <0.10  Class 0 kU/L Final    Beef 02/12/2025 <0.10  Class 0 kU/L Final    Lamb 02/12/2025 <0.10  Class 0 kU/L Final    [id number] Alpha-Gal 02/12/2025 <0.10  Class 0 kU/L Final    Class Description 02/12/2025 Comment   Final        Levels of Specific IgE       Class  Description of Class      ---------------------------  -----  --------------------                     < 0.10         0         Negative             0.10 -    0.31         0/I       Equivocal/Low             0.32 -    0.55         I         Low             0.56 -    1.40         II        Moderate             1.41 -    3.90         III       High             3.91 -   19.00         IV        Very High            19.01 -  100.00         V         Very High                    >100.00         VI        Very High    Egg White 02/12/2025 <0.10  Class 0 kU/L Final    Peanut 02/12/2025 <0.10  Class 0 kU/L Final    Soybean 02/12/2025 <0.10  Class 0 kU/L Final    Milk, Cow's 02/12/2025 <0.10  Class 0 " kU/L Final    Clams 02/12/2025 <0.10  Class 0 kU/L Final    Shrimp 02/12/2025 <0.10  Class 0 kU/L Final    [place] 02/12/2025 <0.10  Class 0 kU/L Final    CodFish 02/12/2025 <0.10  Class 0 kU/L Final    Scallop 02/12/2025 <0.10  Class 0 kU/L Final    Wheat 02/12/2025 <0.10  Class 0 kU/L Final    [place] 02/12/2025 <0.10  Class 0 kU/L Final    Sesame Seed 02/12/2025 <0.10  Class 0 kU/L Final    Gliadin Deamidated Peptide Ab, IgA 02/12/2025 5  0 - 19 units Final                       Negative                   0 - 19                     Weak Positive             20 - 30                     Moderate to Strong Positive   >30    Tissue Transglutaminase IgA 02/12/2025 <2  0 - 3 U/mL Final                                  Negative        0 -  3                                Weak Positive   4 - 10                                Positive           >10   Tissue Transglutaminase (tTG) has been identified   as the endomysial antigen.  Studies have demonstr-   ated that endomysial IgA antibodies have over 99%   specificity for gluten sensitive enteropathy.    IgA 02/12/2025 198  87 - 352 mg/dL Final   Office Visit on 02/04/2025   Component Date Value Ref Range Status    Diagnosis 02/04/2025 Comment   Final    NEGATIVE FOR INTRAEPITHELIAL LESION OR MALIGNANCY.    Specimen adequacy: 02/04/2025 Comment   Final    Satisfactory for evaluation.  Endocervical and/or squamous metaplastic  cells (endocervical component) are present.    Clinician Provided ICD-10: 02/04/2025 Comment   Final    Z01.419    Performed by: 02/04/2025 Comment   Final    Bekah Crystal, Cytotechnologist (ASCP)    . 02/04/2025 .   Final    Note: 02/04/2025 Comment   Final    The Pap smear is a screening test designed to aid in the detection of  premalignant and malignant conditions of the uterine cervix.  It is not a  diagnostic procedure and should not be used as the sole means of detecting  cervical cancer.  Both false-positive and false-negative reports do occur.     Method: 02/04/2025 Comment   Final    This liquid based ThinPrep(R) pap test was screened with the  use of an image guided system.    Conv .conv 02/04/2025 Comment   Final    The HPV DNA reflex criteria were not met with this specimen result  therefore, no HPV testing was performed.    GARDNERELLA VAGINALIS 02/04/2025 Positive (A)  Negative Final    TRICHOMONAS VAGINALIS 02/04/2025 Negative  Negative Final    ABEL SPECIES 02/04/2025 Negative  Negative Final   Office Visit on 10/31/2024   Component Date Value Ref Range Status    TSH 10/31/2024 0.551  0.270 - 4.200 uIU/mL Final    WBC 10/31/2024 5.75  3.40 - 10.80 10*3/mm3 Final    RBC 10/31/2024 4.69  3.77 - 5.28 10*6/mm3 Final    Hemoglobin 10/31/2024 13.1  12.0 - 15.9 g/dL Final    Hematocrit 10/31/2024 40.4  34.0 - 46.6 % Final    MCV 10/31/2024 86.1  79.0 - 97.0 fL Final    MCH 10/31/2024 27.9  26.6 - 33.0 pg Final    MCHC 10/31/2024 32.4  31.5 - 35.7 g/dL Final    RDW 10/31/2024 14.1  12.3 - 15.4 % Final    RDW-SD 10/31/2024 44.4  37.0 - 54.0 fl Final    MPV 10/31/2024 13.2 (H)  6.0 - 12.0 fL Final    Platelets 10/31/2024 249  140 - 450 10*3/mm3 Final    Glucose 10/31/2024 82  65 - 99 mg/dL Final    BUN 10/31/2024 13  6 - 20 mg/dL Final    Creatinine 10/31/2024 0.97  0.57 - 1.00 mg/dL Final    Sodium 10/31/2024 140  136 - 145 mmol/L Final    Potassium 10/31/2024 3.9  3.5 - 5.2 mmol/L Final    Chloride 10/31/2024 108 (H)  98 - 107 mmol/L Final    CO2 10/31/2024 24.0  22.0 - 29.0 mmol/L Final    Calcium 10/31/2024 9.3  8.6 - 10.5 mg/dL Final    Total Protein 10/31/2024 7.4  6.0 - 8.5 g/dL Final    Albumin 10/31/2024 4.7  3.5 - 5.2 g/dL Final    ALT (SGPT) 10/31/2024 14  1 - 33 U/L Final    AST (SGOT) 10/31/2024 17  1 - 32 U/L Final    Alkaline Phosphatase 10/31/2024 73  39 - 117 U/L Final    Total Bilirubin 10/31/2024 0.8  0.0 - 1.2 mg/dL Final    Globulin 10/31/2024 2.7  gm/dL Final    A/G Ratio 10/31/2024 1.7  g/dL Final    BUN/Creatinine Ratio 10/31/2024  13.4  7.0 - 25.0 Final    Anion Gap 10/31/2024 8.0  5.0 - 15.0 mmol/L Final    eGFR 10/31/2024 83.3  >60.0 mL/min/1.73 Final    Total Cholesterol 10/31/2024 172  0 - 200 mg/dL Final    Triglycerides 10/31/2024 49  0 - 150 mg/dL Final    HDL Cholesterol 10/31/2024 46  40 - 60 mg/dL Final    LDL Cholesterol  10/31/2024 116 (H)  0 - 100 mg/dL Final    VLDL Cholesterol 10/31/2024 10  5 - 40 mg/dL Final    LDL/HDL Ratio 10/31/2024 2.53   Final     EKG Results:  No orders to display     Imaging Results:  No Images in the past 120 days found.    ASSESSMENT AND PLAN:    ICD-10-CM ICD-9-CM   1. Major depressive disorder, recurrent episode, moderate  F33.1 296.32   2. GEOVANNI (generalized anxiety disorder)  F41.1 300.02   3. Panic disorder  F41.0 300.01     24 y.o. female who presents today for follow-up. We have discussed the interval history and the treatment plan below:      Medication regimen: taper desvenlafaxine to discontinuation over the next two weeks, begin Auvelity  mg BID - continue rescue lorazepam   Monitoring: reviewed labs as populated above  Primary psychotherapy: referred  Follow-up: 6 weeks  Communications: N/A  Treatment plan: due; defer    TREATMENT PLAN/GOALS: challenge patterns of living conducive to symptom burden, implement recommended regimen as above with augmentative, intermittent supportive psychotherapy to reduce symptom burden. Patient acknowledged and verbally consented to continue treatment.      Billing: This encounter is of moderate complexity based on number/complexity of problems addressed today and risk of complications/morbidity: 2+ stable chronic illnesses and and prescription management.     Electronically signed by Silvano Odom MD, 04/30/25, [id number]

## 2025-05-01 ENCOUNTER — TELEPHONE (OUTPATIENT)
Dept: PSYCHIATRY | Facility: CLINIC | Age: 25
End: 2025-05-01
Payer: COMMERCIAL

## 2025-05-01 DIAGNOSIS — F41.0 PANIC DISORDER: ICD-10-CM

## 2025-05-01 DIAGNOSIS — F41.1 GAD (GENERALIZED ANXIETY DISORDER): Primary | ICD-10-CM

## 2025-05-01 DIAGNOSIS — F33.1 MAJOR DEPRESSIVE DISORDER, RECURRENT EPISODE, MODERATE: ICD-10-CM

## 2025-05-02 NOTE — TELEPHONE ENCOUNTER
[place] DENIAL RECEIVED FROM INSURANCE FOR PTS AUVELITY.    DENIAL REASONING IS ATTACHED.     BEHAVIORAL HEALTH - SCAN - [place] DENIAL AUVELITY; [facility]; 05/01/2025. (05/02/2025)

## 2025-05-02 NOTE — TELEPHONE ENCOUNTER
I loathe insurance companies. Basically, just run the options by her and see what might sound appealing as an alternative, please.

## 2025-05-07 ENCOUNTER — TELEPHONE (OUTPATIENT)
Dept: PSYCHIATRY | Facility: CLINIC | Age: 25
End: 2025-05-07
Payer: COMMERCIAL

## 2025-05-07 NOTE — TELEPHONE ENCOUNTER
[place] APPROVAL RECEIVED FROM INSURANCE FOR PTS TRINTELLIX 10 MG TABLETS.    BEHAVIORAL HEALTH - SCAN - [place] APPROVAL TRINTELLIX 10; [facility]; 05/07/2025. (05/07/2025)     PT NOTIFIED VIA TELEPHONE.

## 2025-06-12 ENCOUNTER — TELEMEDICINE (OUTPATIENT)
Dept: PSYCHIATRY | Facility: CLINIC | Age: 25
End: 2025-06-12
Payer: COMMERCIAL

## 2025-06-12 DIAGNOSIS — Z03.89 NO DIAGNOSIS ON AXIS I: Primary | ICD-10-CM

## 2025-07-07 ENCOUNTER — TELEPHONE (OUTPATIENT)
Dept: GASTROENTEROLOGY | Facility: CLINIC | Age: 25
End: 2025-07-07
Payer: COMMERCIAL

## 2025-07-07 NOTE — TELEPHONE ENCOUNTER
Hub staff attempted to follow warm transfer process and was unsuccessful     Caller: [facility]    Relationship to patient:     Best call back number: [phone]     Patient is needing: GRANT FROM [facility] IS CALLING TO PROVIDE COVERMY MEDS KEY FOR XIFAXAN PA. THAT KEY IS [id number]. CRITERIA FOR THIS IS APPROVAL REQUIRES THAT PT TRIES AT LEAST 2 CONVENTIONAL THERAPIES SUCH AS ANTI-SPASMATIC, ANTI-DIRRHEAL OR ANTI - TRICYCLIC ANTIDEPRESSANT. PLEASE CALL [facility] WITH ANY QUESTIONS

## 2025-08-04 ENCOUNTER — TELEPHONE (OUTPATIENT)
Dept: OBSTETRICS AND GYNECOLOGY | Age: 25
End: 2025-08-04
Payer: COMMERCIAL

## (undated) DEVICE — BLCK/BITE BLOX WO/DENTL/RIM W/STRAP 54F

## (undated) DEVICE — LINER SURG CANSTR SXN S/RIGD 1500CC

## (undated) DEVICE — THE STERILE LIGHT HANDLE COVER IS USED WITH STERIS SURGICAL LIGHTING AND VISUALIZATION SYSTEMS.

## (undated) DEVICE — SINGLE-USE BIOPSY FORCEPS: Brand: RADIAL JAW 4

## (undated) DEVICE — DEFENDO AIR WATER SUCTION AND BIOPSY VALVE KIT FOR  OLYMPUS: Brand: DEFENDO AIR/WATER/SUCTION AND BIOPSY VALVE

## (undated) DEVICE — SOL IRRG H2O PL/BG 1000ML STRL

## (undated) DEVICE — CONN JET HYDRA H20 AUXILIARY DISP

## (undated) DEVICE — SOLIDIFIER LIQLOC PLS 1500CC BT

## (undated) DEVICE — Device